# Patient Record
Sex: MALE | Race: OTHER | NOT HISPANIC OR LATINO | URBAN - METROPOLITAN AREA
[De-identification: names, ages, dates, MRNs, and addresses within clinical notes are randomized per-mention and may not be internally consistent; named-entity substitution may affect disease eponyms.]

---

## 2017-11-15 ENCOUNTER — EMERGENCY (EMERGENCY)
Facility: HOSPITAL | Age: 72
LOS: 1 days | Discharge: HOME | End: 2017-11-15
Attending: EMERGENCY MEDICINE | Admitting: INTERNAL MEDICINE
Payer: COMMERCIAL

## 2017-11-15 VITALS
SYSTOLIC BLOOD PRESSURE: 131 MMHG | OXYGEN SATURATION: 96 % | HEART RATE: 65 BPM | DIASTOLIC BLOOD PRESSURE: 68 MMHG | RESPIRATION RATE: 18 BRPM | TEMPERATURE: 98 F

## 2017-11-15 VITALS
TEMPERATURE: 99 F | RESPIRATION RATE: 18 BRPM | HEART RATE: 72 BPM | SYSTOLIC BLOOD PRESSURE: 168 MMHG | WEIGHT: 146.61 LBS | OXYGEN SATURATION: 98 % | DIASTOLIC BLOOD PRESSURE: 94 MMHG

## 2017-11-15 VITALS
SYSTOLIC BLOOD PRESSURE: 104 MMHG | RESPIRATION RATE: 18 BRPM | TEMPERATURE: 97 F | OXYGEN SATURATION: 99 % | WEIGHT: 149.91 LBS | HEART RATE: 67 BPM | HEIGHT: 61.81 IN | DIASTOLIC BLOOD PRESSURE: 66 MMHG

## 2017-11-15 DIAGNOSIS — E78.00 PURE HYPERCHOLESTEROLEMIA, UNSPECIFIED: ICD-10-CM

## 2017-11-15 DIAGNOSIS — R07.9 CHEST PAIN, UNSPECIFIED: ICD-10-CM

## 2017-11-15 DIAGNOSIS — I10 ESSENTIAL (PRIMARY) HYPERTENSION: ICD-10-CM

## 2017-11-15 DIAGNOSIS — R06.02 SHORTNESS OF BREATH: ICD-10-CM

## 2017-11-15 DIAGNOSIS — E11.65 TYPE 2 DIABETES MELLITUS WITH HYPERGLYCEMIA: ICD-10-CM

## 2017-11-15 LAB
ALBUMIN SERPL ELPH-MCNC: 4 G/DL — SIGNIFICANT CHANGE UP (ref 3.3–5)
ALBUMIN SERPL ELPH-MCNC: 4 G/DL — SIGNIFICANT CHANGE UP (ref 3.3–5)
ALP SERPL-CCNC: 65 U/L — SIGNIFICANT CHANGE UP (ref 40–120)
ALP SERPL-CCNC: 77 U/L — SIGNIFICANT CHANGE UP (ref 40–120)
ALT FLD-CCNC: 16 U/L — SIGNIFICANT CHANGE UP (ref 10–45)
ALT FLD-CCNC: 16 U/L — SIGNIFICANT CHANGE UP (ref 10–45)
ANION GAP SERPL CALC-SCNC: 12 MMOL/L — SIGNIFICANT CHANGE UP (ref 5–17)
ANION GAP SERPL CALC-SCNC: 13 MMOL/L — SIGNIFICANT CHANGE UP (ref 5–17)
APTT BLD: 24.9 SEC — LOW (ref 27.5–37.4)
AST SERPL-CCNC: 11 U/L — SIGNIFICANT CHANGE UP (ref 10–40)
AST SERPL-CCNC: 11 U/L — SIGNIFICANT CHANGE UP (ref 10–40)
BASOPHILS NFR BLD AUTO: 0.2 % — SIGNIFICANT CHANGE UP (ref 0–2)
BASOPHILS NFR BLD AUTO: 0.3 % — SIGNIFICANT CHANGE UP (ref 0–2)
BILIRUB SERPL-MCNC: 0.4 MG/DL — SIGNIFICANT CHANGE UP (ref 0.2–1.2)
BILIRUB SERPL-MCNC: 0.5 MG/DL — SIGNIFICANT CHANGE UP (ref 0.2–1.2)
BUN SERPL-MCNC: 20 MG/DL — SIGNIFICANT CHANGE UP (ref 7–23)
BUN SERPL-MCNC: 22 MG/DL — SIGNIFICANT CHANGE UP (ref 7–23)
CALCIUM SERPL-MCNC: 9.4 MG/DL — SIGNIFICANT CHANGE UP (ref 8.4–10.5)
CALCIUM SERPL-MCNC: 9.7 MG/DL — SIGNIFICANT CHANGE UP (ref 8.4–10.5)
CHLORIDE SERPL-SCNC: 96 MMOL/L — SIGNIFICANT CHANGE UP (ref 96–108)
CHLORIDE SERPL-SCNC: 99 MMOL/L — SIGNIFICANT CHANGE UP (ref 96–108)
CK MB CFR SERPL CALC: 3.3 NG/ML — SIGNIFICANT CHANGE UP (ref 0–6.7)
CK SERPL-CCNC: 86 U/L — SIGNIFICANT CHANGE UP (ref 30–200)
CO2 SERPL-SCNC: 25 MMOL/L — SIGNIFICANT CHANGE UP (ref 22–31)
CO2 SERPL-SCNC: 26 MMOL/L — SIGNIFICANT CHANGE UP (ref 22–31)
CREAT SERPL-MCNC: 0.76 MG/DL — SIGNIFICANT CHANGE UP (ref 0.5–1.3)
CREAT SERPL-MCNC: 0.85 MG/DL — SIGNIFICANT CHANGE UP (ref 0.5–1.3)
EOSINOPHIL NFR BLD AUTO: 1.3 % — SIGNIFICANT CHANGE UP (ref 0–6)
EOSINOPHIL NFR BLD AUTO: 1.9 % — SIGNIFICANT CHANGE UP (ref 0–6)
GLUCOSE SERPL-MCNC: 318 MG/DL — HIGH (ref 70–99)
GLUCOSE SERPL-MCNC: 372 MG/DL — HIGH (ref 70–99)
HCT VFR BLD CALC: 41.6 % — SIGNIFICANT CHANGE UP (ref 39–50)
HCT VFR BLD CALC: 41.9 % — SIGNIFICANT CHANGE UP (ref 39–50)
HGB BLD-MCNC: 14.3 G/DL — SIGNIFICANT CHANGE UP (ref 13–17)
HGB BLD-MCNC: 14.4 G/DL — SIGNIFICANT CHANGE UP (ref 13–17)
INR BLD: 0.99 — SIGNIFICANT CHANGE UP (ref 0.88–1.16)
LYMPHOCYTES # BLD AUTO: 14.5 % — SIGNIFICANT CHANGE UP (ref 13–44)
LYMPHOCYTES # BLD AUTO: 22 % — SIGNIFICANT CHANGE UP (ref 13–44)
MAGNESIUM SERPL-MCNC: 2.2 MG/DL — SIGNIFICANT CHANGE UP (ref 1.6–2.6)
MCHC RBC-ENTMCNC: 29.5 PG — SIGNIFICANT CHANGE UP (ref 27–34)
MCHC RBC-ENTMCNC: 29.8 PG — SIGNIFICANT CHANGE UP (ref 27–34)
MCHC RBC-ENTMCNC: 34.4 G/DL — SIGNIFICANT CHANGE UP (ref 32–36)
MCHC RBC-ENTMCNC: 34.4 G/DL — SIGNIFICANT CHANGE UP (ref 32–36)
MCV RBC AUTO: 86 FL — SIGNIFICANT CHANGE UP (ref 80–100)
MCV RBC AUTO: 86.6 FL — SIGNIFICANT CHANGE UP (ref 80–100)
MONOCYTES NFR BLD AUTO: 6.7 % — SIGNIFICANT CHANGE UP (ref 2–14)
MONOCYTES NFR BLD AUTO: 6.8 % — SIGNIFICANT CHANGE UP (ref 2–14)
NEUTROPHILS NFR BLD AUTO: 69 % — SIGNIFICANT CHANGE UP (ref 43–77)
NEUTROPHILS NFR BLD AUTO: 77.3 % — HIGH (ref 43–77)
PLATELET # BLD AUTO: 108 K/UL — LOW (ref 150–400)
PLATELET # BLD AUTO: 98 K/UL — LOW (ref 150–400)
POTASSIUM SERPL-MCNC: 4.1 MMOL/L — SIGNIFICANT CHANGE UP (ref 3.5–5.3)
POTASSIUM SERPL-MCNC: 4.2 MMOL/L — SIGNIFICANT CHANGE UP (ref 3.5–5.3)
POTASSIUM SERPL-SCNC: 4.1 MMOL/L — SIGNIFICANT CHANGE UP (ref 3.5–5.3)
POTASSIUM SERPL-SCNC: 4.2 MMOL/L — SIGNIFICANT CHANGE UP (ref 3.5–5.3)
PROT SERPL-MCNC: 7.1 G/DL — SIGNIFICANT CHANGE UP (ref 6–8.3)
PROT SERPL-MCNC: 7.1 G/DL — SIGNIFICANT CHANGE UP (ref 6–8.3)
PROTHROM AB SERPL-ACNC: 11 SEC — SIGNIFICANT CHANGE UP (ref 9.8–12.7)
RBC # BLD: 4.84 M/UL — SIGNIFICANT CHANGE UP (ref 4.2–5.8)
RBC # BLD: 4.84 M/UL — SIGNIFICANT CHANGE UP (ref 4.2–5.8)
RBC # FLD: 12.1 % — SIGNIFICANT CHANGE UP (ref 10.3–16.9)
RBC # FLD: 12.3 % — SIGNIFICANT CHANGE UP (ref 10.3–16.9)
SODIUM SERPL-SCNC: 134 MMOL/L — LOW (ref 135–145)
SODIUM SERPL-SCNC: 137 MMOL/L — SIGNIFICANT CHANGE UP (ref 135–145)
TROPONIN T SERPL-MCNC: <0.01 NG/ML — SIGNIFICANT CHANGE UP (ref 0–0.01)
WBC # BLD: 5.2 K/UL — SIGNIFICANT CHANGE UP (ref 3.8–10.5)
WBC # BLD: 6.9 K/UL — SIGNIFICANT CHANGE UP (ref 3.8–10.5)
WBC # FLD AUTO: 5.2 K/UL — SIGNIFICANT CHANGE UP (ref 3.8–10.5)
WBC # FLD AUTO: 6.9 K/UL — SIGNIFICANT CHANGE UP (ref 3.8–10.5)

## 2017-11-15 PROCEDURE — 70450 CT HEAD/BRAIN W/O DYE: CPT | Mod: 26

## 2017-11-15 PROCEDURE — 93010 ELECTROCARDIOGRAM REPORT: CPT

## 2017-11-15 PROCEDURE — 93306 TTE W/DOPPLER COMPLETE: CPT | Mod: 26

## 2017-11-15 PROCEDURE — 71010: CPT | Mod: 26

## 2017-11-15 PROCEDURE — 99285 EMERGENCY DEPT VISIT HI MDM: CPT | Mod: 25

## 2017-11-15 RX ORDER — DIPHENHYDRAMINE HCL 50 MG
25 CAPSULE ORAL ONCE
Qty: 0 | Refills: 0 | Status: COMPLETED | OUTPATIENT
Start: 2017-11-15 | End: 2017-11-15

## 2017-11-15 RX ORDER — SODIUM CHLORIDE 9 MG/ML
1000 INJECTION INTRAMUSCULAR; INTRAVENOUS; SUBCUTANEOUS
Qty: 0 | Refills: 0 | Status: DISCONTINUED | OUTPATIENT
Start: 2017-11-15 | End: 2017-11-19

## 2017-11-15 RX ORDER — ACETAMINOPHEN 500 MG
650 TABLET ORAL ONCE
Qty: 0 | Refills: 0 | Status: COMPLETED | OUTPATIENT
Start: 2017-11-15 | End: 2017-11-15

## 2017-11-15 RX ORDER — METOCLOPRAMIDE HCL 10 MG
10 TABLET ORAL ONCE
Qty: 0 | Refills: 0 | Status: COMPLETED | OUTPATIENT
Start: 2017-11-15 | End: 2017-11-15

## 2017-11-15 RX ADMIN — SODIUM CHLORIDE 125 MILLILITER(S): 9 INJECTION INTRAMUSCULAR; INTRAVENOUS; SUBCUTANEOUS at 10:33

## 2017-11-15 NOTE — DISCHARGE NOTE ADULT - MEDICATION SUMMARY - MEDICATIONS TO TAKE
I will START or STAY ON the medications listed below when I get home from the hospital:    losartan 100 mg oral tablet  -- 1 tab(s) by mouth once a day  -- Indication: For BLOOD PRESSURE    doxazosin 4 mg oral tablet  -- 1 tab(s) by mouth once a day  -- Indication: For BLOOD PRESSURE    metFORMIN 1000 mg oral tablet, extended release  -- 1 tab(s) by mouth 2 times a day  -- Indication: For Diabetes    NovoLIN 70/30 subcutaneous suspension  -- 50 unit(s) subcutaneous once a day (morning)  -- Indication: For Diabetes    NovoLOG Mix 70/30 subcutaneous suspension  -- 35 unit(s) subcutaneous once a day (at bedtime)  -- Indication: For Diabetes    docusate  -- orally 2 times a day  -- Indication: For STOOL SOFTNER

## 2017-11-15 NOTE — DISCHARGE NOTE ADULT - PROVIDER TOKENS
FREE:[LAST:[Donavon],FIRST:[Antonio],PHONE:[phone]],FAX:[(   )    -],ADDRESS:[Address: [address] [place] [address]  Phone: [phone]]]

## 2017-11-15 NOTE — ED ADULT NURSE NOTE - OBJECTIVE STATEMENT
Patient comes in via wheelchair into bed #2 for near syncopal episodes. Patient reports, "[place] subway [place] and I got sweaty, tired and it was hard to breath." Associated symptoms of a dull 0/10 generalized headache (hx of migraines) and right arm and right leg are numb which patient states has been for 2 years. No prior tx.

## 2017-11-15 NOTE — DISCHARGE NOTE ADULT - CARE PROVIDER_API CALL
Antonio Raphael  Address: [address] [place] [address]  Phone: [phone]  Phone: [phone]  Fax: (       -

## 2017-11-15 NOTE — DISCHARGE NOTE ADULT - HOSPITAL COURSE
Mandarin  # [id number]  73 yo Mandarin Speaking male with hx of HTN, DMII presented to [facility] ED (115/2017) with the complaint of near syncope associated with diaphoresis, SOB, and [place]. Pt felt like he was going to pass out and got off the subway and came to the ED. He reports he felt well upon awakening this morning, did not eat breakfast and took his AM blood pressure pills and Insulin.  Pt denies any chest pain, abdominal pain, palpitations, fever, chills, N/V, weakness. Pt follows up with his PCP Dr. Alvarez every couple months for diabetes and blood pressure management. He denies prior cardiology work up. Pt reports his last hospitalization for lung biopsy which was benign one year prior. Upon arrival to ED, BP: 104/66 mmHG, HR: 60s, RR: 18, Afebrile, O2: 99% RA, orthostatics negative.  12 Lead EKG Normal Sinus Rhythm with no acute changes. On exam, pt neurologically intact with no deficits. Pertinent lab values include: Troponin negative x1, BNP WNL, Serum Glucose level of 318, Negative D Dimer and Platelet count of 98K. Frontal CXR unremarkable. CT Head w/o revealed No evidence of acute intracranial hemorrhage and no apparent acute abnormality. Chronic microangiopathic disease and age-appropriate volume loss. 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 Echo revealed mild concentric LVH, LVEF 65-70%, mildly elevated left atrial pressure, calcified Aortic Valve, mild MAC. Pt was recommended for admission to [facility] but has signed out AMA. All risks of signing out AMA discussed with patient and Dr. Mina.

## 2017-11-15 NOTE — H&P ADULT - ASSESSMENT
Mandarin  # [id number]  71 yo Mandarin Speaking male with hx of HTN, DMII presented to [facility] ED (115/2017) with the complaint of near syncope associated with diaphoresis, SOB, and dizziness [place]. Pt felt like he was going to pass out and got off the subway and came to the ED. He reports he felt well upon awakening this morning, did not eat breakfast and took his AM blood pressure pills and Insulin.  Pt denies any chest pain, abdominal pain, palpitations, fever, chills, N/V, weakness. Pt follows up with his PCP Dr. Alvarez every couple months for diabetes and blood pressure management. He denies prior cardiology work up. Pt reports his last hospitalization for lung biopsy which was benign one year prior. Upon arrival to ED, BP: 104/66 mmHG, HR: 60s, RR: 18, Afebrile, O2: 99% RA, orthostatics negative.  12 Lead EKG Normal Sinus Rhythm with no acute changes. On exam, pt neurologically intact with no deficits. Pertinent lab values include: Troponin negative x1, BNP WNL, Serum Glucose level of 318, Negative D Dimer and Platelet count of 98K. Frontal CXR unremarkable. CT Head w/o revealed No evidence of acute intracranial hemorrhage and no apparent acute abnormality. Chronic microangiopathic disease and age-appropriate volume loss. 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 Echo revealed mild concentric LVH, LVEF 65-70%, mildly elevated left atrial pressure, calcified Aortic Valve, mild MAC. Pt was recommended for admission to [facility] but has signed out AMA. All risks of signing out AMA discussed with patient and Dr. Mina. 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 Mandarin  # [id number]  73 yo Mandarin Speaking male with hx of HTN, DMII presented to [facility] ED (115/2017) with the complaint of near syncope associated with diaphoresis, SOB, and dizziness [place] subway [place]. Pt felt like he was going to pass out and got off the subway and came to the ED. He reports he felt well upon awakening this morning, did not eat breakfast and took his AM blood pressure pills and Insulin.  Pt denies any chest pain, abdominal pain, palpitations, fever, chills, N/V, weakness. Pt follows up with his PCP Dr. Alvarez every couple months for diabetes and blood pressure management. He denies prior cardiology work up. Pt reports his last hospitalization for lung biopsy which was benign one year prior. Upon arrival to ED, BP: 104/66 mmHG, HR: 60s, RR: 18, Afebrile, O2: 99% RA, orthostatics negative.  12 Lead EKG Normal Sinus Rhythm with no acute changes. On exam, pt neurologically intact with no deficits. Pertinent lab values include: Troponin negative x1, BNP WNL, Serum Glucose level of 318, Negative D Dimer and Platelet count of 98K. Frontal CXR unremarkable. CT Head w/o revealed No evidence of acute intracranial hemorrhage and no apparent acute abnormality. Chronic microangiopathic disease and age-appropriate volume loss. 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 Echo revealed mild concentric LVH, LVEF 65-70%, mildly elevated left atrial pressure, calcified Aortic Valve, mild MAC. Pt was recommended for admission to [facility] but has signed out AMA. All risks of signing out AMA discussed with patient and Dr. Mina. 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     Pt. left hospital AMA to give wife keys earlier and returned a few hours later.  No change history or physical.

## 2017-11-15 NOTE — DISCHARGE NOTE ADULT - PATIENT PORTAL LINK FT
“You can access the FollowHealth Patient Portal, offered by [facility], by registering with the following website: [email or web address]”

## 2017-11-15 NOTE — ED ADULT TRIAGE NOTE - CHIEF COMPLAINT QUOTE
"I have bad headache.  I feel short of breath.  My arms feel numb and different temperature.  My clothes are wet with sweat."  PACIFIC  USED FOR MANDARIN SPEAKING.

## 2017-11-15 NOTE — ED PROVIDER NOTE - CARE PLAN
Principal Discharge DX:	Near syncope  Secondary Diagnosis:	Dyspnea  Secondary Diagnosis:	Hyperglycemia

## 2017-11-15 NOTE — ED PROVIDER NOTE - MEDICAL DECISION MAKING DETAILS
73 yo male with near syncopal episode 1 hr ago [place] - assoc with sob - no cp- glucose 318-  plts 98 K ? new vs old  left NL fold flattening but no other overt findings- will CT head  NIH SS 0-1

## 2017-11-15 NOTE — ED ADULT NURSE NOTE - CHPI ED SYMPTOMS NEG
no loss of consciousness/no blurred vision/no confusion/no change in level of consciousness/no vomiting/denies productive cough, fever/chills, CP, urinary symptoms, hitting head, slurring words./no weakness/no nausea/no fever

## 2017-11-15 NOTE — ED ADULT TRIAGE NOTE - OTHER COMPLAINTS
pt reports sob while walking today, near syncope. admits to chest discomfort, dizziness and increased weakness. hx htn and DM.  services used, pts primary language is mandarin.

## 2017-11-15 NOTE — DISCHARGE NOTE ADULT - PLAN OF CARE
PLEASE RETURN TO THE EMERGENCY ROOM IF YOUR SYMPTOMS RECUR. PLEASE RETURN TO THE EMERGENCY ROOM IF YOUR SYMPTOMS RECUR. (DIZZINESS, CHEST PAIN, SHORTNESS OF BREATH, FEVER, CHILLS, NAUSEA, VOMITING)

## 2017-11-15 NOTE — DISCHARGE NOTE ADULT - CARE PLAN
Principal Discharge DX:	Near syncope  Goal:	PLEASE RETURN TO THE EMERGENCY ROOM IF YOUR SYMPTOMS RECUR.  Instructions for follow-up, activity and diet:	PLEASE RETURN TO THE EMERGENCY ROOM IF YOUR SYMPTOMS RECUR. (DIZZINESS, CHEST PAIN, SHORTNESS OF BREATH, FEVER, CHILLS, NAUSEA, VOMITING)

## 2017-11-15 NOTE — H&P ADULT - HISTORY OF PRESENT ILLNESS
73 yo Mandarin Speaking male with hx of HTN, DMII, neuropathy, hypercholesteromia presented to [facility] ED (1115/2017) with the complaint of near syncope associated with SOB on subway. Pt denies any chest pain, abdominal pain, palpitations, fever, chills, N/V, diaphoresis. Upon arrival to ED, BP: 104/66 mmHG, HR: 60s, RR: 18, Afebrile, O2: 99% RA, orthostatics negative.  12 Lead EKG Normal Sinus Rhythm with no acute changes. On exam, pt neurologically intact with no deficits. Pertinent lab values include: Troponin negative x1, BNP WNL, Serum Glucose level of 318, Negative D Dimer and Platelet count of 98K. Frontal CXR unremarkable. CT Head w/o revealed No evidence of acute intracranial hemorrhage and no apparent acute abnormality. Chronic microangiopathic disease and age-appropriate volume loss. 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 Echo revealed mild concentric LVH, LVEF 65-70%, mildly elevated left atrial pressure, calcified Aortic Valve, mild MAC. Mandarin  # [id number]  71 yo Mandarin Speaking male with hx of HTN, DMII presented to [facility] ED (115/2017) with the complaint of near syncope associated with diaphoresis, SOB, and dizziness [place] the subway [place]. Pt felt like he was going to pass out and got off the subway and came to the ED. He reports he felt well upon awakening this morning, did not eat breakfast and took his AM blood pressure pills and Insulin.  Pt denies any chest pain, abdominal pain, palpitations, fever, chills, N/V, weakness. Pt follows up with his PCP Dr. Alvarez every couple months for diabetes and blood pressure management. He denies prior cardiology work up. Pt reports his last hospitalization for lung biopsy which was benign one year prior. Upon arrival to ED, BP: 104/66 mmHG, HR: 60s, RR: 18, Afebrile, O2: 99% RA, orthostatics negative.  12 Lead EKG Normal Sinus Rhythm with no acute changes. On exam, pt neurologically intact with no deficits. Pertinent lab values include: Troponin negative x1, BNP WNL, Serum Glucose level of 318, Negative D Dimer and Platelet count of 98K. Frontal CXR unremarkable. CT Head w/o revealed No evidence of acute intracranial hemorrhage and no apparent acute abnormality. Chronic microangiopathic disease and age-appropriate volume loss. 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 Echo revealed mild concentric LVH, LVEF 65-70%, mildly elevated left atrial pressure, calcified Aortic Valve, mild MAC. Pt was recommended for admission to [facility] but has signed out AMA. All risks of signing out AMA discussed with patient and Dr. Mina. Mandarin  # [id number]  71 yo Mandarin Speaking male with hx of HTN, DMII presented to [facility] ED (115/2017) with the complaint of near syncope associated with diaphoresis, SOB, and dizziness [place] the subway [place]. Pt felt like he was going to pass out and got off the subway and came to the ED. He reports he felt well upon awakening this morning, did not eat breakfast and took his AM blood pressure pills and Insulin.  Pt denies any chest pain, abdominal pain, palpitations, fever, chills, N/V, weakness. Pt follows up with his PCP Dr. Alvarez every couple months for diabetes and blood pressure management. He denies prior cardiology work up. Pt reports his last hospitalization for lung biopsy which was benign one year prior. Upon arrival to ED, BP: 104/66 mmHG, HR: 60s, RR: 18, Afebrile, O2: 99% RA, orthostatics negative.  12 Lead EKG Normal Sinus Rhythm with no acute changes. On exam, pt neurologically intact with no deficits. Pertinent lab values include: Troponin negative x1, BNP WNL, Serum Glucose level of 318, Negative D Dimer and Platelet count of 98K. Frontal CXR unremarkable. CT Head w/o revealed No evidence of acute intracranial hemorrhage and no apparent acute abnormality. Chronic microangiopathic disease and age-appropriate volume loss. 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 Echo revealed mild concentric LVH, LVEF 65-70%, mildly elevated left atrial pressure, calcified Aortic Valve, mild MAC. Pt was recommended for admission to [facility] but has signed out AMA. All risks of signing out AMA discussed with patient and Dr. Mina. 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

## 2017-11-15 NOTE — ED PROVIDER NOTE - OBJECTIVE STATEMENT
71 yo male with hx of HTN DM c/o of sob and feeling like he almost could pass out - no armani CP  pos sob  no abd pain or back pain  no headache or visual complaints  no focal weakness - cant recall if he has had a stress test-  non smoker  no hx of afib

## 2017-11-16 ENCOUNTER — TRANSCRIPTION ENCOUNTER (OUTPATIENT)
Age: 72
End: 2017-11-16

## 2017-11-16 ENCOUNTER — INPATIENT (INPATIENT)
Facility: HOSPITAL | Age: 72
LOS: 0 days | Discharge: LEFT BEFORE DISCHARGE | DRG: 312 | End: 2017-11-16
Attending: INTERNAL MEDICINE | Admitting: INTERNAL MEDICINE
Payer: COMMERCIAL

## 2017-11-16 VITALS
SYSTOLIC BLOOD PRESSURE: 135 MMHG | OXYGEN SATURATION: 95 % | TEMPERATURE: 98 F | DIASTOLIC BLOOD PRESSURE: 76 MMHG | HEART RATE: 67 BPM | RESPIRATION RATE: 17 BRPM

## 2017-11-16 DIAGNOSIS — R51 HEADACHE: ICD-10-CM

## 2017-11-16 DIAGNOSIS — E11.9 TYPE 2 DIABETES MELLITUS WITHOUT COMPLICATIONS: ICD-10-CM

## 2017-11-16 DIAGNOSIS — E78.00 PURE HYPERCHOLESTEROLEMIA, UNSPECIFIED: ICD-10-CM

## 2017-11-16 DIAGNOSIS — I10 ESSENTIAL (PRIMARY) HYPERTENSION: ICD-10-CM

## 2017-11-16 DIAGNOSIS — R55 SYNCOPE AND COLLAPSE: ICD-10-CM

## 2017-11-16 LAB
ALBUMIN SERPL ELPH-MCNC: 3.7 G/DL — SIGNIFICANT CHANGE UP (ref 3.3–5)
ALP SERPL-CCNC: 62 U/L — SIGNIFICANT CHANGE UP (ref 40–120)
ALT FLD-CCNC: 15 U/L — SIGNIFICANT CHANGE UP (ref 10–45)
ANION GAP SERPL CALC-SCNC: 13 MMOL/L — SIGNIFICANT CHANGE UP (ref 5–17)
APPEARANCE UR: CLEAR — SIGNIFICANT CHANGE UP
APTT BLD: 26.6 SEC — LOW (ref 27.5–37.4)
APTT BLD: 27.1 SEC — LOW (ref 27.5–37.4)
AST SERPL-CCNC: 12 U/L — SIGNIFICANT CHANGE UP (ref 10–40)
BILIRUB SERPL-MCNC: 0.5 MG/DL — SIGNIFICANT CHANGE UP (ref 0.2–1.2)
BILIRUB UR-MCNC: NEGATIVE — SIGNIFICANT CHANGE UP
BUN SERPL-MCNC: 17 MG/DL — SIGNIFICANT CHANGE UP (ref 7–23)
CALCIUM SERPL-MCNC: 9 MG/DL — SIGNIFICANT CHANGE UP (ref 8.4–10.5)
CHLORIDE SERPL-SCNC: 99 MMOL/L — SIGNIFICANT CHANGE UP (ref 96–108)
CHOLEST SERPL-MCNC: 138 MG/DL — SIGNIFICANT CHANGE UP (ref 10–199)
CK MB CFR SERPL CALC: 2.6 NG/ML — SIGNIFICANT CHANGE UP (ref 0–6.7)
CK SERPL-CCNC: 77 U/L — SIGNIFICANT CHANGE UP (ref 30–200)
CO2 SERPL-SCNC: 25 MMOL/L — SIGNIFICANT CHANGE UP (ref 22–31)
COLOR SPEC: YELLOW — SIGNIFICANT CHANGE UP
CREAT SERPL-MCNC: 0.77 MG/DL — SIGNIFICANT CHANGE UP (ref 0.5–1.3)
DIFF PNL FLD: NEGATIVE — SIGNIFICANT CHANGE UP
GLUCOSE BLDC GLUCOMTR-MCNC: 171 MG/DL — HIGH (ref 70–99)
GLUCOSE BLDC GLUCOMTR-MCNC: 224 MG/DL — HIGH (ref 70–99)
GLUCOSE BLDC GLUCOMTR-MCNC: 404 MG/DL — HIGH (ref 70–99)
GLUCOSE SERPL-MCNC: 198 MG/DL — HIGH (ref 70–99)
GLUCOSE UR QL: >=1000
HBA1C BLD-MCNC: 10.9 % — HIGH (ref 4–5.6)
HCT VFR BLD CALC: 41.2 % — SIGNIFICANT CHANGE UP (ref 39–50)
HDLC SERPL-MCNC: 54 MG/DL — SIGNIFICANT CHANGE UP (ref 40–125)
HGB BLD-MCNC: 14 G/DL — SIGNIFICANT CHANGE UP (ref 13–17)
INR BLD: 0.93 — SIGNIFICANT CHANGE UP (ref 0.88–1.16)
INR BLD: 0.95 — SIGNIFICANT CHANGE UP (ref 0.88–1.16)
KETONES UR-MCNC: NEGATIVE — SIGNIFICANT CHANGE UP
LEUKOCYTE ESTERASE UR-ACNC: NEGATIVE — SIGNIFICANT CHANGE UP
LIPID PNL WITH DIRECT LDL SERPL: 61 MG/DL — SIGNIFICANT CHANGE UP
MAGNESIUM SERPL-MCNC: 2.2 MG/DL — SIGNIFICANT CHANGE UP (ref 1.6–2.6)
MCHC RBC-ENTMCNC: 29.4 PG — SIGNIFICANT CHANGE UP (ref 27–34)
MCHC RBC-ENTMCNC: 34 G/DL — SIGNIFICANT CHANGE UP (ref 32–36)
MCV RBC AUTO: 86.4 FL — SIGNIFICANT CHANGE UP (ref 80–100)
NITRITE UR-MCNC: NEGATIVE — SIGNIFICANT CHANGE UP
PH UR: 6.5 — SIGNIFICANT CHANGE UP (ref 5–8)
PLATELET # BLD AUTO: 104 K/UL — LOW (ref 150–400)
POTASSIUM SERPL-MCNC: 3.4 MMOL/L — LOW (ref 3.5–5.3)
POTASSIUM SERPL-SCNC: 3.4 MMOL/L — LOW (ref 3.5–5.3)
PROT SERPL-MCNC: 6.6 G/DL — SIGNIFICANT CHANGE UP (ref 6–8.3)
PROT UR-MCNC: NEGATIVE MG/DL — SIGNIFICANT CHANGE UP
PROTHROM AB SERPL-ACNC: 10.3 SEC — SIGNIFICANT CHANGE UP (ref 9.8–12.7)
PROTHROM AB SERPL-ACNC: 10.5 SEC — SIGNIFICANT CHANGE UP (ref 9.8–12.7)
RBC # BLD: 4.77 M/UL — SIGNIFICANT CHANGE UP (ref 4.2–5.8)
RBC # FLD: 12.1 % — SIGNIFICANT CHANGE UP (ref 10.3–16.9)
SODIUM SERPL-SCNC: 137 MMOL/L — SIGNIFICANT CHANGE UP (ref 135–145)
SP GR SPEC: 1.01 — SIGNIFICANT CHANGE UP (ref 1–1.03)
TOTAL CHOLESTEROL/HDL RATIO MEASUREMENT: 2.6 RATIO — LOW (ref 3.4–9.6)
TRIGL SERPL-MCNC: 116 MG/DL — SIGNIFICANT CHANGE UP (ref 10–149)
TROPONIN T SERPL-MCNC: <0.01 NG/ML — SIGNIFICANT CHANGE UP (ref 0–0.01)
UROBILINOGEN FLD QL: 0.2 E.U./DL — SIGNIFICANT CHANGE UP
WBC # BLD: 5.3 K/UL — SIGNIFICANT CHANGE UP (ref 3.8–10.5)
WBC # FLD AUTO: 5.3 K/UL — SIGNIFICANT CHANGE UP (ref 3.8–10.5)

## 2017-11-16 PROCEDURE — 93005 ELECTROCARDIOGRAM TRACING: CPT

## 2017-11-16 PROCEDURE — 85025 COMPLETE CBC W/AUTO DIFF WBC: CPT

## 2017-11-16 PROCEDURE — 93306 TTE W/DOPPLER COMPLETE: CPT

## 2017-11-16 PROCEDURE — 80053 COMPREHEN METABOLIC PANEL: CPT

## 2017-11-16 PROCEDURE — 71045 X-RAY EXAM CHEST 1 VIEW: CPT

## 2017-11-16 PROCEDURE — 82550 ASSAY OF CK (CPK): CPT

## 2017-11-16 PROCEDURE — 82962 GLUCOSE BLOOD TEST: CPT

## 2017-11-16 PROCEDURE — 85379 FIBRIN DEGRADATION QUANT: CPT

## 2017-11-16 PROCEDURE — 99285 EMERGENCY DEPT VISIT HI MDM: CPT | Mod: 25

## 2017-11-16 PROCEDURE — 85730 THROMBOPLASTIN TIME PARTIAL: CPT

## 2017-11-16 PROCEDURE — 85610 PROTHROMBIN TIME: CPT

## 2017-11-16 PROCEDURE — 82553 CREATINE MB FRACTION: CPT

## 2017-11-16 PROCEDURE — 70450 CT HEAD/BRAIN W/O DYE: CPT

## 2017-11-16 PROCEDURE — 99223 1ST HOSP IP/OBS HIGH 75: CPT

## 2017-11-16 PROCEDURE — 83880 ASSAY OF NATRIURETIC PEPTIDE: CPT

## 2017-11-16 PROCEDURE — 84484 ASSAY OF TROPONIN QUANT: CPT

## 2017-11-16 RX ORDER — INSULIN GLARGINE 100 [IU]/ML
34 INJECTION, SOLUTION SUBCUTANEOUS AT BEDTIME
Qty: 0 | Refills: 0 | Status: DISCONTINUED | OUTPATIENT
Start: 2017-11-16 | End: 2017-11-16

## 2017-11-16 RX ORDER — GLUCAGON INJECTION, SOLUTION 0.5 MG/.1ML
1 INJECTION, SOLUTION SUBCUTANEOUS ONCE
Qty: 0 | Refills: 0 | Status: DISCONTINUED | OUTPATIENT
Start: 2017-11-16 | End: 2017-11-16

## 2017-11-16 RX ORDER — DEXTROSE 50 % IN WATER 50 %
25 SYRINGE (ML) INTRAVENOUS ONCE
Qty: 0 | Refills: 0 | Status: DISCONTINUED | OUTPATIENT
Start: 2017-11-16 | End: 2017-11-16

## 2017-11-16 RX ORDER — INSULIN LISPRO 100/ML
17 VIAL (ML) SUBCUTANEOUS
Qty: 0 | Refills: 0 | Status: DISCONTINUED | OUTPATIENT
Start: 2017-11-16 | End: 2017-11-16

## 2017-11-16 RX ORDER — DEXTROSE 50 % IN WATER 50 %
12.5 SYRINGE (ML) INTRAVENOUS ONCE
Qty: 0 | Refills: 0 | Status: DISCONTINUED | OUTPATIENT
Start: 2017-11-16 | End: 2017-11-16

## 2017-11-16 RX ORDER — INSULIN LISPRO 100/ML
VIAL (ML) SUBCUTANEOUS
Qty: 0 | Refills: 0 | Status: DISCONTINUED | OUTPATIENT
Start: 2017-11-16 | End: 2017-11-16

## 2017-11-16 RX ORDER — SODIUM CHLORIDE 9 MG/ML
1000 INJECTION, SOLUTION INTRAVENOUS
Qty: 0 | Refills: 0 | Status: DISCONTINUED | OUTPATIENT
Start: 2017-11-16 | End: 2017-11-16

## 2017-11-16 RX ORDER — POTASSIUM CHLORIDE 20 MEQ
40 PACKET (EA) ORAL EVERY 4 HOURS
Qty: 0 | Refills: 0 | Status: DISCONTINUED | OUTPATIENT
Start: 2017-11-16 | End: 2017-11-16

## 2017-11-16 RX ORDER — DOXAZOSIN MESYLATE 4 MG
4 TABLET ORAL AT BEDTIME
Qty: 0 | Refills: 0 | Status: DISCONTINUED | OUTPATIENT
Start: 2017-11-16 | End: 2017-11-16

## 2017-11-16 RX ORDER — DEXTROSE 50 % IN WATER 50 %
1 SYRINGE (ML) INTRAVENOUS ONCE
Qty: 0 | Refills: 0 | Status: DISCONTINUED | OUTPATIENT
Start: 2017-11-16 | End: 2017-11-16

## 2017-11-16 RX ORDER — HEPARIN SODIUM 5000 [USP'U]/ML
5000 INJECTION INTRAVENOUS; SUBCUTANEOUS EVERY 8 HOURS
Qty: 0 | Refills: 0 | Status: DISCONTINUED | OUTPATIENT
Start: 2017-11-16 | End: 2017-11-16

## 2017-11-16 RX ORDER — DOCUSATE SODIUM 100 MG
100 CAPSULE ORAL
Qty: 0 | Refills: 0 | Status: DISCONTINUED | OUTPATIENT
Start: 2017-11-16 | End: 2017-11-16

## 2017-11-16 RX ORDER — LOSARTAN POTASSIUM 100 MG/1
100 TABLET, FILM COATED ORAL DAILY
Qty: 0 | Refills: 0 | Status: DISCONTINUED | OUTPATIENT
Start: 2017-11-16 | End: 2017-11-16

## 2017-11-16 RX ORDER — INSULIN HUMAN 100 [IU]/ML
8 INJECTION, SOLUTION SUBCUTANEOUS ONCE
Qty: 0 | Refills: 0 | Status: COMPLETED | OUTPATIENT
Start: 2017-11-16 | End: 2017-11-16

## 2017-11-16 RX ORDER — POTASSIUM CHLORIDE 20 MEQ
20 PACKET (EA) ORAL ONCE
Qty: 0 | Refills: 0 | Status: COMPLETED | OUTPATIENT
Start: 2017-11-16 | End: 2017-11-16

## 2017-11-16 RX ADMIN — Medication 17 UNIT(S): at 09:46

## 2017-11-16 RX ADMIN — INSULIN HUMAN 8 UNIT(S): 100 INJECTION, SOLUTION SUBCUTANEOUS at 02:11

## 2017-11-16 RX ADMIN — Medication 17 UNIT(S): at 11:41

## 2017-11-16 RX ADMIN — Medication 20 MILLIEQUIVALENT(S): at 10:58

## 2017-11-16 RX ADMIN — LOSARTAN POTASSIUM 100 MILLIGRAM(S): 100 TABLET, FILM COATED ORAL at 06:20

## 2017-11-16 RX ADMIN — Medication 40 MILLIEQUIVALENT(S): at 09:42

## 2017-11-16 RX ADMIN — HEPARIN SODIUM 5000 UNIT(S): 5000 INJECTION INTRAVENOUS; SUBCUTANEOUS at 06:21

## 2017-11-16 RX ADMIN — Medication 12: at 11:40

## 2017-11-16 RX ADMIN — Medication 25 MILLIGRAM(S): at 00:14

## 2017-11-16 RX ADMIN — Medication 650 MILLIGRAM(S): at 00:14

## 2017-11-16 RX ADMIN — Medication 100 MILLIGRAM(S): at 06:20

## 2017-11-16 RX ADMIN — Medication 104 MILLIGRAM(S): at 00:14

## 2017-11-16 RX ADMIN — Medication: at 07:42

## 2017-11-16 NOTE — DISCHARGE NOTE ADULT - PATIENT PORTAL LINK FT
“You can access the FollowHealth Patient Portal, offered by [facility], by registering with the following website: [email or web address]”

## 2017-11-16 NOTE — ED PROVIDER NOTE - OBJECTIVE STATEMENT
72M hx htn, dm, high chol, returning after having left AMA earlier today. [place] the subway [place] suddenly felt dizzy, lightheaded.  +frontal headache.  no chest pain, no vomiting. +SOB worsening today.  pt was admitted to [facility] for monitoring. had negative CT head, Echo showing EF 65-70%.  pt states persistent HA and lightheaded upon walking.       [id number]

## 2017-11-16 NOTE — DISCHARGE NOTE ADULT - MEDICATION SUMMARY - MEDICATIONS TO TAKE
I will START or STAY ON the medications listed below when I get home from the hospital:    losartan 100 mg oral tablet  -- 1 tab(s) by mouth once a day  -- Indication: For HTN (hypertension)    doxazosin 4 mg oral tablet  -- 1 tab(s) by mouth once a day  -- Indication: For Enlarged Prostate     metFORMIN 1000 mg oral tablet, extended release  -- 1 tab(s) by mouth 2 times a day  -- Indication: For Diabetes    NovoLIN 70/30 subcutaneous suspension  -- 50 unit(s) subcutaneous once a day (morning)  -- Indication: For Diabetes    NovoLOG Mix 70/30 subcutaneous suspension  -- 35 unit(s) subcutaneous once a day (at bedtime)  -- Indication: For Diabetes    docusate  -- orally 2 times a day  -- Indication: For Stool Softener

## 2017-11-16 NOTE — DISCHARGE NOTE ADULT - CARE PLAN
Principal Discharge DX:	Near syncope  Goal:	Please follow-up with cardiologist Dr. Marie and Cardiac Electrophysiology team in 1-2 weeks.  Instructions for follow-up, activity and diet:	You presented to the hospital after almost having passed out while standing on the subway. You had blood work done which showed no evidence of heart damage. Your blood pressure remained controlled throughout your hospital stay. Your chest x-ray was normal. CT scan of your head showed no new blockages or bleeding. Echocardiogram performed showed that the pumping function of your heart is normal. Please follow-up with cardiologist Dr. Marie as well as our cardiac electrophysiology team in 1-2 weeks. Their information is provided below. Please also continue to follow-up with your primary care Dr. Alvarez in 1 week specifically for diabetes management.  Secondary Diagnosis:	HTN (hypertension)  Goal:	Maintain blood pressure < 140/90  Instructions for follow-up, activity and diet:	You have a history of elevated blood pressure and you should continue your blood pressure medications as prescribed. Please follow-up with cardiologist Dr. Marie and your primary care Dr. Alvarez.  Secondary Diagnosis:	Diabetes  Goal:	Maintain normal blood sugar levels (between 70 and 120 before meals).  Instructions for follow-up, activity and diet:	You have a diagnosis of diabetes and should continue all of your diabetic medications as prescribed. Please follow-up with Dr. Alvarez within 1 week for management of your diabetes and possible medication adjustments as your blood work showed your blood sugars have been very uncontrolled. Principal Discharge DX:	Near syncope  Goal:	Please follow-up with cardiologist Dr. Marie and Cardiac Electrophysiology team in 1-2 weeks.  Instructions for follow-up, activity and diet:	You presented to the hospital after almost having passed out [place] standing on the subway. You had blood work done which showed no evidence of heart damage. Your blood pressure remained controlled throughout your hospital stay. Your chest x-ray was normal. CT scan of your head showed no new blockages or bleeding. Echocardiogram performed showed that the pumping function of your heart is normal. Please follow-up with cardiologist Dr. Marie as well as our cardiac electrophysiology team in 1-2 weeks. Their information is provided below. Please also continue to follow-up with your primary care Dr. Ramos as scheduled. If you experience symptoms such as chest pain, shortness of breath, dizziness, or passing out, please seek immediate medical attention and call your doctor!!  Secondary Diagnosis:	HTN (hypertension)  Goal:	Maintain blood pressure < 140/90  Instructions for follow-up, activity and diet:	You have a history of elevated blood pressure and you should continue your blood pressure medications as prescribed. Please follow-up with cardiologist Dr. Marie in 1-2 weeks and your primary care Dr. Ramos as scheduled.  Secondary Diagnosis:	Diabetes  Goal:	Maintain normal blood sugar levels (between 70 and 120 before meals).  Instructions for follow-up, activity and diet:	You have a diagnosis of diabetes and should continue all of your diabetic medications as prescribed. Please follow-up with your endocrinologist at your scheduled appointment on November 28, 2017 @ 2PM within for management of your diabetes and possible medication adjustments as your blood work showed your blood sugars have been very uncontrolled. Very low and very high blood sugars can cause symptoms of passing out. Please go to your nearest emergency room for any symptoms of dizziness, lightheadedness, and passing out!

## 2017-11-16 NOTE — H&P ADULT - HISTORY OF PRESENT ILLNESS
Mandarin  # [id number]  71 yo Mandarin Speaking male with hx of HTN, DMII presented to [facility] ED (115/2017) with the complaint of near syncope associated with diaphoresis, SOB, and dizziness [place] subway [place]. Pt felt like he was going to pass out and got off the subway and came to the ED. He reports he felt well upon awakening this morning, did not eat breakfast and took his AM blood pressure pills and Insulin.  Pt denies any chest pain, abdominal pain, palpitations, fever, chills, N/V, weakness. Pt follows up with his PCP Dr. Alvarez every couple months for diabetes and blood pressure management. He denies prior cardiology work up. Pt reports his last hospitalization for lung biopsy which was benign one year prior. Upon arrival to ED, BP: 104/66 mmHG, HR: 60s, RR: 18, Afebrile, O2: 99% RA, orthostatics negative.  12 Lead EKG Normal Sinus Rhythm with no acute changes. On exam, pt neurologically intact with no deficits. Pertinent lab values include: Troponin negative x1, BNP WNL, Serum Glucose level of 318, Negative D Dimer and Platelet count of 98K. Frontal CXR unremarkable. CT Head w/o revealed No evidence of acute intracranial hemorrhage and no apparent acute abnormality. Chronic microangiopathic disease and age-appropriate volume loss. 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 Echo revealed mild concentric LVH, LVEF 65-70%, mildly elevated left atrial pressure, calcified Aortic Valve, mild MAC. Pt was recommended for admission to [facility] but has signed out AMA. All risks of signing out AMA discussed with patient and Dr. Mina. 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      Note: Pt. left AMA early this evening to return keys to wife and came back a few hour later.  No change in history or physical.

## 2017-11-16 NOTE — ED PROVIDER NOTE - MEDICAL DECISION MAKING DETAILS
near syncope, HA, no focal neuro deficits, states has had HAs similar in past, no sudden onset, doubt SAH. no chest pain, SOB  -check labs, ekg, reglan/benadryl

## 2017-11-16 NOTE — H&P ADULT - ASSESSMENT
Mandarin  # [id number]  71 yo Mandarin Speaking male with hx of HTN, DMII presented to [facility] ED (115/2017) with the complaint of near syncope associated with diaphoresis, SOB, and dizziness [place]. Pt felt like he was going to pass out and got off the subway and came to the ED. He reports he felt well upon awakening this morning, did not eat breakfast and took his AM blood pressure pills and Insulin. admission to [facility] but has signed out AMA. All risks of signing out AMA discussed with patient and Dr. Mina. 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      Note: Pt. left AMA early this evening to return keys to wife and came back a few hour later.  No change in history or physical.

## 2017-11-16 NOTE — DISCHARGE NOTE ADULT - PROVIDER TOKENS
TOKEN:'[id number]',FREE:[LAST:[[facility]],PHONE:[phone]],FAX:[(   )    -],ADDRESS:[[address] [place] [address], [address] [place] [address]]]

## 2017-11-16 NOTE — DISCHARGE NOTE ADULT - PLAN OF CARE
Please follow-up with cardiologist Dr. Marie and Cardiac Electrophysiology team in 1-2 weeks. You presented to the hospital after almost having [place] You had blood work done which showed no evidence of heart damage. Your blood pressure remained controlled throughout your hospital stay. Your chest x-ray was normal. CT scan of your head showed no new blockages or bleeding. Echocardiogram performed showed that the pumping function of your heart is normal. Please follow-up with cardiologist Dr. Marie as well as our cardiac electrophysiology team in 1-2 weeks. Their information is provided below. Please also continue to follow-up with your primary care Dr. Alvarez in 1 week specifically for diabetes management. Maintain blood pressure < 140/90 You have a history of elevated blood pressure and you should continue your blood pressure medications as prescribed. Please follow-up with cardiologist Dr. Marie and your primary care Dr. Alvarez. Maintain normal blood sugar levels (between 70 and 120 before meals). You have a diagnosis of diabetes and should continue all of your diabetic medications as prescribed. Please follow-up with Dr. Alvarez within 1 week for management of your diabetes and possible medication adjustments as your blood work showed your blood sugars have been very uncontrolled. You presented to the hospital after almost having [place] You had blood work done which showed no evidence of heart damage. Your blood pressure remained controlled throughout your hospital stay. Your chest x-ray was normal. CT scan of your head showed no new blockages or bleeding. Echocardiogram performed showed that the pumping function of your heart is normal. Please follow-up with cardiologist Dr. Marie as well as our cardiac electrophysiology team in 1-2 weeks. Their information is provided below. Please also continue to follow-up with your primary care Dr. Ramos as scheduled. If you experience symptoms such as chest pain, shortness of breath, dizziness, or passing out, please seek immediate medical attention and call your doctor!! You have a history of elevated blood pressure and you should continue your blood pressure medications as prescribed. Please follow-up with cardiologist Dr. Marie in 1-2 weeks and your primary care Dr. Ramos as scheduled. You have a diagnosis of diabetes and should continue all of your diabetic medications as prescribed. Please follow-up with your endocrinologist at your scheduled appointment on November 28, 2017 @ 2PM within for management of your diabetes and possible medication adjustments as your blood work showed your blood sugars have been very uncontrolled. Very low and very high blood sugars can cause symptoms of passing out. Please go to your nearest emergency room for any symptoms of dizziness, lightheadedness, and passing out!

## 2017-11-16 NOTE — DISCHARGE NOTE ADULT - CARE PROVIDER_API CALL
Dev Marie), Internal Medicine  [address] [id number]  Phone: [phone]  Fax: [phone]    [facility],   [address] Floor,   [place] [address]  Phone: [phone]  Fax: (   )    -

## 2017-11-16 NOTE — H&P ADULT - NSHPPHYSICALEXAM_GEN_ALL_CORE
T(C): 36.6 (11-15-17 @ 22:10), Max: 37 (11-15-17 @ 21:45)  HR: 70 (11-15-17 @ 22:10) (65 - 78)  BP: 152/68 (11-15-17 @ 22:10) (103/56 - 168/94)  RR: 17 (11-15-17 @ 22:10) (16 - 18)  SpO2: 98% (11-15-17 @ 22:10) (96% - 99%)  Wt(kg): --    Appearance: Normal	  HEENT:   Normal oral mucosa, PERRL, EOMI	  Neck: Supple, + JVD/ - JVD; No Carotid Bruit and 2+ pulses B/L  Cardiovascular: Normal S1 S2, No JVD, No murmurs  Respiratory: Lungs clear to auscultation/Decreased Breath Sounds/No Rales, Rhonchi, Wheezing	  Gastrointestinal:  Soft, Non-tender, + BS	  Skin: No rashes, No ecchymoses, No cyanosis  Extremities: Normal range of motion, No clubbing, cyanosis or edema  Vascular: Femoral pulses 2+ b/l without bruit, DP 1+ b/l, PT 1+ b/l  Neurologic: Non-focal  Psychiatry: A & O x 3, Mood & affect appropriate T(C): 36.6 (11-15-17 @ 22:10), Max: 37 (11-15-17 @ 21:45)  HR: 70 (11-15-17 @ 22:10) (65 - 78)  BP: 152/68 (11-15-17 @ 22:10) (103/56 - 168/94)  RR: 17 (11-15-17 @ 22:10) (16 - 18)  SpO2: 98% (11-15-17 @ 22:10) (96% - 99%)  Wt(kg): --    Appearance: Normal	  HEENT:   Normal oral mucosa, PERRL, EOMI	  Neck: Supple,  - JVD; No Carotid Bruit and 2+ pulses B/L  Cardiovascular: Normal S1 S2, No JVD, No murmurs  Respiratory: Lungs clear to auscultation//No Rales, Rhonchi, Wheezing	  Gastrointestinal:  Soft, Non-tender, + BS	  Skin: No rashes, No ecchymoses, No cyanosis  Extremities: Normal range of motion, No clubbing, cyanosis or edema  Vascular: Femoral pulses 2+ b/l without bruit, DP 1+ b/l, PT 1+ b/l  Neurologic: Non-focal  Psychiatry: A & O x 3, Mood & affect appropriate

## 2017-11-16 NOTE — ED PROVIDER NOTE - CARE PLAN
Principal Discharge DX:	Near syncope  Secondary Diagnosis:	Nonintractable episodic headache, unspecified headache type

## 2017-11-16 NOTE — DISCHARGE NOTE ADULT - HOSPITAL COURSE
73 yo Mandarin Speaking male with hx of HTN, DMII presented to [facility] ED (115/2017) with the complaint of near syncope associated with diaphoresis, SOB, and dizziness [place] subway [place]. Pt felt like he was going to pass out and got off the subway and came to the ED. He reports he felt well upon awakening this morning, did not eat breakfast and took his AM blood pressure pills and Insulin.  Pt denies any chest pain, abdominal pain, palpitations, fever, chills, N/V, weakness. Pt follows up with his PCP Dr. Alvarez every couple months for diabetes and blood pressure management. He denies prior cardiology work up. Pt reports his last hospitalization for lung biopsy which was benign one year prior. Upon arrival to ED, BP: 104/66 mmHG, HR: 60s, RR: 18, Afebrile, O2: 99% RA, orthostatics negative.  12 Lead EKG Normal Sinus Rhythm with no acute changes. On exam, pt neurologically intact with no deficits. Pertinent lab values include: Troponin negative x1, BNP WNL, Serum Glucose level of 318, Negative D Dimer and Platelet count of 98K. Frontal CXR unremarkable. CT Head w/o revealed No evidence of acute intracranial hemorrhage and no apparent acute abnormality. Chronic microangiopathic disease and age-appropriate volume loss. 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 Echo revealed mild concentric LVH, LVEF 65-70%, mildly elevated left atrial pressure, calcified Aortic Valve, mild MAC. Pt was recommended for admission to [facility] but has signed out AMA. All risks of signing out AMA discussed with patient and Dr. Mina. 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 71 y/o Mandarin Speaking male with hx of HTN, DMII presented to [facility] ED (115/2017) with the complaint of near syncope associated with diaphoresis, SOB, and dizziness [place] subway [place]. Pt felt like he was going to pass out and got off the subway and came to the ED. He reports he felt well upon awakening this morning, did not eat breakfast and took his AM blood pressure pills and Insulin.  Pt denies any chest pain, abdominal pain, palpitations, fever, chills, N/V, weakness. Pt follows up with his PCP Dr. Ramos every couple months for diabetes and blood pressure management. He denies prior cardiology work up. Pt reports his last hospitalization for lung biopsy which was benign one year prior. Upon arrival to ED, BP: 104/66 mmHG, HR: 60s, RR: 18, Afebrile, O2: 99% RA, orthostatics negative.  12 Lead EKG Normal Sinus Rhythm with no acute changes. On exam, pt neurologically intact with no deficits. Pertinent lab values include: Troponin negative x1, BNP WNL, Serum Glucose level of 318, Negative D Dimer and Platelet count of 98K. Frontal CXR unremarkable. CT Head w/o revealed No evidence of acute intracranial hemorrhage and no apparent acute abnormality. Chronic microangiopathic disease and age-appropriate volume loss. 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 Echo revealed mild concentric LVH, LVEF 65-70%, mildly elevated left atrial pressure, calcified Aortic Valve, mild MAC. Pt was recommended for admission to [facility] but signed out AMA. All risks of signing out AMA discussed with patient and Dr. Mina. 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 When the patient returned he ruled out with Troponin negative x 3. Pt seen and examined at bedside with use of Mandarin  #[id number]. Pt without complaints- denies chest pain, shortness of breath, n/v/d, LE edema, dizziness, lightheadedness, or syncope. Labs WNL except A1C 10.9, K 3.4 repleted. Blood sugars remained high throughout the night and morning and the patient was covered with insulin sliding scale. No acute events overnight or events on telemetry. VSS. Patient will f/u with Dr. Marie in 1-2 weeks. Patient also has follow-up appointment with Endocrinologist on 11/28/17 @ 2PM. Instructed that he must follow-up at this appointment for diabetes management and follow-up with his PMD Dr. Ramos. He also was given the information to set up an appointment with the EP clinic within 1-2 weeks. Instructed to return if symptoms worsen including but not limited to chest pain, shortness of breath, dizziness/lightheadedness/syncope. Pt seen and examined by attending Dr. Marie who agrees with above d/c plan.  V/S 	BP: 149/77		HR: 61	 	RR: 16			T: 97.9		O2: 97% RA   	  GEN: NAD  PULM:  CTA B/L  CARD: No JVD B/L, RRR, S1 and S2   ABD: +BS, NT, soft/ND	  EXT: No Edema B/L LE  NEURO: A+Ox3, no focal deficit

## 2017-11-16 NOTE — DISCHARGE NOTE ADULT - INSTRUCTIONS
Please continue to follow a heart healthy diet low in sodium, fat, and cholesterol. PLEASE FOLLOW A DIABETIC DIET AND RESTRICT SUGAR INTAKE!

## 2017-11-16 NOTE — H&P ADULT - NSHPLABSRESULTS_GEN_ALL_CORE
14.3   6.9   )-----------( 108      ( 15 Nov 2017 23:25 )             41.6       11-15    134<L>  |  96  |  20  ----------------------------<  372<H>  4.2   |  25  |  0.76    Ca    9.4      15 Nov 2017 23:25  Mg     2.2     11-15    TPro  7.1  /  Alb  4.0  /  TBili  0.4  /  DBili  x   /  AST  11  /  ALT  16  /  AlkPhos  77  11-15      PT/INR - ( 15 Nov 2017 23:25 )   PT: 10.5 sec;   INR: 0.95          PTT - ( 15 Nov 2017 23:25 )  PTT:26.6 sec    CARDIAC MARKERS ( 15 Nov 2017 23:25 )  x     / <0.01 ng/mL / 101 U/L / x     / 3.6 ng/mL  CARDIAC MARKERS ( 15 Nov 2017 10:22 )  x     / <0.01 ng/mL / 86 U/L / x     / 3.3 ng/mL        Urinalysis Basic - ( 15 Nov 2017 23:28 )    Color: Yellow / Appearance: Clear / S.010 / pH: x  Gluc: x / Ketone: NEGATIVE  / Bili: Negative / Urobili: 0.2 E.U./dL   Blood: x / Protein: NEGATIVE mg/dL / Nitrite: NEGATIVE   Leuk Esterase: NEGATIVE / RBC: x / WBC x   Sq Epi: x / Non Sq Epi: x / Bacteria: x        EKG:

## 2017-11-20 DIAGNOSIS — E13.21 OTHER SPECIFIED DIABETES MELLITUS WITH DIABETIC NEPHROPATHY: ICD-10-CM

## 2017-11-20 DIAGNOSIS — R55 SYNCOPE AND COLLAPSE: ICD-10-CM

## 2017-11-20 DIAGNOSIS — N40.0 BENIGN PROSTATIC HYPERPLASIA WITHOUT LOWER URINARY TRACT SYMPTOMS: ICD-10-CM

## 2017-11-20 DIAGNOSIS — E11.65 TYPE 2 DIABETES MELLITUS WITH HYPERGLYCEMIA: ICD-10-CM

## 2017-11-20 DIAGNOSIS — I10 ESSENTIAL (PRIMARY) HYPERTENSION: ICD-10-CM

## 2017-11-20 DIAGNOSIS — R51 HEADACHE: ICD-10-CM

## 2017-12-19 PROCEDURE — 85025 COMPLETE CBC W/AUTO DIFF WBC: CPT

## 2017-12-19 PROCEDURE — 82550 ASSAY OF CK (CPK): CPT

## 2017-12-19 PROCEDURE — 85610 PROTHROMBIN TIME: CPT

## 2017-12-19 PROCEDURE — 99285 EMERGENCY DEPT VISIT HI MDM: CPT | Mod: 25

## 2017-12-19 PROCEDURE — 85027 COMPLETE CBC AUTOMATED: CPT

## 2017-12-19 PROCEDURE — 96375 TX/PRO/DX INJ NEW DRUG ADDON: CPT

## 2017-12-19 PROCEDURE — 96374 THER/PROPH/DIAG INJ IV PUSH: CPT

## 2017-12-19 PROCEDURE — 93005 ELECTROCARDIOGRAM TRACING: CPT

## 2017-12-19 PROCEDURE — 83735 ASSAY OF MAGNESIUM: CPT

## 2017-12-19 PROCEDURE — 80061 LIPID PANEL: CPT

## 2017-12-19 PROCEDURE — 81003 URINALYSIS AUTO W/O SCOPE: CPT

## 2017-12-19 PROCEDURE — 36415 COLL VENOUS BLD VENIPUNCTURE: CPT

## 2017-12-19 PROCEDURE — 80053 COMPREHEN METABOLIC PANEL: CPT

## 2017-12-19 PROCEDURE — 85730 THROMBOPLASTIN TIME PARTIAL: CPT

## 2017-12-19 PROCEDURE — 84484 ASSAY OF TROPONIN QUANT: CPT

## 2017-12-19 PROCEDURE — 83036 HEMOGLOBIN GLYCOSYLATED A1C: CPT

## 2017-12-19 PROCEDURE — 82248 BILIRUBIN DIRECT: CPT

## 2017-12-19 PROCEDURE — 82962 GLUCOSE BLOOD TEST: CPT

## 2017-12-19 PROCEDURE — 82553 CREATINE MB FRACTION: CPT

## 2018-06-05 ENCOUNTER — EMERGENCY (EMERGENCY)
Facility: HOSPITAL | Age: 73
LOS: 1 days | Discharge: HOME | End: 2018-06-05
Attending: EMERGENCY MEDICINE | Admitting: EMERGENCY MEDICINE
Payer: COMMERCIAL

## 2018-06-05 VITALS
TEMPERATURE: 98 F | SYSTOLIC BLOOD PRESSURE: 157 MMHG | DIASTOLIC BLOOD PRESSURE: 78 MMHG | OXYGEN SATURATION: 98 % | WEIGHT: 149.91 LBS | HEART RATE: 90 BPM | RESPIRATION RATE: 18 BRPM

## 2018-06-05 DIAGNOSIS — R11.0 NAUSEA: ICD-10-CM

## 2018-06-05 DIAGNOSIS — R10.33 PERIUMBILICAL PAIN: ICD-10-CM

## 2018-06-05 DIAGNOSIS — R40.0 SOMNOLENCE: ICD-10-CM

## 2018-06-05 DIAGNOSIS — I10 ESSENTIAL (PRIMARY) HYPERTENSION: ICD-10-CM

## 2018-06-05 DIAGNOSIS — Z79.4 LONG TERM (CURRENT) USE OF INSULIN: ICD-10-CM

## 2018-06-05 DIAGNOSIS — E78.00 PURE HYPERCHOLESTEROLEMIA, UNSPECIFIED: ICD-10-CM

## 2018-06-05 DIAGNOSIS — Z79.899 OTHER LONG TERM (CURRENT) DRUG THERAPY: ICD-10-CM

## 2018-06-05 DIAGNOSIS — Z79.84 LONG TERM (CURRENT) USE OF ORAL HYPOGLYCEMIC DRUGS: ICD-10-CM

## 2018-06-05 DIAGNOSIS — E11.9 TYPE 2 DIABETES MELLITUS WITHOUT COMPLICATIONS: ICD-10-CM

## 2018-06-05 LAB
ALBUMIN SERPL ELPH-MCNC: 4.1 G/DL — SIGNIFICANT CHANGE UP (ref 3.3–5)
ALP SERPL-CCNC: 58 U/L — SIGNIFICANT CHANGE UP (ref 40–120)
ALT FLD-CCNC: 22 U/L — SIGNIFICANT CHANGE UP (ref 10–45)
ANION GAP SERPL CALC-SCNC: 13 MMOL/L — SIGNIFICANT CHANGE UP (ref 5–17)
AST SERPL-CCNC: 16 U/L — SIGNIFICANT CHANGE UP (ref 10–40)
B-OH-BUTYR SERPL-SCNC: 0.3 MMOL/L — SIGNIFICANT CHANGE UP
BASOPHILS NFR BLD AUTO: 0.2 % — SIGNIFICANT CHANGE UP (ref 0–2)
BILIRUB SERPL-MCNC: 0.6 MG/DL — SIGNIFICANT CHANGE UP (ref 0.2–1.2)
BUN SERPL-MCNC: 15 MG/DL — SIGNIFICANT CHANGE UP (ref 7–23)
CALCIUM SERPL-MCNC: 9.6 MG/DL — SIGNIFICANT CHANGE UP (ref 8.4–10.5)
CHLORIDE SERPL-SCNC: 100 MMOL/L — SIGNIFICANT CHANGE UP (ref 96–108)
CO2 SERPL-SCNC: 25 MMOL/L — SIGNIFICANT CHANGE UP (ref 22–31)
CREAT SERPL-MCNC: 0.71 MG/DL — SIGNIFICANT CHANGE UP (ref 0.5–1.3)
EOSINOPHIL NFR BLD AUTO: 0.9 % — SIGNIFICANT CHANGE UP (ref 0–6)
GAS PNL BLDV: SIGNIFICANT CHANGE UP
GLUCOSE SERPL-MCNC: 325 MG/DL — HIGH (ref 70–99)
HCT VFR BLD CALC: 42.4 % — SIGNIFICANT CHANGE UP (ref 39–50)
HGB BLD-MCNC: 14.5 G/DL — SIGNIFICANT CHANGE UP (ref 13–17)
LACTATE SERPL-SCNC: 0.9 MMOL/L — SIGNIFICANT CHANGE UP (ref 0.5–2)
LIDOCAIN IGE QN: 15 U/L — SIGNIFICANT CHANGE UP (ref 7–60)
LYMPHOCYTES # BLD AUTO: 15.9 % — SIGNIFICANT CHANGE UP (ref 13–44)
MCHC RBC-ENTMCNC: 29.5 PG — SIGNIFICANT CHANGE UP (ref 27–34)
MCHC RBC-ENTMCNC: 34.2 G/DL — SIGNIFICANT CHANGE UP (ref 32–36)
MCV RBC AUTO: 86.2 FL — SIGNIFICANT CHANGE UP (ref 80–100)
MONOCYTES NFR BLD AUTO: 5.8 % — SIGNIFICANT CHANGE UP (ref 2–14)
NEUTROPHILS NFR BLD AUTO: 77.2 % — HIGH (ref 43–77)
PLATELET # BLD AUTO: 105 K/UL — LOW (ref 150–400)
POTASSIUM SERPL-MCNC: 4.2 MMOL/L — SIGNIFICANT CHANGE UP (ref 3.5–5.3)
POTASSIUM SERPL-SCNC: 4.2 MMOL/L — SIGNIFICANT CHANGE UP (ref 3.5–5.3)
PROT SERPL-MCNC: 7.1 G/DL — SIGNIFICANT CHANGE UP (ref 6–8.3)
RBC # BLD: 4.92 M/UL — SIGNIFICANT CHANGE UP (ref 4.2–5.8)
RBC # FLD: 12.3 % — SIGNIFICANT CHANGE UP (ref 10.3–16.9)
SODIUM SERPL-SCNC: 138 MMOL/L — SIGNIFICANT CHANGE UP (ref 135–145)
TROPONIN T SERPL-MCNC: <0.01 NG/ML — SIGNIFICANT CHANGE UP (ref 0–0.01)
WBC # BLD: 5.7 K/UL — SIGNIFICANT CHANGE UP (ref 3.8–10.5)
WBC # FLD AUTO: 5.7 K/UL — SIGNIFICANT CHANGE UP (ref 3.8–10.5)

## 2018-06-05 PROCEDURE — 93010 ELECTROCARDIOGRAM REPORT: CPT

## 2018-06-05 PROCEDURE — 99284 EMERGENCY DEPT VISIT MOD MDM: CPT | Mod: 25

## 2018-06-05 RX ORDER — FAMOTIDINE 10 MG/ML
20 INJECTION INTRAVENOUS ONCE
Qty: 0 | Refills: 0 | Status: COMPLETED | OUTPATIENT
Start: 2018-06-05 | End: 2018-06-05

## 2018-06-05 RX ORDER — IOHEXOL 300 MG/ML
50 INJECTION, SOLUTION INTRAVENOUS ONCE
Qty: 0 | Refills: 0 | Status: COMPLETED | OUTPATIENT
Start: 2018-06-05 | End: 2018-06-06

## 2018-06-05 RX ORDER — IOHEXOL 300 MG/ML
50 INJECTION, SOLUTION INTRAVENOUS ONCE
Qty: 0 | Refills: 0 | Status: DISCONTINUED | OUTPATIENT
Start: 2018-06-05 | End: 2018-06-05

## 2018-06-05 RX ORDER — ONDANSETRON 8 MG/1
4 TABLET, FILM COATED ORAL ONCE
Qty: 0 | Refills: 0 | Status: COMPLETED | OUTPATIENT
Start: 2018-06-05 | End: 2018-06-05

## 2018-06-05 RX ORDER — SODIUM CHLORIDE 9 MG/ML
500 INJECTION INTRAMUSCULAR; INTRAVENOUS; SUBCUTANEOUS ONCE
Qty: 0 | Refills: 0 | Status: COMPLETED | OUTPATIENT
Start: 2018-06-05 | End: 2018-06-05

## 2018-06-05 NOTE — ED ADULT NURSE NOTE - OBJECTIVE STATEMENT
pt received into spot 17 A&Ox3 ambulatory appears comfortable complaining of mid/ upper pt received into spot 17 A&Ox3 appears comfortable arrives via walk in triage with complaints of abd pain N/V x 3 days speaks some english but  phone used for assessment ID #[id number] Pt mid abd pain N/V x 3 days denies fever chills sick contacts recent antibiotics use. Pt denies any CP SOB palpitations lightheadedness dizziness weakness. Denies drug or etoh use but appears to have somewhat unsteady gait upon attempt to ambulate to restroom. When asked if this has happened in past stated yes and when asked if he was evaluated he stated yes and it was his diabetes. 12 lead EKG done NSR noted 20G placed to RAC labs drawn and sent pt in NAD will monitor and reassess

## 2018-06-06 VITALS
TEMPERATURE: 99 F | OXYGEN SATURATION: 97 % | DIASTOLIC BLOOD PRESSURE: 80 MMHG | HEART RATE: 78 BPM | RESPIRATION RATE: 18 BRPM | SYSTOLIC BLOOD PRESSURE: 160 MMHG

## 2018-06-06 PROBLEM — I10 ESSENTIAL (PRIMARY) HYPERTENSION: Chronic | Status: ACTIVE | Noted: 2017-11-15

## 2018-06-06 PROBLEM — E11.21 TYPE 2 DIABETES MELLITUS WITH DIABETIC NEPHROPATHY: Chronic | Status: ACTIVE | Noted: 2017-11-15

## 2018-06-06 PROBLEM — E78.00 PURE HYPERCHOLESTEROLEMIA, UNSPECIFIED: Chronic | Status: ACTIVE | Noted: 2017-11-15

## 2018-06-06 PROBLEM — E11.9 TYPE 2 DIABETES MELLITUS WITHOUT COMPLICATIONS: Chronic | Status: ACTIVE | Noted: 2017-11-15

## 2018-06-06 LAB
APPEARANCE UR: ABNORMAL
BILIRUB UR-MCNC: NEGATIVE — SIGNIFICANT CHANGE UP
COLOR SPEC: YELLOW — SIGNIFICANT CHANGE UP
DIFF PNL FLD: NEGATIVE — SIGNIFICANT CHANGE UP
GLUCOSE UR QL: >=1000
KETONES UR-MCNC: 15 MG/DL
LEUKOCYTE ESTERASE UR-ACNC: NEGATIVE — SIGNIFICANT CHANGE UP
NITRITE UR-MCNC: NEGATIVE — SIGNIFICANT CHANGE UP
PH UR: 7.5 — SIGNIFICANT CHANGE UP (ref 5–8)
PROT UR-MCNC: NEGATIVE MG/DL — SIGNIFICANT CHANGE UP
SP GR SPEC: 1.01 — SIGNIFICANT CHANGE UP (ref 1–1.03)
UROBILINOGEN FLD QL: 0.2 E.U./DL — SIGNIFICANT CHANGE UP

## 2018-06-06 PROCEDURE — 84484 ASSAY OF TROPONIN QUANT: CPT

## 2018-06-06 PROCEDURE — 96374 THER/PROPH/DIAG INJ IV PUSH: CPT | Mod: XU

## 2018-06-06 PROCEDURE — 96375 TX/PRO/DX INJ NEW DRUG ADDON: CPT

## 2018-06-06 PROCEDURE — 82962 GLUCOSE BLOOD TEST: CPT

## 2018-06-06 PROCEDURE — 82010 KETONE BODYS QUAN: CPT

## 2018-06-06 PROCEDURE — 36415 COLL VENOUS BLD VENIPUNCTURE: CPT

## 2018-06-06 PROCEDURE — 93005 ELECTROCARDIOGRAM TRACING: CPT

## 2018-06-06 PROCEDURE — 74177 CT ABD & PELVIS W/CONTRAST: CPT | Mod: 26

## 2018-06-06 PROCEDURE — 74177 CT ABD & PELVIS W/CONTRAST: CPT

## 2018-06-06 PROCEDURE — 99284 EMERGENCY DEPT VISIT MOD MDM: CPT | Mod: 25

## 2018-06-06 PROCEDURE — 85025 COMPLETE CBC W/AUTO DIFF WBC: CPT

## 2018-06-06 PROCEDURE — 84132 ASSAY OF SERUM POTASSIUM: CPT

## 2018-06-06 PROCEDURE — 84295 ASSAY OF SERUM SODIUM: CPT

## 2018-06-06 PROCEDURE — 82803 BLOOD GASES ANY COMBINATION: CPT

## 2018-06-06 PROCEDURE — 82330 ASSAY OF CALCIUM: CPT

## 2018-06-06 PROCEDURE — 83690 ASSAY OF LIPASE: CPT

## 2018-06-06 PROCEDURE — 80053 COMPREHEN METABOLIC PANEL: CPT

## 2018-06-06 PROCEDURE — 83605 ASSAY OF LACTIC ACID: CPT

## 2018-06-06 RX ADMIN — ONDANSETRON 4 MILLIGRAM(S): 8 TABLET, FILM COATED ORAL at 00:03

## 2018-06-06 RX ADMIN — FAMOTIDINE 20 MILLIGRAM(S): 10 INJECTION INTRAVENOUS at 00:03

## 2018-06-06 RX ADMIN — IOHEXOL 50 MILLILITER(S): 300 INJECTION, SOLUTION INTRAVENOUS at 00:03

## 2018-06-06 RX ADMIN — SODIUM CHLORIDE 500 MILLILITER(S): 9 INJECTION INTRAMUSCULAR; INTRAVENOUS; SUBCUTANEOUS at 00:03

## 2018-06-06 NOTE — ED PROVIDER NOTE - MEDICAL DECISION MAKING DETAILS
73M with abdominal pain, speaks Mandarin, periumbilical 3 days associated with nausea, afebrile, pt is sleepy here but able to cooperate with Mandarin . Labs unremarkable, UA shows no e/o infection. CT scan shows no e/o appendicitis, plan for f/u with GI, communicated results via  and son understands importance of GI follow up. Son to  patient at 6:30 AM.

## 2018-06-06 NOTE — ED PROVIDER NOTE - PHYSICAL EXAMINATION
Gen: well appearing sleeping comfortably, arousable to voice   HEENT: oropharynx clear  CV: rrr no murmur  Resp: ctab  Abd: periumbilical tenderness to palpation, no rebound/guarding  Ext: no edema to ext  MSK: no cva TTP, no midline back ttp c-s spine

## 2018-06-06 NOTE — ED PROVIDER NOTE - OBJECTIVE STATEMENT
73M DM, HTN requesting Mandarin , #[id number] presenting with 3 days of periumbilical abdominal pain, unchanged in location, not radiating to the back, pt reports nausea, 1x emesis yesterday, no diarrhea no constipation, no black or bloody stools, no chest pain or difficulty breathing pain is worse with eating. No hx of abdominal surgeries. 73M DM, HTN requesting Mandarin , #[id number] presenting with 3 days of periumbilical abdominal pain, unchanged in location, not radiating to the back, pt reports nausea, 1x emesis yesterday, no diarrhea no constipation, no black or bloody stools, no chest pain or difficulty breathing pain is worse with eating. No hx of abdominal surgeries. Pt is intermittently somnolent but able to give hx upon waking, denies alcohol or drugs, states he is sleepy because he hasn't slept much.

## 2018-06-06 NOTE — ED ADULT NURSE REASSESSMENT NOTE - NS ED NURSE REASSESS COMMENT FT1
pt passed PO challenge with water and dry cereal. MD Moreland spoke with son who said this is pt's baseline mental status as he seems drowsy loses his train of thought.  phone again used to inform patient his scan was normal but he must follow up with Gastroenterologist. Pt verbalized understanding by repeating back instructions to provider. Son stated he will come pick the patient up at 630 as they live in [facility] pt not entirely safe to get home alone in the middle of the night

## 2018-06-06 NOTE — ED PROVIDER NOTE - PROGRESS NOTE DETAILS
Pt's CT scan is normal. Final read. Patient's phone number is [phone]. pt's son name is  [phone]. Pt's son agrees to pick him up at 6:30 AM, states father is confused at baseline. Pt states nl BM yesterday, awaiting  kavita PO.

## 2019-04-05 NOTE — ED ADULT NURSE NOTE - PATIENT/CAREGIVER ACCEPTED INTERPRETER SERVICES
Patient called asking which medication was she supposed to take prior to her procedure on 4/10/2019. Reminded patient to take her propranolol with a small sip of water the morning of her procedure. She verbalized understanding and agreement.   yes

## 2019-05-27 ENCOUNTER — EMERGENCY (EMERGENCY)
Facility: HOSPITAL | Age: 74
LOS: 1 days | Discharge: HOME | End: 2019-05-27
Attending: EMERGENCY MEDICINE | Admitting: EMERGENCY MEDICINE
Payer: MEDICARE

## 2019-05-27 VITALS
HEART RATE: 78 BPM | RESPIRATION RATE: 15 BRPM | SYSTOLIC BLOOD PRESSURE: 123 MMHG | DIASTOLIC BLOOD PRESSURE: 78 MMHG | TEMPERATURE: 98 F | OXYGEN SATURATION: 99 %

## 2019-05-27 VITALS
HEART RATE: 73 BPM | RESPIRATION RATE: 14 BRPM | WEIGHT: 130.07 LBS | OXYGEN SATURATION: 99 % | DIASTOLIC BLOOD PRESSURE: 58 MMHG | TEMPERATURE: 98 F | SYSTOLIC BLOOD PRESSURE: 97 MMHG

## 2019-05-27 DIAGNOSIS — R42 DIZZINESS AND GIDDINESS: ICD-10-CM

## 2019-05-27 DIAGNOSIS — M62.81 MUSCLE WEAKNESS (GENERALIZED): ICD-10-CM

## 2019-05-27 DIAGNOSIS — I10 ESSENTIAL (PRIMARY) HYPERTENSION: ICD-10-CM

## 2019-05-27 DIAGNOSIS — Z79.899 OTHER LONG TERM (CURRENT) DRUG THERAPY: ICD-10-CM

## 2019-05-27 DIAGNOSIS — R11.2 NAUSEA WITH VOMITING, UNSPECIFIED: ICD-10-CM

## 2019-05-27 DIAGNOSIS — E11.9 TYPE 2 DIABETES MELLITUS WITHOUT COMPLICATIONS: ICD-10-CM

## 2019-05-27 DIAGNOSIS — Z79.84 LONG TERM (CURRENT) USE OF ORAL HYPOGLYCEMIC DRUGS: ICD-10-CM

## 2019-05-27 LAB
ALBUMIN SERPL ELPH-MCNC: 3.9 G/DL — SIGNIFICANT CHANGE UP (ref 3.3–5)
ALP SERPL-CCNC: 58 U/L — SIGNIFICANT CHANGE UP (ref 40–120)
ALT FLD-CCNC: 88 U/L — HIGH (ref 10–45)
ANION GAP SERPL CALC-SCNC: 14 MMOL/L — SIGNIFICANT CHANGE UP (ref 5–17)
AST SERPL-CCNC: 23 U/L — SIGNIFICANT CHANGE UP (ref 10–40)
BASOPHILS # BLD AUTO: 0.01 K/UL — SIGNIFICANT CHANGE UP (ref 0–0.2)
BASOPHILS NFR BLD AUTO: 0.1 % — SIGNIFICANT CHANGE UP (ref 0–2)
BILIRUB SERPL-MCNC: 0.4 MG/DL — SIGNIFICANT CHANGE UP (ref 0.2–1.2)
BUN SERPL-MCNC: 23 MG/DL — SIGNIFICANT CHANGE UP (ref 7–23)
CALCIUM SERPL-MCNC: 9 MG/DL — SIGNIFICANT CHANGE UP (ref 8.4–10.5)
CHLORIDE SERPL-SCNC: 104 MMOL/L — SIGNIFICANT CHANGE UP (ref 96–108)
CO2 SERPL-SCNC: 23 MMOL/L — SIGNIFICANT CHANGE UP (ref 22–31)
CREAT SERPL-MCNC: 0.94 MG/DL — SIGNIFICANT CHANGE UP (ref 0.5–1.3)
EOSINOPHIL # BLD AUTO: 0.01 K/UL — SIGNIFICANT CHANGE UP (ref 0–0.5)
EOSINOPHIL NFR BLD AUTO: 0.1 % — SIGNIFICANT CHANGE UP (ref 0–6)
EXTRA BLUE TOP TUBE: SIGNIFICANT CHANGE UP
EXTRA SST TUBE: SIGNIFICANT CHANGE UP
GLUCOSE SERPL-MCNC: 144 MG/DL — HIGH (ref 70–99)
HCT VFR BLD CALC: 41.2 % — SIGNIFICANT CHANGE UP (ref 39–50)
HGB BLD-MCNC: 14 G/DL — SIGNIFICANT CHANGE UP (ref 13–17)
IMM GRANULOCYTES NFR BLD AUTO: 0.3 % — SIGNIFICANT CHANGE UP (ref 0–1.5)
LYMPHOCYTES # BLD AUTO: 0.58 K/UL — LOW (ref 1–3.3)
LYMPHOCYTES # BLD AUTO: 7.3 % — LOW (ref 13–44)
MCHC RBC-ENTMCNC: 30.6 PG — SIGNIFICANT CHANGE UP (ref 27–34)
MCHC RBC-ENTMCNC: 34 GM/DL — SIGNIFICANT CHANGE UP (ref 32–36)
MCV RBC AUTO: 90 FL — SIGNIFICANT CHANGE UP (ref 80–100)
MONOCYTES # BLD AUTO: 0.49 K/UL — SIGNIFICANT CHANGE UP (ref 0–0.9)
MONOCYTES NFR BLD AUTO: 6.2 % — SIGNIFICANT CHANGE UP (ref 2–14)
NEUTROPHILS # BLD AUTO: 6.82 K/UL — SIGNIFICANT CHANGE UP (ref 1.8–7.4)
NEUTROPHILS NFR BLD AUTO: 86 % — HIGH (ref 43–77)
NRBC # BLD: 0 /100 WBCS — SIGNIFICANT CHANGE UP (ref 0–0)
PLATELET # BLD AUTO: 99 K/UL — LOW (ref 150–400)
POTASSIUM SERPL-MCNC: 3.9 MMOL/L — SIGNIFICANT CHANGE UP (ref 3.5–5.3)
POTASSIUM SERPL-SCNC: 3.9 MMOL/L — SIGNIFICANT CHANGE UP (ref 3.5–5.3)
PROT SERPL-MCNC: 6.9 G/DL — SIGNIFICANT CHANGE UP (ref 6–8.3)
RBC # BLD: 4.58 M/UL — SIGNIFICANT CHANGE UP (ref 4.2–5.8)
RBC # FLD: 12.2 % — SIGNIFICANT CHANGE UP (ref 10.3–14.5)
SODIUM SERPL-SCNC: 141 MMOL/L — SIGNIFICANT CHANGE UP (ref 135–145)
WBC # BLD: 7.93 K/UL — SIGNIFICANT CHANGE UP (ref 3.8–10.5)
WBC # FLD AUTO: 7.93 K/UL — SIGNIFICANT CHANGE UP (ref 3.8–10.5)

## 2019-05-27 PROCEDURE — 0042T: CPT

## 2019-05-27 PROCEDURE — 80053 COMPREHEN METABOLIC PANEL: CPT

## 2019-05-27 PROCEDURE — 82962 GLUCOSE BLOOD TEST: CPT

## 2019-05-27 PROCEDURE — 70496 CT ANGIOGRAPHY HEAD: CPT

## 2019-05-27 PROCEDURE — 70450 CT HEAD/BRAIN W/O DYE: CPT

## 2019-05-27 PROCEDURE — 70498 CT ANGIOGRAPHY NECK: CPT | Mod: 26

## 2019-05-27 PROCEDURE — 85025 COMPLETE CBC W/AUTO DIFF WBC: CPT

## 2019-05-27 PROCEDURE — 85610 PROTHROMBIN TIME: CPT

## 2019-05-27 PROCEDURE — 36415 COLL VENOUS BLD VENIPUNCTURE: CPT

## 2019-05-27 PROCEDURE — 84484 ASSAY OF TROPONIN QUANT: CPT

## 2019-05-27 PROCEDURE — 96374 THER/PROPH/DIAG INJ IV PUSH: CPT | Mod: XU

## 2019-05-27 PROCEDURE — 99291 CRITICAL CARE FIRST HOUR: CPT | Mod: 25

## 2019-05-27 PROCEDURE — 85730 THROMBOPLASTIN TIME PARTIAL: CPT

## 2019-05-27 PROCEDURE — 70498 CT ANGIOGRAPHY NECK: CPT

## 2019-05-27 PROCEDURE — 70450 CT HEAD/BRAIN W/O DYE: CPT | Mod: 26,59

## 2019-05-27 PROCEDURE — 99291 CRITICAL CARE FIRST HOUR: CPT

## 2019-05-27 PROCEDURE — 70496 CT ANGIOGRAPHY HEAD: CPT | Mod: 26

## 2019-05-27 RX ORDER — SODIUM CHLORIDE 9 MG/ML
1000 INJECTION INTRAMUSCULAR; INTRAVENOUS; SUBCUTANEOUS ONCE
Refills: 0 | Status: COMPLETED | OUTPATIENT
Start: 2019-05-27 | End: 2019-05-27

## 2019-05-27 RX ORDER — MECLIZINE HCL 12.5 MG
25 TABLET ORAL ONCE
Refills: 0 | Status: COMPLETED | OUTPATIENT
Start: 2019-05-27 | End: 2019-05-27

## 2019-05-27 RX ORDER — MECLIZINE HCL 12.5 MG
1 TABLET ORAL
Qty: 42 | Refills: 0
Start: 2019-05-27 | End: 2019-06-09

## 2019-05-27 RX ORDER — ONDANSETRON 8 MG/1
4 TABLET, FILM COATED ORAL ONCE
Refills: 0 | Status: COMPLETED | OUTPATIENT
Start: 2019-05-27 | End: 2019-05-27

## 2019-05-27 RX ORDER — SODIUM CHLORIDE 9 MG/ML
1000 INJECTION INTRAMUSCULAR; INTRAVENOUS; SUBCUTANEOUS
Refills: 0 | Status: DISCONTINUED | OUTPATIENT
Start: 2019-05-27 | End: 2019-05-31

## 2019-05-27 RX ORDER — ONDANSETRON 8 MG/1
1 TABLET, FILM COATED ORAL
Qty: 20 | Refills: 0
Start: 2019-05-27

## 2019-05-27 RX ADMIN — SODIUM CHLORIDE 125 MILLILITER(S): 9 INJECTION INTRAMUSCULAR; INTRAVENOUS; SUBCUTANEOUS at 17:34

## 2019-05-27 RX ADMIN — SODIUM CHLORIDE 2000 MILLILITER(S): 9 INJECTION INTRAMUSCULAR; INTRAVENOUS; SUBCUTANEOUS at 17:56

## 2019-05-27 RX ADMIN — SODIUM CHLORIDE 2000 MILLILITER(S): 9 INJECTION INTRAMUSCULAR; INTRAVENOUS; SUBCUTANEOUS at 16:44

## 2019-05-27 RX ADMIN — Medication 25 MILLIGRAM(S): at 17:56

## 2019-05-27 RX ADMIN — ONDANSETRON 4 MILLIGRAM(S): 8 TABLET, FILM COATED ORAL at 17:33

## 2019-05-27 NOTE — ED PROVIDER NOTE - CRITICAL CARE PROVIDED
consultation with other physicians/consult w/ pt's family directly relating to pts condition/additional history taking/direct patient care (not related to procedure)/interpretation of diagnostic studies/documentation

## 2019-05-27 NOTE — ED PROVIDER NOTE - PROGRESS NOTE DETAILS
CT's neg, cta w mild stenosis; stroke team not concerned for stroke and recommend meclizine, additional fluids.  Pt feeling better.  Orthostatics neg. Trop neg.  Will cont to monitor and plan for dc if feeling better and tolerating po's.  Pt c/o hunger - food provided. CTA w mild carotid stenosis, o/w nl.  Pt tolerating po's, current sx, results and dc plan reviewed w pt w mandarin  [id number].  Will dc w meclizine, zofran, qian mckeon pmd.  Pt w steady gait in ed.

## 2019-05-27 NOTE — ED PROVIDER NOTE - OBJECTIVE STATEMENT
75 yo male h/o htn, hld, dm, diabetic nephropathy c/o feeling dizzy w n/v starting at 7a when pt went to the [employer].  Pt reports dizziness as feeling unsteady on his feet.  No ha, change in speech, numbness or weakness in ext but + blurred vision bilat eyes and + gen weakness in his legs.  Pt felt as if he might pass out at 7 and 10a today.  No cp, palpitations, sob, abd pain, sick contacts.  Pt did not have loc.  Pt did not eat breakfast today.  History via son interpreting per pt's preference.

## 2019-05-27 NOTE — ED PROVIDER NOTE - NEUROLOGICAL, MLM
awake, alert, oriented x 3, CN II-XII grossly intact, motor 4/5 R hand  o/w 5/5 in all other ext including RUE, no gross sens deficits, speech clear.

## 2019-05-27 NOTE — CONSULT NOTE ADULT - SUBJECTIVE AND OBJECTIVE BOX
**STROKE CODE CONSULT NOTE**    Last known well time/Time of onset of symptoms: 8:00 am 5/27/19    HPI: 74M with PMH of IDDM, HTN, HLD who presents with one episode of dizziness, associated with nausea, vomiting, decreased appetite that lasted from 8am-3pm today.     PAST MEDICAL & SURGICAL HISTORY:  Diabetic nephropathy  HTN (hypertension)  Hypercholesteremia  Diabetes  No significant past surgical history    FAMILY HISTORY:  No pertinent family history in first degree relatives    SOCIAL HISTORY:  Tobacco Use: Never smoker    ROS:  Constitutional: No fever, weight loss or fatigue  Eyes: No eye pain, visual disturbances, or discharge  ENMT:  No difficulty hearing, tinnitus, vertigo; No sinus or throat pain  Neck: No pain or stiffness  Respiratory: No cough, wheezing, chills or hemoptysis  Cardiovascular: No chest pain, palpitations, shortness of breath, dizziness or leg swelling  Gastrointestinal: No abdominal pain. No nausea, vomiting or hematemesis; No diarrhea or constipation. Nohematochezia.  Genitourinary: No dysuria, frequency, hematuria or incontinence  Neurological: As per HPI  Skin: No itching, burning, rashes or lesions   Endocrine: No heat or cold intolerance; No hair loss  Musculoskeletal: No joint pain or swelling; No muscle, back or extremity pain  Psychiatric: No depression, anxiety, mood swings or difficulty sleeping  Heme/Lymph: No easy bruising or bleeding gums    MEDICATIONS  (STANDING):  ondansetron Injectable 4 milliGRAM(s) IV Push once  sodium chloride 0.9% Bolus 1000 milliLiter(s) IV Bolus once  sodium chloride 0.9%. 1000 milliLiter(s) (125 mL/Hr) IV Continuous <Continuous>    Allergies  No Known Allergies    Vital Signs Last 24 Hrs  T(C): 36.4 (27 May 2019 16:12), Max: 36.4 (27 May 2019 16:12)  T(F): 97.6 (27 May 2019 16:12), Max: 97.6 (27 May 2019 16:12)  HR: 73 (27 May 2019 16:12) (73 - 73)  BP: 97/58 (27 May 2019 16:12) (97/58 - 97/58)  BP(mean): --  RR: 14 (27 May 2019 16:12) (14 - 14)  SpO2: 99% (27 May 2019 16:12) (99% - 99%)    PHYSICAL EXAM:  Constitutional: WDWN; NAD  Cardiovascular: RRR, no murmurs, no carotid bruits  Neurologic:  Mental status: AAOx3.  Recent and remote memory intact.  Naming, repetition and comprehension intact.  Attention/concentration intact.  No dysarthria, no aphasia.  Fund of knowledge appropriate.    Cranial nerves: PERRLA, visual fields full, no nystagmus, EOMI, V1 through V3 intact bilaterally and symmetric, face symmetric, hearing intact to finger rub, palate elevation symmetric, tongue was midline, sternocleidomastoid/shoulder shrug strength bilaterally 5/5.    Motor:  Normal bulk and tone, strength 5/5 in bilateral upper and lower extremities.   strength 5/5.  Rapid alternating movements intact and symmetric.   Sensation: Intact to light touch, proprioception, and pinprick.  No neglect.   Coordination: No dysmetria on finger-to-nose and heel-to-shin.   Reflexes: 2+ in upper and lower extremities, downgoing toes bilaterally  Gait: Narrow and steady. No ataxia.  Romberg negative    NIHSS: 0    Fingerstick Blood Glucose: CAPILLARY BLOOD GLUCOSE  288 (27 May 2019 17:15)     LABS: Pending        RADIOLOGY & ADDITIONAL STUDIES:    IV-tPA (Y/N):   N                               Reason IV-tPA not given: **STROKE CODE CONSULT NOTE**    Last known well time/Time of onset of symptoms: 8:00 am 5/27/19    HPI: 74M Mandarin speaking with PMH of IDDM, HTN, HLD who presents with 2-3 weeks of intermittent dizziness, associated with nausea, vomiting, decreased appetite and unsteady gait. Son at bedside to help translate and provide supplemental history. Dizziness started gradually today starting at 8am and lasting until approximately 3pm before improving.  Dizziness is described as the room spinning. Patient has not been eating well recently. Patient denies recent illness or travel. Denies recent head trauma. Denies sore throat, ear pain, decreased hearing, cough, SOB, CP, abdominal pain, diarrhea. No FHx of stroke or MI. Stroke code activated in the ED. NIHSS 0. CT imaging negative.     PAST MEDICAL & SURGICAL HISTORY:  Diabetic nephropathy  HTN (hypertension)  Hypercholesteremia  Diabetes  No significant past surgical history    FAMILY HISTORY:  No pertinent family history in first degree relatives    SOCIAL HISTORY:  Tobacco Use: Never smoker    ROS:  Constitutional: No fever, weight loss or fatigue  Eyes: No eye pain, visual disturbances, or discharge  ENMT:  No difficulty hearing, tinnitus, vertigo; No sinus or throat pain  Neck: No pain or stiffness  Respiratory: No cough, wheezing, chills or hemoptysis  Cardiovascular: No chest pain, palpitations, shortness of breath, dizziness or leg swelling  Gastrointestinal: No abdominal pain. No nausea, vomiting or hematemesis; No diarrhea or constipation. Nohematochezia.  Genitourinary: No dysuria, frequency, hematuria or incontinence  Neurological: As per HPI  Skin: No itching, burning, rashes or lesions   Endocrine: No heat or cold intolerance; No hair loss  Musculoskeletal: No joint pain or swelling; No muscle, back or extremity pain  Psychiatric: No depression, anxiety, mood swings or difficulty sleeping  Heme/Lymph: No easy bruising or bleeding gums    MEDICATIONS  (STANDING):  ondansetron Injectable 4 milliGRAM(s) IV Push once  sodium chloride 0.9% Bolus 1000 milliLiter(s) IV Bolus once  sodium chloride 0.9%. 1000 milliLiter(s) (125 mL/Hr) IV Continuous <Continuous>    Allergies  No Known Allergies    Vital Signs Last 24 Hrs  T(C): 36.4 (27 May 2019 16:12), Max: 36.4 (27 May 2019 16:12)  T(F): 97.6 (27 May 2019 16:12), Max: 97.6 (27 May 2019 16:12)  HR: 73 (27 May 2019 16:12) (73 - 73)  BP: 97/58 (27 May 2019 16:12) (97/58 - 97/58)  BP(mean): --  RR: 14 (27 May 2019 16:12) (14 - 14)  SpO2: 99% (27 May 2019 16:12) (99% - 99%)    PHYSICAL EXAM:  Constitutional: WDWN; NAD  Cardiovascular: RRR, no murmurs, no carotid bruits  Neurologic:  Mental status: AAOx3.  Recent and remote memory intact.  Naming, repetition and comprehension intact.  Attention/concentration intact.  No dysarthria, no aphasia.  Fund of knowledge appropriate.    Cranial nerves: PERRLA, visual fields full, no nystagmus, EOMI, V1 through V3 intact bilaterally and symmetric, face symmetric, hearing intact to finger rub, palate elevation symmetric, tongue was midline, sternocleidomastoid/shoulder shrug strength bilaterally 5/5.    Motor:  Normal bulk and tone, strength 5/5 in bilateral upper and lower extremities.   strength 5/5.  Rapid alternating movements intact and symmetric.   Sensation: Intact to light touch, proprioception, and pinprick.  No neglect.   Coordination: No dysmetria on finger-to-nose and heel-to-shin.   Reflexes: 2+ in upper and lower extremities, downgoing toes bilaterally  Gait: Narrow and steady. No ataxia.  Romberg negative    NIHSS: 0    Fingerstick Blood Glucose: CAPILLARY BLOOD GLUCOSE  288 (27 May 2019 17:15)     LABS: Pending        RADIOLOGY & ADDITIONAL STUDIES:    IV-tPA (Y/N):   N                               Reason IV-tPA not given:

## 2019-05-27 NOTE — ED PROVIDER NOTE - CLINICAL SUMMARY MEDICAL DECISION MAKING FREE TEXT BOX
Pt c/o dizziness, unsteadiness, near syncope, n/v today w poss R hand weak  but no other neuro deficits.  Stroke code called at time of my initial eval (pt had already been in ed ~ 1h15m).  EKG nl.  FS nl.  BP slightly low and poor po's today - ? dehydration rather than stroke.  ? vertigo.   Plan labs, ct/cta's, ivf, zofran, reassess.

## 2019-05-27 NOTE — ED ADULT NURSE NOTE - OBJECTIVE STATEMENT
74y M, A&ox3, presents to ed for episode of dizziness with nausea and vomitting since this am around 0800 pet pt. Pt reports has been decreased po intake. Noted pt hypotensive on arrival, iv started and fluids given. No headache, no facial droop, no slurring of speech, strength and sensation equal bilateral. Denies abdominal pain nor diarrhea. Discussed with Dr  in regards to dizziness. Pt stroke code called for dizziness and brought to ct. 74y M, A&ox3, presents to ed for episode of dizziness with nausea and vomiting since this am around 0800 pet pt. Pt reports has been decreased po intake and did not eat earlier today. Reports had near syncopal episode today. Noted pt hypotensive on arrival, iv started and fluids given. No headache, no facial droop, no slurring of speech, strength and sensation equal bilateral. Denies abdominal pain nor diarrhea. Discussed with Dr Director in regards to dizziness. Pt stroke code called for dizziness and brought to ct.

## 2019-05-27 NOTE — ED PROVIDER NOTE - NSFOLLOWUPINSTRUCTIONS_ED_ALL_ED_FT
Vertigo    Take Zofran - 1 tab on tongue every 6 hours as needed for nausea. Take 1 tab every 8 hours as needed for dizziness/vertigo. Start with clear liquids (water, juice, soda, jello, soup).  Advance to bananas, rice, apple sauce, toast (BRAT) if tolerating clears.  Advance to full diet once tolerating BRAT diet. Follow up with your pmd.  Return for increased dizziness, change in behavior, change in vision/speech/gait, numbness or weakness in extremities, chest pain, difficulty breathing, vomiting, any other concerns.     Vertigo  Vertigo is the feeling that you or your surroundings are moving when they are not. Vertigo can be dangerous if it occurs while you are doing something that could endanger you or others, such as driving.    What are the causes?  This condition is caused by a disturbance in the signals that are sent by your body’s sensory systems to your brain. Different causes of a disturbance can lead to vertigo, including:  Infections, especially in the inner ear.  A bad reaction to a drug, or misuse of alcohol and medicines.  Withdrawal from drugs or alcohol.  Quickly changing positions, as when lying down or rolling over in bed.  Migraine headaches.  Decreased blood flow to the brain.  Decreased blood pressure.  Increased pressure in the brain from a head or neck injury, stroke, infection, tumor, or bleeding.  Central nervous system disorders.  What are the signs or symptoms?  Symptoms of this condition usually occur when you move your head or your eyes in different directions. Symptoms may start suddenly, and they usually last for less than a minute. Symptoms may include:  Loss of balance and falling.  Feeling like you are spinning or moving.  Feeling like your surroundings are spinning or moving.  Nausea and vomiting.  Blurred vision or double vision.  Difficulty hearing.  Slurred speech.  Dizziness.  Involuntary eye movement (nystagmus).  Symptoms can be mild and cause only slight annoyance, or they can be severe and interfere with daily life. Episodes of vertigo may return (recur) over time, and they are often triggered by certain movements. Symptoms may improve over time.    How is this diagnosed?  This condition may be diagnosed based on medical history and the quality of your nystagmus. Your health care provider may test your eye movements by asking you to quickly change positions to trigger the nystagmus. This may be called the Tommy-Hallpike test, head thrust test, or roll test. You may be referred to a health care provider who specializes in ear, nose, and throat (ENT) problems (otolaryngologist) or a provider who specializes in disorders of the central nervous system (neurologist).    You may have additional testing, including:  A physical exam.  Blood tests.  MRI.  A CT scan.  An electrocardiogram (ECG). This records electrical activity in your heart.  An electroencephalogram (EEG). This records electrical activity in your brain.  Hearing tests.  How is this treated?  Treatment for this condition depends on the cause and the severity of the symptoms. Treatment options include:  Medicines to treat nausea or vertigo. These are usually used for severe cases. Some medicines that are used to treat other conditions may also reduce or eliminate vertigo symptoms. These include:  Medicines that control allergies (antihistamines).  Medicines that control seizures (anticonvulsants).  Medicines that relieve depression (antidepressants).  Medicines that relieve anxiety (sedatives).  Head movements to adjust your inner ear back to normal. If your vertigo is caused by an ear problem, your health care provider may recommend certain movements to correct the problem.  Surgery. This is rare.  Follow these instructions at home:  Safety     Move slowly.Avoid sudden body or head movements.  Avoid driving.  Avoid operating heavy machinery.  Avoid doing any tasks that would cause danger to you or others if you would have a vertigo episode during the task.  If you have trouble walking or keeping your balance, try using a cane for stability. If you feel dizzy or unstable, sit down right away.  Return to your normal activities as told by your health care provider. Ask your health care provider what activities are safe for you.  General instructions     Take over-the-counter and prescription medicines only as told by your health care provider.  Avoid certain positions or movements as told by your health care provider.  Drink enough fluid to keep your urine clear or pale yellow.  Keep all follow-up visits as told by your health care provider. This is important.  Contact a health care provider if:  Your medicines do not relieve your vertigo or they make it worse.  You have a fever.  Your condition gets worse or you develop new symptoms.  Your family or friends notice any behavioral changes.  Your nausea or vomiting gets worse.  You have numbness or a “pins and needles” sensation in part of your body.

## 2019-05-27 NOTE — ED ADULT TRIAGE NOTE - CHIEF COMPLAINT QUOTE
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

## 2019-05-27 NOTE — ED ADULT NURSE NOTE - NSIMPLEMENTINTERV_GEN_ALL_ED
Implemented All Universal Safety Interventions:  [place] to call system. Call bell, personal items and telephone within reach. Instruct patient to call for assistance. Room bathroom lighting operational. Non-slip footwear when patient is off stretcher. Physically safe environment: no spills, clutter or unnecessary equipment. Stretcher in lowest position, wheels locked, appropriate side rails in place.

## 2019-05-27 NOTE — CONSULT NOTE ADULT - ASSESSMENT
74M with PMH of IDDM, HTN, HLD who presents with 3 weeks of dizziness described as the room spinning, with most recent lasting from 8am-3pm today, associated with nausea, vomiting, decreased appetite and unsteady gait.  Stroke code activated in ED.    #Dizziness- NIHSS 0. CTH/CTP/CTA negative for acute infraction or hemorrhage; showing stable encephalomalacia. . SBP 90s improved to 110/64 with 250cc bolus of NS. Differential including orthostatic hypotension, peripheral vertigo, BPPV. Less likely stroke/TIA.  - continue IVF if no contraindications  - obtain orthostatics  - consider treatment with Meclizine, anti-emetics and reassess  - not candidate for tPA, low suspicion for stroke    Case discussed with neurology attending.

## 2019-05-27 NOTE — ED ADULT NURSE NOTE - CHIEF COMPLAINT QUOTE
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

## 2019-06-27 ENCOUNTER — EMERGENCY (EMERGENCY)
Facility: HOSPITAL | Age: 74
LOS: 1 days | Discharge: HOME | End: 2019-06-27
Attending: EMERGENCY MEDICINE | Admitting: EMERGENCY MEDICINE
Payer: MEDICARE

## 2019-06-27 VITALS
RESPIRATION RATE: 18 BRPM | OXYGEN SATURATION: 98 % | TEMPERATURE: 98 F | HEART RATE: 102 BPM | DIASTOLIC BLOOD PRESSURE: 81 MMHG | SYSTOLIC BLOOD PRESSURE: 158 MMHG

## 2019-06-27 VITALS
SYSTOLIC BLOOD PRESSURE: 198 MMHG | TEMPERATURE: 99 F | HEIGHT: 68 IN | WEIGHT: 143.96 LBS | OXYGEN SATURATION: 98 % | RESPIRATION RATE: 22 BRPM | HEART RATE: 102 BPM | DIASTOLIC BLOOD PRESSURE: 107 MMHG

## 2019-06-27 DIAGNOSIS — Z79.84 LONG TERM (CURRENT) USE OF ORAL HYPOGLYCEMIC DRUGS: ICD-10-CM

## 2019-06-27 DIAGNOSIS — Z79.4 LONG TERM (CURRENT) USE OF INSULIN: ICD-10-CM

## 2019-06-27 DIAGNOSIS — E11.9 TYPE 2 DIABETES MELLITUS WITHOUT COMPLICATIONS: ICD-10-CM

## 2019-06-27 DIAGNOSIS — K59.00 CONSTIPATION, UNSPECIFIED: ICD-10-CM

## 2019-06-27 DIAGNOSIS — K56.41 FECAL IMPACTION: ICD-10-CM

## 2019-06-27 PROCEDURE — 99283 EMERGENCY DEPT VISIT LOW MDM: CPT

## 2019-06-27 RX ORDER — POLYETHYLENE GLYCOL 3350 17 G/17G
17 POWDER, FOR SOLUTION ORAL
Qty: 1 | Refills: 0
Start: 2019-06-27 | End: 2019-07-03

## 2019-06-27 RX ADMIN — Medication 1 ENEMA: at 21:33

## 2019-06-27 NOTE — ED ADULT NURSE NOTE - OBJECTIVE STATEMENT
mandarin speaking only.  used ID [id number] [place]. Pt complains of rectum pain 10/10. Stated stool is "stuck." Last BM last night but unable to fully evacuate. Pt denies any n.v.d., fever or chest pain.

## 2019-06-27 NOTE — ED ADULT NURSE NOTE - DOES PATIENT HAVE ADVANCE DIRECTIVE
sSubjective:      Patient ID: Denice Zhou is a 4 y.o. female.    Chief Complaint: Leg Injury (Patient is here today to possibly get a shorter cast, her right tibia/fibia is doing well with no pain score.)    Patient is here today with complaints of right lower leg injury. She was jumping on the trampoline with her brother, when he double jumped her and she fell. She was seen in the ED on Saturday, where xrays were done and she was placed in a short leg posterior splint. Pain 5/10 per pain scale. Patient is here today for 3 week follow up. Doing well in long leg cast.         Review of patient's allergies indicates:  No Known Allergies    History reviewed. No pertinent past medical history.  History reviewed. No pertinent surgical history.  History reviewed. No pertinent family history.    Current Outpatient Medications on File Prior to Visit   Medication Sig Dispense Refill    pediatric multivitamin chewable tablet Take 1 tablet by mouth once daily.      acetaminophen (TYLENOL) 100 mg/mL suspension Take by mouth every 4 (four) hours as needed for Temperature greater than.       No current facility-administered medications on file prior to visit.        Social History     Social History Narrative    Patient lives with mom and dad    2 brothers 3 sisters    9 cats 3 dogs    Dad smokes outside the house    Stays home with mom no        Review of Systems   Constitution: Negative for chills, fever, weakness and malaise/fatigue.   Cardiovascular: Negative for chest pain and dyspnea on exertion.   Respiratory: Negative for cough and shortness of breath.    Skin: Negative for color change, dry skin, itching, nail changes, rash and suspicious lesions.   Musculoskeletal: Positive for joint pain (right ankle) and joint swelling.   Neurological: Negative for dizziness, numbness and paresthesias.         Objective:      General    Development well-developed   Nutrition well-nourished   Body Habitus normal weight    Mood no distress    Speech normal    Tone normal        Spine    Tone tone             Vascular Exam  Posterior Tibial pulse Right 2+ Left 2+   Dorsalis Pectus pulse Right 2+ Left 2+       Upper          Wrist  Stability no Right Wrist Unstable   no Left Wrist Unstable           Lower        Lower Leg  Tenderness Right tibia   Left no tenderness   Alignment Right no deformity    Left no deformity      Ankle  Tenderness   Left none   Range of Motion Dorsiflexion:   Right normal    Left normal  Plantarflexion:   Right normal    Left normal  Eversion:   Right normal    Left normal  Inversion:   Right normal    Left normal    Stability no anterior drawer  no hyperpronation    no anterior drawer  no hyperpronation    Muscle Strength normal right ankle strength  normal left ankle strength    Alignment Right normal   Left normal     Swelling Right mild and swelling normal   Left no swelling       Foot  Tenderness Right no tenderness    Left no tenderness    Swelling Right no swelling    Left no swelling     Alignment none   Normal                Normal                 Extremity  Gait antalgic and non-ambulatory   Sensation Right normal  Left normal   Pulse Right 2+  Left 2+  Right 2+  Left 2+             xrays by my read show healing mildly displaced torus fracture to right distal tibia       Assessment:       No diagnosis found.       Plan:       Placed in short leg cast, applied by Shayna cast tech, patient tolerated well. ..Cast care instructions reviewed and printed handout given to patient. RICE principles reviewed with patient. May continue Motrin as directed. RTC in 3 week with xrays of right tib/fib out of CAST. All questions answered, card provided.     No Follow-up on file.           No

## 2019-06-27 NOTE — ED ADULT NURSE NOTE - CHPI ED NUR SYMPTOMS NEG
no chills/no hematuria/no nausea/no diarrhea/no vomiting/no fever/no burning urination/no dysuria/no abdominal distension

## 2019-06-27 NOTE — ED PROVIDER NOTE - NSFOLLOWUPINSTRUCTIONS_ED_ALL_ED_FT
Fecal Impaction  Image   A fecal impaction is a large, firm amount of stool (feces) that will not pass out of the body. A fecal impaction usually occurs in the end of the large intestine (rectum). It can block the large intestine and cause significant problems.    What are the causes?  This condition may be caused by anything that slows down bowel movements, including:  Long-term use of medicines that help you have a bowel movement (laxatives).  Constipation.  Pain in the rectum. Fecal impaction can occur if you avoid having bowel movements due to the pain. Pain in the rectum can result from a medical condition, such as hemorrhoids or anal fissures.  Narcotic pain-relieving medicines, such as methadone, morphine, or codeine.  Not drinking enough fluids.  Being inactive for a long period of time.  Diseases of the brain or nervous system that damage nerves that control the muscles of the intestines.  What are the signs or symptoms?  Symptoms of this condition include:  Breathing problems.  Nausea, vomiting, and dehydration.  Dizziness.  Confusion.  Rapid heartbeat.  Fever.  Sweating.  Changes in blood pressure.  Not having a normal number of bowel movements.  Changes in bowel patterns. This may include going to the bathroom less often or not at all.  A sense of fullness in the rectum but being unable to pass stool.  Pain or cramps in the abdominal area. These often happen after meals.  Thin, watery discharge from the rectum.  How is this diagnosed?  This condition may be diagnosed based on your symptoms and an exam of your rectum. Sometimes X-rays or lab tests are done to confirm the diagnosis and to check for other problems.    How is this treated?  This condition may be treated by:  Having your health care provider remove the stool using a gloved finger.  Taking medicine.  A suppository or enema given in the rectum to soften the stool, which can stimulate a bowel movement.  Follow these instructions at home:  Eating and drinking     Image   Drink enough fluid to keep your urine clear or pale yellow.  Include a lot of fiber in your diet. Foods with a lot of fiber include fruits, vegetables, and oatmeal.  If you begin to get constipated, increase the amount of fiber in your diet.  General instructions     Develop bowel habits. An example of a bowel habit is having a bowel movement right after breakfast every day. Be sure to give yourself enough time on the toilet. This may require using enemas, bowel softeners, or suppositories at home, as directed by your health care provider. It may also include using mineral oil or olive oil.  Exercise regularly.  Take over-the-counter and prescription medicines only as told by your health care provider.  Contact a health care provider if:  You have ongoing pain in your rectum.  You need to use an enema or a suppository more than 2 times a week.  You have rectal bleeding.  You continue to have problems. The problems may include not being able to go to the bathroom and long-term (chronic) constipation.  You have pain in your abdomen.  You have thin, pencil-like stools.  Get help right away if:  You have black or tarry stools.  This information is not intended to replace advice given to you by your health care provider. Make sure you discuss any questions you have with your health care provider.    Document Released: 09/09/2005 Document Revised: 07/21/2017 Document Reviewed: 06/22/2017  [employer] Interactive Patient Education © 2019 [employer]

## 2019-06-27 NOTE — ED PROVIDER NOTE - OBJECTIVE STATEMENT
74 m co rectal discomfort, difficulty passing stool- last bm was 3 days ago- feels pressure in rectum, but has been unable to have bm- tried pulling out stool with his fingers- had similar problem in the past required disimpaction  urinating normally  no n/v no abd pain  mild-moderate severity

## 2019-06-27 NOTE — ED PROVIDER NOTE - CLINICAL SUMMARY MEDICAL DECISION MAKING FREE TEXT BOX
disimpacted rectum, large amount of stool removed- then had large bm- stable for d/c, enema, miralax disimpacted rectum, large amount of stool removed- then had large bm- abd soft, stable for d/c, enema, miralax

## 2019-07-24 ENCOUNTER — INPATIENT (INPATIENT)
Facility: HOSPITAL | Age: 74
LOS: 2 days | Discharge: HOME | DRG: 871 | End: 2019-07-27
Attending: STUDENT IN AN ORGANIZED HEALTH CARE EDUCATION/TRAINING PROGRAM | Admitting: STUDENT IN AN ORGANIZED HEALTH CARE EDUCATION/TRAINING PROGRAM
Payer: MEDICARE

## 2019-07-24 VITALS
HEART RATE: 107 BPM | TEMPERATURE: 99 F | RESPIRATION RATE: 17 BRPM | SYSTOLIC BLOOD PRESSURE: 150 MMHG | OXYGEN SATURATION: 99 % | DIASTOLIC BLOOD PRESSURE: 82 MMHG

## 2019-07-24 DIAGNOSIS — R73.9 HYPERGLYCEMIA, UNSPECIFIED: ICD-10-CM

## 2019-07-24 DIAGNOSIS — I10 ESSENTIAL (PRIMARY) HYPERTENSION: ICD-10-CM

## 2019-07-24 DIAGNOSIS — Z91.89 OTHER SPECIFIED PERSONAL RISK FACTORS, NOT ELSEWHERE CLASSIFIED: ICD-10-CM

## 2019-07-24 DIAGNOSIS — R63.8 OTHER SYMPTOMS AND SIGNS CONCERNING FOOD AND FLUID INTAKE: ICD-10-CM

## 2019-07-24 DIAGNOSIS — L03.90 CELLULITIS, UNSPECIFIED: ICD-10-CM

## 2019-07-24 DIAGNOSIS — A41.9 SEPSIS, UNSPECIFIED ORGANISM: ICD-10-CM

## 2019-07-24 DIAGNOSIS — E11.9 TYPE 2 DIABETES MELLITUS WITHOUT COMPLICATIONS: ICD-10-CM

## 2019-07-24 LAB
ALBUMIN SERPL ELPH-MCNC: 4.3 G/DL — SIGNIFICANT CHANGE UP (ref 3.3–5)
ALP SERPL-CCNC: 102 U/L — SIGNIFICANT CHANGE UP (ref 40–120)
ALT FLD-CCNC: 16 U/L — SIGNIFICANT CHANGE UP (ref 10–45)
ANION GAP SERPL CALC-SCNC: 22 MMOL/L — HIGH (ref 5–17)
APPEARANCE UR: CLEAR — SIGNIFICANT CHANGE UP
APTT BLD: 28 SEC — SIGNIFICANT CHANGE UP (ref 27.5–36.3)
AST SERPL-CCNC: 19 U/L — SIGNIFICANT CHANGE UP (ref 10–40)
BASE EXCESS BLDV CALC-SCNC: -7.1 MMOL/L — SIGNIFICANT CHANGE UP
BASOPHILS # BLD AUTO: 0.02 K/UL — SIGNIFICANT CHANGE UP (ref 0–0.2)
BASOPHILS NFR BLD AUTO: 0.2 % — SIGNIFICANT CHANGE UP (ref 0–2)
BILIRUB SERPL-MCNC: 0.8 MG/DL — SIGNIFICANT CHANGE UP (ref 0.2–1.2)
BILIRUB UR-MCNC: NEGATIVE — SIGNIFICANT CHANGE UP
BUN SERPL-MCNC: 12 MG/DL — SIGNIFICANT CHANGE UP (ref 7–23)
BUN SERPL-MCNC: 15 MG/DL — SIGNIFICANT CHANGE UP (ref 7–23)
CA-I SERPL-SCNC: 1.15 MMOL/L — SIGNIFICANT CHANGE UP (ref 1.12–1.3)
CALCIUM SERPL-MCNC: 7.6 MG/DL — LOW (ref 8.4–10.5)
CALCIUM SERPL-MCNC: 9.1 MG/DL — SIGNIFICANT CHANGE UP (ref 8.4–10.5)
CHLORIDE SERPL-SCNC: 104 MMOL/L — SIGNIFICANT CHANGE UP (ref 96–108)
CHLORIDE SERPL-SCNC: 94 MMOL/L — LOW (ref 96–108)
CO2 SERPL-SCNC: 14 MMOL/L — LOW (ref 22–31)
CO2 SERPL-SCNC: 17 MMOL/L — LOW (ref 22–31)
COLOR SPEC: YELLOW — SIGNIFICANT CHANGE UP
CREAT SERPL-MCNC: 0.65 MG/DL — SIGNIFICANT CHANGE UP (ref 0.5–1.3)
CREAT SERPL-MCNC: 0.75 MG/DL — SIGNIFICANT CHANGE UP (ref 0.5–1.3)
DIFF PNL FLD: NEGATIVE — SIGNIFICANT CHANGE UP
EOSINOPHIL # BLD AUTO: 0.01 K/UL — SIGNIFICANT CHANGE UP (ref 0–0.5)
EOSINOPHIL NFR BLD AUTO: 0.1 % — SIGNIFICANT CHANGE UP (ref 0–6)
ETHANOL SERPL-MCNC: <10 MG/DL — SIGNIFICANT CHANGE UP (ref 0–10)
GAS PNL BLDV: 130 MMOL/L — LOW (ref 138–146)
GAS PNL BLDV: SIGNIFICANT CHANGE UP
GLUCOSE BLDC GLUCOMTR-MCNC: 241 MG/DL — HIGH (ref 70–99)
GLUCOSE SERPL-MCNC: 279 MG/DL — HIGH (ref 70–99)
GLUCOSE SERPL-MCNC: 386 MG/DL — HIGH (ref 70–99)
GLUCOSE UR QL: 500
HCO3 BLDV-SCNC: 18 MMOL/L — LOW (ref 20–27)
HCT VFR BLD CALC: 45.7 % — SIGNIFICANT CHANGE UP (ref 39–50)
HGB BLD-MCNC: 15.1 G/DL — SIGNIFICANT CHANGE UP (ref 13–17)
IMM GRANULOCYTES NFR BLD AUTO: 0.5 % — SIGNIFICANT CHANGE UP (ref 0–1.5)
INR BLD: 0.91 — SIGNIFICANT CHANGE UP (ref 0.88–1.16)
KETONES UR-MCNC: >=80 MG/DL
LACTATE SERPL-SCNC: 1.9 MMOL/L — SIGNIFICANT CHANGE UP (ref 0.5–2)
LEUKOCYTE ESTERASE UR-ACNC: NEGATIVE — SIGNIFICANT CHANGE UP
LYMPHOCYTES # BLD AUTO: 0.63 K/UL — LOW (ref 1–3.3)
LYMPHOCYTES # BLD AUTO: 5.5 % — LOW (ref 13–44)
MCHC RBC-ENTMCNC: 30 PG — SIGNIFICANT CHANGE UP (ref 27–34)
MCHC RBC-ENTMCNC: 33 GM/DL — SIGNIFICANT CHANGE UP (ref 32–36)
MCV RBC AUTO: 90.7 FL — SIGNIFICANT CHANGE UP (ref 80–100)
MONOCYTES # BLD AUTO: 0.85 K/UL — SIGNIFICANT CHANGE UP (ref 0–0.9)
MONOCYTES NFR BLD AUTO: 7.4 % — SIGNIFICANT CHANGE UP (ref 2–14)
NEUTROPHILS # BLD AUTO: 9.97 K/UL — HIGH (ref 1.8–7.4)
NEUTROPHILS NFR BLD AUTO: 86.3 % — HIGH (ref 43–77)
NITRITE UR-MCNC: NEGATIVE — SIGNIFICANT CHANGE UP
NRBC # BLD: 0 /100 WBCS — SIGNIFICANT CHANGE UP (ref 0–0)
PCO2 BLDV: 35 MMHG — LOW (ref 41–51)
PH BLDV: 7.32 — SIGNIFICANT CHANGE UP (ref 7.32–7.43)
PH UR: 6 — SIGNIFICANT CHANGE UP (ref 5–8)
PLATELET # BLD AUTO: 106 K/UL — LOW (ref 150–400)
PO2 BLDV: 35 MMHG — SIGNIFICANT CHANGE UP
POTASSIUM BLDV-SCNC: 5 MMOL/L — HIGH (ref 3.5–4.9)
POTASSIUM SERPL-MCNC: 4.2 MMOL/L — SIGNIFICANT CHANGE UP (ref 3.5–5.3)
POTASSIUM SERPL-MCNC: 4.8 MMOL/L — SIGNIFICANT CHANGE UP (ref 3.5–5.3)
POTASSIUM SERPL-SCNC: 4.2 MMOL/L — SIGNIFICANT CHANGE UP (ref 3.5–5.3)
POTASSIUM SERPL-SCNC: 4.8 MMOL/L — SIGNIFICANT CHANGE UP (ref 3.5–5.3)
PROT SERPL-MCNC: 8 G/DL — SIGNIFICANT CHANGE UP (ref 6–8.3)
PROT UR-MCNC: NEGATIVE MG/DL — SIGNIFICANT CHANGE UP
PROTHROM AB SERPL-ACNC: 10.2 SEC — SIGNIFICANT CHANGE UP (ref 10–12.9)
RBC # BLD: 5.04 M/UL — SIGNIFICANT CHANGE UP (ref 4.2–5.8)
RBC # FLD: 11.9 % — SIGNIFICANT CHANGE UP (ref 10.3–14.5)
SAO2 % BLDV: 60 % — SIGNIFICANT CHANGE UP
SODIUM SERPL-SCNC: 130 MMOL/L — LOW (ref 135–145)
SODIUM SERPL-SCNC: 136 MMOL/L — SIGNIFICANT CHANGE UP (ref 135–145)
SP GR SPEC: 1.02 — SIGNIFICANT CHANGE UP (ref 1–1.03)
TROPONIN T SERPL-MCNC: <0.01 NG/ML — SIGNIFICANT CHANGE UP (ref 0–0.01)
UROBILINOGEN FLD QL: 0.2 E.U./DL — SIGNIFICANT CHANGE UP
WBC # BLD: 11.54 K/UL — HIGH (ref 3.8–10.5)
WBC # FLD AUTO: 11.54 K/UL — HIGH (ref 3.8–10.5)

## 2019-07-24 PROCEDURE — 93010 ELECTROCARDIOGRAM REPORT: CPT

## 2019-07-24 PROCEDURE — 71045 X-RAY EXAM CHEST 1 VIEW: CPT | Mod: 26

## 2019-07-24 PROCEDURE — 99223 1ST HOSP IP/OBS HIGH 75: CPT | Mod: GC

## 2019-07-24 PROCEDURE — 99291 CRITICAL CARE FIRST HOUR: CPT

## 2019-07-24 RX ORDER — SODIUM CHLORIDE 9 MG/ML
1000 INJECTION, SOLUTION INTRAVENOUS
Refills: 0 | Status: DISCONTINUED | OUTPATIENT
Start: 2019-07-24 | End: 2019-07-27

## 2019-07-24 RX ORDER — SODIUM CHLORIDE 9 MG/ML
2000 INJECTION INTRAMUSCULAR; INTRAVENOUS; SUBCUTANEOUS ONCE
Refills: 0 | Status: COMPLETED | OUTPATIENT
Start: 2019-07-24 | End: 2019-07-24

## 2019-07-24 RX ORDER — AZITHROMYCIN 500 MG/1
500 TABLET, FILM COATED ORAL ONCE
Refills: 0 | Status: COMPLETED | OUTPATIENT
Start: 2019-07-24 | End: 2019-07-24

## 2019-07-24 RX ORDER — SODIUM CHLORIDE 9 MG/ML
1000 INJECTION INTRAMUSCULAR; INTRAVENOUS; SUBCUTANEOUS
Refills: 0 | Status: DISCONTINUED | OUTPATIENT
Start: 2019-07-24 | End: 2019-07-25

## 2019-07-24 RX ORDER — DEXTROSE 50 % IN WATER 50 %
25 SYRINGE (ML) INTRAVENOUS ONCE
Refills: 0 | Status: DISCONTINUED | OUTPATIENT
Start: 2019-07-24 | End: 2019-07-27

## 2019-07-24 RX ORDER — SODIUM CHLORIDE 9 MG/ML
1000 INJECTION INTRAMUSCULAR; INTRAVENOUS; SUBCUTANEOUS ONCE
Refills: 0 | Status: COMPLETED | OUTPATIENT
Start: 2019-07-24 | End: 2019-07-24

## 2019-07-24 RX ORDER — PIPERACILLIN AND TAZOBACTAM 4; .5 G/20ML; G/20ML
3.38 INJECTION, POWDER, LYOPHILIZED, FOR SOLUTION INTRAVENOUS ONCE
Refills: 0 | Status: COMPLETED | OUTPATIENT
Start: 2019-07-24 | End: 2019-07-24

## 2019-07-24 RX ORDER — CEFTRIAXONE 500 MG/1
1000 INJECTION, POWDER, FOR SOLUTION INTRAMUSCULAR; INTRAVENOUS ONCE
Refills: 0 | Status: COMPLETED | OUTPATIENT
Start: 2019-07-24 | End: 2019-07-24

## 2019-07-24 RX ORDER — VANCOMYCIN HCL 1 G
1000 VIAL (EA) INTRAVENOUS EVERY 12 HOURS
Refills: 0 | Status: DISCONTINUED | OUTPATIENT
Start: 2019-07-25 | End: 2019-07-25

## 2019-07-24 RX ORDER — GLUCAGON INJECTION, SOLUTION 0.5 MG/.1ML
1 INJECTION, SOLUTION SUBCUTANEOUS ONCE
Refills: 0 | Status: DISCONTINUED | OUTPATIENT
Start: 2019-07-24 | End: 2019-07-27

## 2019-07-24 RX ORDER — INSULIN GLARGINE 100 [IU]/ML
24 INJECTION, SOLUTION SUBCUTANEOUS AT BEDTIME
Refills: 0 | Status: DISCONTINUED | OUTPATIENT
Start: 2019-07-24 | End: 2019-07-25

## 2019-07-24 RX ORDER — VANCOMYCIN HCL 1 G
1000 VIAL (EA) INTRAVENOUS ONCE
Refills: 0 | Status: COMPLETED | OUTPATIENT
Start: 2019-07-24 | End: 2019-07-24

## 2019-07-24 RX ORDER — ENOXAPARIN SODIUM 100 MG/ML
40 INJECTION SUBCUTANEOUS EVERY 24 HOURS
Refills: 0 | Status: DISCONTINUED | OUTPATIENT
Start: 2019-07-25 | End: 2019-07-27

## 2019-07-24 RX ORDER — ACETAMINOPHEN 500 MG
650 TABLET ORAL EVERY 6 HOURS
Refills: 0 | Status: DISCONTINUED | OUTPATIENT
Start: 2019-07-24 | End: 2019-07-27

## 2019-07-24 RX ORDER — ACETAMINOPHEN 500 MG
650 TABLET ORAL ONCE
Refills: 0 | Status: COMPLETED | OUTPATIENT
Start: 2019-07-24 | End: 2019-07-24

## 2019-07-24 RX ORDER — PIPERACILLIN AND TAZOBACTAM 4; .5 G/20ML; G/20ML
3.38 INJECTION, POWDER, LYOPHILIZED, FOR SOLUTION INTRAVENOUS EVERY 6 HOURS
Refills: 0 | Status: DISCONTINUED | OUTPATIENT
Start: 2019-07-24 | End: 2019-07-25

## 2019-07-24 RX ORDER — DEXTROSE 50 % IN WATER 50 %
12.5 SYRINGE (ML) INTRAVENOUS ONCE
Refills: 0 | Status: DISCONTINUED | OUTPATIENT
Start: 2019-07-24 | End: 2019-07-27

## 2019-07-24 RX ORDER — CEFTRIAXONE 500 MG/1
1000 INJECTION, POWDER, FOR SOLUTION INTRAMUSCULAR; INTRAVENOUS ONCE
Refills: 0 | Status: DISCONTINUED | OUTPATIENT
Start: 2019-07-24 | End: 2019-07-24

## 2019-07-24 RX ORDER — INSULIN HUMAN 100 [IU]/ML
7 INJECTION, SOLUTION SUBCUTANEOUS
Qty: 100 | Refills: 0 | Status: DISCONTINUED | OUTPATIENT
Start: 2019-07-24 | End: 2019-07-24

## 2019-07-24 RX ORDER — INSULIN LISPRO 100/ML
VIAL (ML) SUBCUTANEOUS
Refills: 0 | Status: DISCONTINUED | OUTPATIENT
Start: 2019-07-24 | End: 2019-07-27

## 2019-07-24 RX ORDER — DEXTROSE 50 % IN WATER 50 %
15 SYRINGE (ML) INTRAVENOUS ONCE
Refills: 0 | Status: DISCONTINUED | OUTPATIENT
Start: 2019-07-24 | End: 2019-07-27

## 2019-07-24 RX ADMIN — AZITHROMYCIN 255 MILLIGRAM(S): 500 TABLET, FILM COATED ORAL at 16:30

## 2019-07-24 RX ADMIN — CEFTRIAXONE 100 MILLIGRAM(S): 500 INJECTION, POWDER, FOR SOLUTION INTRAMUSCULAR; INTRAVENOUS at 15:27

## 2019-07-24 RX ADMIN — Medication 4: at 22:54

## 2019-07-24 RX ADMIN — Medication 650 MILLIGRAM(S): at 15:46

## 2019-07-24 RX ADMIN — SODIUM CHLORIDE 3000 MILLILITER(S): 9 INJECTION INTRAMUSCULAR; INTRAVENOUS; SUBCUTANEOUS at 19:32

## 2019-07-24 RX ADMIN — CEFTRIAXONE 1000 MILLIGRAM(S): 500 INJECTION, POWDER, FOR SOLUTION INTRAMUSCULAR; INTRAVENOUS at 16:29

## 2019-07-24 RX ADMIN — INSULIN GLARGINE 24 UNIT(S): 100 INJECTION, SOLUTION SUBCUTANEOUS at 22:54

## 2019-07-24 RX ADMIN — Medication 250 MILLIGRAM(S): at 20:24

## 2019-07-24 RX ADMIN — SODIUM CHLORIDE 4000 MILLILITER(S): 9 INJECTION INTRAMUSCULAR; INTRAVENOUS; SUBCUTANEOUS at 15:17

## 2019-07-24 RX ADMIN — SODIUM CHLORIDE 100 MILLILITER(S): 9 INJECTION INTRAMUSCULAR; INTRAVENOUS; SUBCUTANEOUS at 22:55

## 2019-07-24 RX ADMIN — PIPERACILLIN AND TAZOBACTAM 200 GRAM(S): 4; .5 INJECTION, POWDER, LYOPHILIZED, FOR SOLUTION INTRAVENOUS at 23:01

## 2019-07-24 NOTE — ED PROVIDER NOTE - SKIN, MLM
Indurated, crusted area noted to right upper arm with surrounding cellulitis and TTP, no drainable area palpated

## 2019-07-24 NOTE — H&P ADULT - PROBLEM SELECTOR PLAN 3
Presented with AG 22 then 15 s/p fluid resuscitation which as resolved. BHB 4.1 and ketones in urine. Known IDDM2. Reports not taking insulin for 1 day likely 2/2 to sepsis in setting of cellulitis. Therefore element of starvation ketosis possible. Less likely DKA/HHS.   -MISS  -lantus 24 (60% of home lantus 40 at bedtime) Addendum:  in setting of hyperglycemia, decreased PO intake, possible early DKA that has resolved; s/p fluid boluses, on IVFs overnight

## 2019-07-24 NOTE — ED PROVIDER NOTE - INTERPRETATION
sinus tachycardia, inferior infarct, age undtermined sinus tachycardia, inferior infarct, age undetermined

## 2019-07-24 NOTE — H&P ADULT - PROBLEM SELECTOR PLAN 7
1) PCP Contacted on Admission: (Y/N) --> Name & Phone #:  2) Date of Contact with PCP:Jeanie Ness MD Internal Medicine  [phone]  3) PCP Contacted at Discharge: (Y/N)  4) Summary of Handoff Given to PCP:   5) Post-Discharge Appointment Date and Location: Fluids: NS @ 100cc for 10 hours   Electrolytes: replete as necessary, K>4, Mg>2  Nutrition: consistent carb, DASH/TLC  Bowel Regimen: none  DVT ppx: lovenox, (improve 1)  Code: Full  Disposition: Home Reports BP meds at home but cannot provide further information  -med rec in AM

## 2019-07-24 NOTE — H&P ADULT - ATTENDING COMMENTS
patient seen and examined; Mandarin  [id number] used   reviewed pertinent data, h&p  pe as above, except pt w/ fluctuant and indurated lesion at R upper arm, suspect abscess    1. sepsis/ right upper arm  abscess and cellulitis in setting of insect bite: on vancomycin and zosyn, surgery consult for I&D, follouwp ctxs, monitor for worsening sxs, IVFs overnight monitor for resolution of AG Met acidosis   2. DM w/ hyperglycemia: suspect element of mild DKA that resolved. c/w lantus (reduced dose) and ISS, followup A1c    rest of plan as above

## 2019-07-24 NOTE — H&P ADULT - NSICDXPASTMEDICALHX_GEN_ALL_CORE_FT
PAST MEDICAL HISTORY:  HTN (hypertension)     Type 2 diabetes mellitus Diagnosed roughly 10 years ago and on insulin since

## 2019-07-24 NOTE — H&P ADULT - PROBLEM SELECTOR PLAN 8
1) PCP Contacted on Admission: (Y/N) --> Name & Phone #:  2) Date of Contact with PCP:Jeanie Ness MD Internal Medicine  [phone]  3) PCP Contacted at Discharge: (Y/N)  4) Summary of Handoff Given to PCP:   5) Post-Discharge Appointment Date and Location: Fluids: NS @ 100cc for 10 hours   Electrolytes: replete as necessary, K>4, Mg>2  Nutrition: consistent carb, DASH/TLC  Bowel Regimen: none  DVT ppx: lovenox, (improve 1)  Code: Full  Disposition: Home

## 2019-07-24 NOTE — H&P ADULT - ASSESSMENT
73 yo Mandarin Chinese speaking male, PMHx IDDM2 and HTN, presents with hyperglycemia and sepsis 2/2 right arm cellulitis

## 2019-07-24 NOTE — ED PROVIDER NOTE - OBJECTIVE STATEMENT
75 yo M with PMH of insulin dependent diabetes and HTN, Mandarin speaking, staff RN used for  as pt is not a good candidate for  line, p/w not feeling well. Contrary to triage pt has not had any alcohol today and drinks 1-2 beer 1-2 times per week. Pt is a very poor historian and is not providing specifics. Admits to dry cough X 2 weeks and ?dysuria. Pt appears to have urinated on himself. 75 yo M with PMH of insulin dependent diabetes and HTN, Mandarin speaking, very poor historian, staff RN used for translation as pt is not a good candidate for  line, p/w not feeling well. Contrary to triage pt has not had any alcohol today and drinks 1-2 beers, 1-2 times per week. Pt is a very poor historian and is not providing specifics. Admits to dry cough X 2 weeks and ?dysuria. Pt appears to have urinated on himself. Also noted to have some induration with cellulitis on his right upper arm. Pt states that he was bitten by an insect a few days ago and it has been draining fluid for the past 2-3 days. Denies fevers, CP, SOB, abd pain, flank pain, HA, neck stiffness. No other complaints. 75 yo M with PMH of insulin dependent diabetes and HTN, Mandarin speaking, very poor historian, staff RN used for translation as pt is not a good candidate for  line, p/w not feeling well. Contrary to triage pt has not had any alcohol today and drinks 1-2 beers, 1-2 times per week. Pt is a very poor historian and is not providing specifics. Admits to dry cough X 2 weeks and ?dysuria. Pt appears to have urinated on himself. Has not taken insulin in 2 days. Also noted to have some induration with cellulitis on his right upper arm. Pt states that he was bitten by an insect a few days ago and it has been draining fluid for the past 2-3 days. Denies fevers, CP, SOB, abd pain, flank pain, HA, neck stiffness. No other complaints.

## 2019-07-24 NOTE — ED PROVIDER NOTE - PROGRESS NOTE DETAILS
Pt febrile in ED. Sepsis w/up initiated and pt covered with abx for presumed pna and cellulitis. IVF given. Labs noted and pt with anion gap and elevated glucose. ph normal on VBG. Anion gap closed after 2 liters of NS and repeat glucose is 279. Do not suspect DKA. Admitted to medicine. Pt febrile in ED. Sepsis w/up initiated and pt covered with abx for presumed pna and cellulitis. IVF given. Labs noted and pt with elevated anion gap and elevated glucose. ph normal on VBG. Anion gap closed after 2 liters of NS and repeat glucose is 279. Do not suspect DKA. Admitted to medicine.

## 2019-07-24 NOTE — H&P ADULT - PROBLEM SELECTOR PLAN 6
Fluids: NS @ 100cc for 10 hours   Electrolytes: replete as necessary, K>4, Mg>2  Nutrition: consistent carb, DASH/TLC  Bowel Regimen: none  DVT ppx: lovenox  Code: Full  Disposition: Home Fluids: NS @ 100cc for 10 hours   Electrolytes: replete as necessary, K>4, Mg>2  Nutrition: consistent carb, DASH/TLC  Bowel Regimen: none  DVT ppx: lovenox, (improve 1)  Code: Full  Disposition: Home Presented with ->[id number] likely 2/2 due to not taking insulin for 1 day  Management as above resulting in abscess, I&D pending

## 2019-07-24 NOTE — ED PROVIDER NOTE - CLINICAL SUMMARY MEDICAL DECISION MAKING FREE TEXT BOX
75 yo M with PMH of insulin dependent diabetes and HTN, Mandarin speaking, very poor historian, staff RN used for translation as pt is not a good candidate for  line, p/w not feeling well. Contrary to triage pt has not had any alcohol today and drinks 1-2 beers, 1-2 times per week. Pt is a very poor historian and is not providing specifics. Admits to dry cough X 2 weeks and ?dysuria. Pt appears to have urinated on himself. Also noted to have some induration with cellulitis on his right upper arm. Pt states that he was bitten by an insect a few days ago and it has been draining fluid for the past 2-3 days. Denies fevers, CP, SOB, abd pain, flank pain, HA, neck stiffness. No other complaints. Indurated, crusted area noted to right upper arm with surrounding cellulitis and TTP, no drainable area palpated 73 yo M with PMH of insulin dependent diabetes and HTN, Mandarin speaking, very poor historian, staff RN used for translation as pt is not a good candidate for  line, p/w not feeling well. Contrary to triage pt has not had any alcohol today and drinks 1-2 beers, 1-2 times per week. Pt is a very poor historian and is not providing specifics. Admits to dry cough X 2 weeks and ?dysuria. Pt appears to have urinated on himself. Also noted to have some induration with cellulitis on his right upper arm. Pt states that he was bitten by an insect a few days ago and it has been draining fluid for the past 2-3 days. Denies fevers, CP, SOB, abd pain, flank pain, HA, neck stiffness.  Indurated, crusted area noted to right upper arm with surrounding cellulitis and TTP, no drainable area palpated. Pt febrile in ED. Sepsis w/up initiated and pt covered with abx for presumed pna and cellulitis. IVF given. Labs noted and pt with anion gap and elevated glucose. ph normal on VBG. Anion gap closed after 2 liters of NS and repeat glucose is 279. Do not suspect DKA. Admitted to medicine. 73 yo M with PMH of insulin dependent diabetes and HTN, Mandarin speaking, very poor historian, staff RN used for translation as pt is not a good candidate for  line, p/w not feeling well. Contrary to triage pt has not had any alcohol today and drinks 1-2 beers, 1-2 times per week. Pt is a very poor historian and is not providing specifics. Admits to dry cough X 2 weeks and ?dysuria. Pt appears to have urinated on himself. Also noted to have some induration with cellulitis on his right upper arm. Pt states that he was bitten by an insect a few days ago and it has been draining fluid for the past 2-3 days. Denies fevers, CP, SOB, abd pain, flank pain, HA, neck stiffness.  Indurated, crusted area noted to right upper arm with surrounding cellulitis and TTP, no drainable area palpated. Pt febrile in ED. Sepsis w/up initiated and pt covered with abx for presumed pna and cellulitis. CXR with ?right sided infiltrate, IVF given. Labs noted and pt with elevated anion gap and elevated glucose. ph normal on VBG. Anion gap closed after 2 liters of NS and repeat glucose is 279. Do not suspect DKA. Suspect dehydration as course for the elevated gap. Admitted to medicine.

## 2019-07-24 NOTE — H&P ADULT - PROBLEM SELECTOR PLAN 4
Reports BP meds at home but cannot provide further information  -med rec in AM Presented with AG 22 then 15 s/p fluid resuscitation which as resolved. BHB 4.1 and ketones in urine. Known IDDM2. Reports not taking insulin for 1 day likely 2/2 to sepsis in setting of cellulitis. Therefore element of starvation ketosis possible. Less likely DKA/HHS.   -MISS  -lantus 24 (60% of home lantus 40 at bedtime)

## 2019-07-24 NOTE — H&P ADULT - HISTORY OF PRESENT ILLNESS
75 yo Mandarin Chinese speaking male, PMHx IDDM2 and HTN, presented to ED feeling unwell for the past 1 week. He reports that a couple of days prior he was bitten by an insect in his upper arm, then in the subsequent 2-3 days felt subjective fevers and chills at home. However today he felt extremely unwell and did not take his insulin yesterday. He is usually complaint daily on his insulin as per family and patient. Additionally endorses some drainage from the insect bite site. He presented to [facility] ED because he felt he was getting worse, associated with fatigue then had to urge to urinate on himself. Denies any cough, recent travel, sick contacts, chest pain, dyspnea, headache, hallucinations, dyspnea, leg swelling. Contrary to previous ED note, pt reports not having beers in the past 3 days.   ED: T 101.2,  /93 RR 16 SpO2 98%.  Labs significant for wbc 11.54, neutrophil %: 86.3, calculated AG 22 -> 15 s/p 3L NS bolus, Bhydroxybutyrate: 4.1, blood sugar: 419 -> 276, UA positive for ketones and glucose 500.  Given: 650mg tylenol, azithromycin 500mg IV, vancomycin 1gm, ceftriaxone 1g. 73 yo Mandarin Chinese speaking male, PMHx IDDM2 and HTN, presented to ED feeling unwell for the past 1 week. He reports that a couple of days prior he was bitten by an insect in his upper arm, then in the subsequent 2-3 days felt subjective fevers and chills at home. However today he felt extremely unwell and did not take his insulin yesterday. He is usually complaint daily on his insulin as per family and patient. Additionally endorses some drainage from the insect bite site. He presented to [facility] ED because he felt he was getting worse, associated with fatigue and decreased appetite in past 1 day then felt urge to urinate on himself. Denies any cough, recent travel, sick contacts, chest pain, dyspnea, headache, hallucinations, dyspnea, leg swelling. Contrary to previous ED note, pt reports not having beers in the past 3 days.   ED: T 101.2,  /93 RR 16 SpO2 98%.  Labs significant for wbc 11.54, neutrophil %: 86.3, calculated AG 22 -> 15 s/p 3L NS bolus, Bhydroxybutyrate: 4.1, blood sugar: 419 -> 276, UA positive for ketones and glucose 500.  Given: 650mg tylenol, azithromycin 500mg IV, vancomycin 1gm, ceftriaxone 1g.

## 2019-07-24 NOTE — ED ADULT TRIAGE NOTE - CHIEF COMPLAINT QUOTE
Patient states, "I had beers."  Patient noted to have urinated on himself.  Patient denies N/V, fevers, chills or any other complaints at this time.  , PMHx DM.

## 2019-07-24 NOTE — H&P ADULT - NSHPSOCIALHISTORY_GEN_ALL_CORE
Never smoker, no herbal/Chinese medications, social beer drinker ~2-3 beers every 3-4 days. . Unemployed and takes care of grandchild/

## 2019-07-24 NOTE — H&P ADULT - PROBLEM SELECTOR PLAN 9
1) PCP Contacted on Admission: (Y/N) --> Name & Phone #:  2) Date of Contact with PCP:Jeanie Ness MD Internal Medicine  [phone]  3) PCP Contacted at Discharge: (Y/N)  4) Summary of Handoff Given to PCP:   5) Post-Discharge Appointment Date and Location:

## 2019-07-24 NOTE — H&P ADULT - NSHPOUTPATIENTPROVIDERS_GEN_ALL_CORE
Jeanie Ness MD Internal Medicine  [phone]  Cell: [phone]  [phone] [phone]

## 2019-07-24 NOTE — H&P ADULT - NSHPPHYSICALEXAM_GEN_ALL_CORE
PHYSICAL EXAM:    Constitutional: Chinese male, NAD, comfortable but tired, cooperative   HEENT: PERRL, EOMI, sclera non-icteric, neck supple, trachea midline, no JVD, MMM  Respiratory: CTAB, no wheezing, no w/r/r, no  egophony   Cardiovascular: RRR, normal S1S2, no M/R/G  Gastrointestinal: soft, NTND, no masses palpable, normoactive  Extremities: Warm, well perfused, 2+ pulses equal bilateral upper and lower extremities, no edema, no clubbing  Right Arm: 2x2cm spherical nonfluctuant, erythematous, indurated lesion with central crusting without oozing   Neurological: AAOx3, CN Grossly intact  Skin: Normal temperature, warm, dry    MEDICATIONS  (STANDING):  dextrose 5%. 1000 milliLiter(s) (50 mL/Hr) IV Continuous <Continuous>  dextrose 50% Injectable 12.5 Gram(s) IV Push once  dextrose 50% Injectable 25 Gram(s) IV Push once  dextrose 50% Injectable 25 Gram(s) IV Push once  insulin glargine Injectable (LANTUS) 24 Unit(s) SubCutaneous at bedtime  insulin lispro (HumaLOG) corrective regimen sliding scale   SubCutaneous Before meals and at bedtime  piperacillin/tazobactam IVPB. 3.375 Gram(s) IV Intermittent once  piperacillin/tazobactam IVPB.. 3.375 Gram(s) IV Intermittent every 6 hours  sodium chloride 0.9%. 1000 milliLiter(s) (100 mL/Hr) IV Continuous <Continuous> PHYSICAL EXAM:    Constitutional: Chinese male, NAD, comfortable but tired, cooperative   HEENT: PERRL, EOMI, sclera non-icteric, neck supple, trachea midline, no JVD, MMM  Respiratory: CTAB, no wheezing, no w/r/r, no  egophony   Cardiovascular: RRR, normal S1S2, no M/R/G  Gastrointestinal: soft, NTND, no masses palpable, normoactive  Extremities: Warm, well perfused, 2+ pulses equal bilateral upper and lower extremities, no edema, no clubbing  Right Arm: 2x2cm spherical nonfluctuant, erythematous, indurated lesion with central crusting without oozing, no lymphadenopathy  Neurological: AAOx3, CN Grossly intact  Skin: Normal temperature, warm, dry    MEDICATIONS  (STANDING):  dextrose 5%. 1000 milliLiter(s) (50 mL/Hr) IV Continuous <Continuous>  dextrose 50% Injectable 12.5 Gram(s) IV Push once  dextrose 50% Injectable 25 Gram(s) IV Push once  dextrose 50% Injectable 25 Gram(s) IV Push once  insulin glargine Injectable (LANTUS) 24 Unit(s) SubCutaneous at bedtime  insulin lispro (HumaLOG) corrective regimen sliding scale   SubCutaneous Before meals and at bedtime  piperacillin/tazobactam IVPB. 3.375 Gram(s) IV Intermittent once  piperacillin/tazobactam IVPB.. 3.375 Gram(s) IV Intermittent every 6 hours  sodium chloride 0.9%. 1000 milliLiter(s) (100 mL/Hr) IV Continuous <Continuous>

## 2019-07-24 NOTE — ED PROVIDER NOTE - CPE EDP MUSC NORM
Returned call to [employer], speech pathologist from [facility]. Given verbal to continue with speech therapy. Instructed to contact the office with any further questions/concerns she may have.     Per Epic review:    Associated DX:  Dysphagia, unsp normal...

## 2019-07-24 NOTE — ED PROVIDER NOTE - CARE PLAN
Principal Discharge DX:	Fever  Secondary Diagnosis:	Hyperglycemia Principal Discharge DX:	Fever  Secondary Diagnosis:	Hyperglycemia  Secondary Diagnosis:	Cellulitis

## 2019-07-24 NOTE — H&P ADULT - NSICDXFAMILYHX_GEN_ALL_CORE_FT
No pertinent family history in first degree relatives FAMILY HISTORY:  FH: diabetes mellitus, mother and father

## 2019-07-24 NOTE — H&P ADULT - NSHPLABSRESULTS_GEN_ALL_CORE
LABS:                        15.1   11.54 )-----------( 106      ( 2019 15:12 )             45.7         136  |  104  |  12  ----------------------------<  279<H>  4.2   |  17<L>  |  0.65    Ca    7.6<L>      2019 18:13    TPro  8.0  /  Alb  4.3  /  TBili  0.8  /  DBili  x   /  AST  19  /  ALT  16  /  AlkPhos  102      PT/INR - ( 2019 15:12 )   PT: 10.2 sec;   INR: 0.91          PTT - ( 2019 15:12 )  PTT:28.0 sec  Urinalysis Basic - ( 2019 18:15 )    Color: Yellow / Appearance: Clear / S.020 / pH: x  Gluc: x / Ketone: >=80 mg/dL  / Bili: Negative / Urobili: 0.2 E.U./dL   Blood: x / Protein: NEGATIVE mg/dL / Nitrite: NEGATIVE   Leuk Esterase: NEGATIVE / RBC: x / WBC x   Sq Epi: x / Non Sq Epi: x / Bacteria: x      CAPILLARY BLOOD GLUCOSE      POCT Blood Glucose.: 241 mg/dL (2019 22:04)  POCT Blood Glucose.: 276 mg/dL (2019 18:38)  POCT Blood Glucose.: 419 mg/dL (2019 14:02) LABS:                        15.1   11.54 )-----------( 106      ( 2019 15:12 )             45.7         136  |  104  |  12  ----------------------------<  279<H>  4.2   |  17<L>  |  0.65    Ca    7.6<L>      2019 18:13    TPro  8.0  /  Alb  4.3  /  TBili  0.8  /  DBili  x   /  AST  19  /  ALT  16  /  AlkPhos  102      PT/INR - ( 2019 15:12 )   PT: 10.2 sec;   INR: 0.91          PTT - ( 2019 15:12 )  PTT:28.0 sec  Urinalysis Basic - ( 2019 18:15 )    Color: Yellow / Appearance: Clear / S.020 / pH: x  Gluc: x / Ketone: >=80 mg/dL  / Bili: Negative / Urobili: 0.2 E.U./dL   Blood: x / Protein: NEGATIVE mg/dL / Nitrite: NEGATIVE   Leuk Esterase: NEGATIVE / RBC: x / WBC x   Sq Epi: x / Non Sq Epi: x / Bacteria: x      CAPILLARY BLOOD GLUCOSE      POCT Blood Glucose.: 241 mg/dL (2019 22:04)  POCT Blood Glucose.: 276 mg/dL (2019 18:38)  POCT Blood Glucose.: 419 mg/dL (2019 14:02)    EKG: NSR, LVH (daniela criteria x>24mm male R avL + S V3) LABS:                        15.1   11.54 )-----------( 106      ( 2019 15:12 )             45.7         136  |  104  |  12  ----------------------------<  279<H>  4.2   |  17<L>  |  0.65    Ca    7.6<L>      2019 18:13    TPro  8.0  /  Alb  4.3  /  TBili  0.8  /  DBili  x   /  AST  19  /  ALT  16  /  AlkPhos  102      PT/INR - ( 2019 15:12 )   PT: 10.2 sec;   INR: 0.91          PTT - ( 2019 15:12 )  PTT:28.0 sec  Urinalysis Basic - ( 2019 18:15 )    Color: Yellow / Appearance: Clear / S.020 / pH: x  Gluc: x / Ketone: >=80 mg/dL  / Bili: Negative / Urobili: 0.2 E.U./dL   Blood: x / Protein: NEGATIVE mg/dL / Nitrite: NEGATIVE   Leuk Esterase: NEGATIVE / RBC: x / WBC x   Sq Epi: x / Non Sq Epi: x / Bacteria: x      CAPILLARY BLOOD GLUCOSE      POCT Blood Glucose.: 241 mg/dL (2019 22:04)  POCT Blood Glucose.: 276 mg/dL (2019 18:38)  POCT Blood Glucose.: 419 mg/dL (2019 14:02)    EKG: NSR

## 2019-07-24 NOTE — H&P ADULT - PROBLEM SELECTOR PROBLEM 6
Nutrition, metabolism, and development symptoms Hyperglycemia Insect bite of right upper arm, initial encounter

## 2019-07-24 NOTE — H&P ADULT - PROBLEM SELECTOR PLAN 1
Presented with T101.2,  with high suspicion of right arm cellulitis as source. Lactate 1.9 (normal), wbc 11.54, and neutrophil % 86.3 suspicious for bacterial infection. History of insect bite as per patient. Less likely necrotizing fasciitis, compartment syndrome, or erysipelas. However as pt presented septic, will treat with empirically with broad spectrum antibiotics.   Received azithromycin 500mg IV, ceftriaxone 1g and vancomycin 1g in ED. CXR unconfirmed RLL with infiltrate, but vanz/zosyn will broadly cover.   -c/w vancomycin and zosyn   -f/u blood cultures  -tylenol PRN for fevers   -follow fever curve   -fluid resuscitation complete  -will deescalate antibiotics as needed Presented with T101.2,  with high suspicion of right arm cellulitis as source. Lactate 1.9 (normal), wbc 11.54, and neutrophil % 86.3 suspicious for bacterial infection. History of insect bite as per patient. Less likely necrotizing fasciitis, compartment syndrome, or erysipelas. However as pt presented septic, will treat with empirically with broad spectrum antibiotics.   Received azithromycin 500mg IV, ceftriaxone 1g and vancomycin 1g in ED. CXR unconfirmed with possible RLL with infiltrate, but vanz/zosyn will broadly cover. Less inclined to add azithromycin for atypical coverage as pt without cough or symptoms of URTI and negative egophony.   -c/w vancomycin and zosyn   -f/u blood cultures  -tylenol PRN for fevers   -follow fever curve   -fluid resuscitation complete  -will deescalate antibiotics as needed

## 2019-07-24 NOTE — ED ADULT NURSE NOTE - NSIMPLEMENTINTERV_GEN_ALL_ED
Implemented All Fall Risk Interventions:  [place] to call system. Call bell, personal items and telephone within reach. Instruct patient to call for assistance. Room bathroom lighting operational. Non-slip footwear when patient is off stretcher. Physically safe environment: no spills, clutter or unnecessary equipment. Stretcher in lowest position, wheels locked, appropriate side rails in place. Provide visual cue, wrist band, yellow gown, etc. Monitor gait and stability. Monitor for mental status changes and reorient to person, place, and time. Review medications for side effects contributing to fall risk. Reinforce activity limits and safety measures with patient and family.

## 2019-07-25 DIAGNOSIS — E87.2 ACIDOSIS: ICD-10-CM

## 2019-07-25 DIAGNOSIS — L03.119 CELLULITIS OF UNSPECIFIED PART OF LIMB: ICD-10-CM

## 2019-07-25 DIAGNOSIS — S40.861A INSECT BITE (NONVENOMOUS) OF RIGHT UPPER ARM, INITIAL ENCOUNTER: ICD-10-CM

## 2019-07-25 DIAGNOSIS — E11.9 TYPE 2 DIABETES MELLITUS WITHOUT COMPLICATIONS: ICD-10-CM

## 2019-07-25 LAB
ALBUMIN SERPL ELPH-MCNC: 3.1 G/DL — LOW (ref 3.3–5)
ALP SERPL-CCNC: 67 U/L — SIGNIFICANT CHANGE UP (ref 40–120)
ALT FLD-CCNC: 12 U/L — SIGNIFICANT CHANGE UP (ref 10–45)
ANION GAP SERPL CALC-SCNC: 11 MMOL/L — SIGNIFICANT CHANGE UP (ref 5–17)
AST SERPL-CCNC: 9 U/L — LOW (ref 10–40)
BASOPHILS # BLD AUTO: 0.01 K/UL — SIGNIFICANT CHANGE UP (ref 0–0.2)
BASOPHILS NFR BLD AUTO: 0.1 % — SIGNIFICANT CHANGE UP (ref 0–2)
BILIRUB SERPL-MCNC: 0.8 MG/DL — SIGNIFICANT CHANGE UP (ref 0.2–1.2)
BUN SERPL-MCNC: 9 MG/DL — SIGNIFICANT CHANGE UP (ref 7–23)
CALCIUM SERPL-MCNC: 7.9 MG/DL — LOW (ref 8.4–10.5)
CHLORIDE SERPL-SCNC: 107 MMOL/L — SIGNIFICANT CHANGE UP (ref 96–108)
CO2 SERPL-SCNC: 18 MMOL/L — LOW (ref 22–31)
CREAT SERPL-MCNC: 0.51 MG/DL — SIGNIFICANT CHANGE UP (ref 0.5–1.3)
EOSINOPHIL # BLD AUTO: 0.05 K/UL — SIGNIFICANT CHANGE UP (ref 0–0.5)
EOSINOPHIL NFR BLD AUTO: 0.6 % — SIGNIFICANT CHANGE UP (ref 0–6)
GLUCOSE BLDC GLUCOMTR-MCNC: 190 MG/DL — HIGH (ref 70–99)
GLUCOSE BLDC GLUCOMTR-MCNC: 222 MG/DL — HIGH (ref 70–99)
GLUCOSE BLDC GLUCOMTR-MCNC: 290 MG/DL — HIGH (ref 70–99)
GLUCOSE BLDC GLUCOMTR-MCNC: 305 MG/DL — HIGH (ref 70–99)
GLUCOSE BLDC GLUCOMTR-MCNC: 414 MG/DL — HIGH (ref 70–99)
GLUCOSE SERPL-MCNC: 209 MG/DL — HIGH (ref 70–99)
HBA1C BLD-MCNC: 11.4 % — HIGH (ref 4–5.6)
HCT VFR BLD CALC: 40.5 % — SIGNIFICANT CHANGE UP (ref 39–50)
HCV AB S/CO SERPL IA: 0.07 S/CO — SIGNIFICANT CHANGE UP
HCV AB SERPL-IMP: SIGNIFICANT CHANGE UP
HGB BLD-MCNC: 13.5 G/DL — SIGNIFICANT CHANGE UP (ref 13–17)
IMM GRANULOCYTES NFR BLD AUTO: 0.7 % — SIGNIFICANT CHANGE UP (ref 0–1.5)
LYMPHOCYTES # BLD AUTO: 0.66 K/UL — LOW (ref 1–3.3)
LYMPHOCYTES # BLD AUTO: 7.5 % — LOW (ref 13–44)
MCHC RBC-ENTMCNC: 30.5 PG — SIGNIFICANT CHANGE UP (ref 27–34)
MCHC RBC-ENTMCNC: 33.3 GM/DL — SIGNIFICANT CHANGE UP (ref 32–36)
MCV RBC AUTO: 91.4 FL — SIGNIFICANT CHANGE UP (ref 80–100)
MONOCYTES # BLD AUTO: 0.95 K/UL — HIGH (ref 0–0.9)
MONOCYTES NFR BLD AUTO: 10.8 % — SIGNIFICANT CHANGE UP (ref 2–14)
NEUTROPHILS # BLD AUTO: 7.07 K/UL — SIGNIFICANT CHANGE UP (ref 1.8–7.4)
NEUTROPHILS NFR BLD AUTO: 80.3 % — HIGH (ref 43–77)
NRBC # BLD: 0 /100 WBCS — SIGNIFICANT CHANGE UP (ref 0–0)
PLATELET # BLD AUTO: 95 K/UL — LOW (ref 150–400)
POTASSIUM SERPL-MCNC: 3.5 MMOL/L — SIGNIFICANT CHANGE UP (ref 3.5–5.3)
POTASSIUM SERPL-SCNC: 3.5 MMOL/L — SIGNIFICANT CHANGE UP (ref 3.5–5.3)
PROT SERPL-MCNC: 6.1 G/DL — SIGNIFICANT CHANGE UP (ref 6–8.3)
RBC # BLD: 4.43 M/UL — SIGNIFICANT CHANGE UP (ref 4.2–5.8)
RBC # FLD: 11.9 % — SIGNIFICANT CHANGE UP (ref 10.3–14.5)
SODIUM SERPL-SCNC: 136 MMOL/L — SIGNIFICANT CHANGE UP (ref 135–145)
WBC # BLD: 8.8 K/UL — SIGNIFICANT CHANGE UP (ref 3.8–10.5)
WBC # FLD AUTO: 8.8 K/UL — SIGNIFICANT CHANGE UP (ref 3.8–10.5)

## 2019-07-25 PROCEDURE — 73060 X-RAY EXAM OF HUMERUS: CPT | Mod: 26,RT

## 2019-07-25 PROCEDURE — 99233 SBSQ HOSP IP/OBS HIGH 50: CPT | Mod: GC

## 2019-07-25 RX ORDER — LOSARTAN POTASSIUM 100 MG/1
50 TABLET, FILM COATED ORAL DAILY
Refills: 0 | Status: DISCONTINUED | OUTPATIENT
Start: 2019-07-25 | End: 2019-07-27

## 2019-07-25 RX ORDER — IPRATROPIUM/ALBUTEROL SULFATE 18-103MCG
3 AEROSOL WITH ADAPTER (GRAM) INHALATION EVERY 6 HOURS
Refills: 0 | Status: DISCONTINUED | OUTPATIENT
Start: 2019-07-25 | End: 2019-07-27

## 2019-07-25 RX ORDER — POTASSIUM CHLORIDE 20 MEQ
40 PACKET (EA) ORAL ONCE
Refills: 0 | Status: COMPLETED | OUTPATIENT
Start: 2019-07-25 | End: 2019-07-25

## 2019-07-25 RX ORDER — DOXAZOSIN MESYLATE 4 MG
4 TABLET ORAL AT BEDTIME
Refills: 0 | Status: DISCONTINUED | OUTPATIENT
Start: 2019-07-25 | End: 2019-07-27

## 2019-07-25 RX ORDER — INSULIN LISPRO 100/ML
4 VIAL (ML) SUBCUTANEOUS
Refills: 0 | Status: DISCONTINUED | OUTPATIENT
Start: 2019-07-25 | End: 2019-07-26

## 2019-07-25 RX ORDER — VANCOMYCIN HCL 1 G
1000 VIAL (EA) INTRAVENOUS EVERY 12 HOURS
Refills: 0 | Status: DISCONTINUED | OUTPATIENT
Start: 2019-07-25 | End: 2019-07-26

## 2019-07-25 RX ORDER — INSULIN GLARGINE 100 [IU]/ML
30 INJECTION, SOLUTION SUBCUTANEOUS AT BEDTIME
Refills: 0 | Status: DISCONTINUED | OUTPATIENT
Start: 2019-07-25 | End: 2019-07-27

## 2019-07-25 RX ADMIN — LOSARTAN POTASSIUM 50 MILLIGRAM(S): 100 TABLET, FILM COATED ORAL at 16:50

## 2019-07-25 RX ADMIN — Medication 2: at 08:42

## 2019-07-25 RX ADMIN — ENOXAPARIN SODIUM 40 MILLIGRAM(S): 100 INJECTION SUBCUTANEOUS at 12:21

## 2019-07-25 RX ADMIN — Medication 40 MILLIEQUIVALENT(S): at 10:21

## 2019-07-25 RX ADMIN — Medication 6: at 12:21

## 2019-07-25 RX ADMIN — Medication 4 MILLIGRAM(S): at 21:45

## 2019-07-25 RX ADMIN — Medication 12: at 21:57

## 2019-07-25 RX ADMIN — INSULIN GLARGINE 30 UNIT(S): 100 INJECTION, SOLUTION SUBCUTANEOUS at 21:57

## 2019-07-25 RX ADMIN — Medication 4 UNIT(S): at 17:41

## 2019-07-25 RX ADMIN — Medication 4: at 17:41

## 2019-07-25 RX ADMIN — Medication 250 MILLIGRAM(S): at 08:42

## 2019-07-25 RX ADMIN — PIPERACILLIN AND TAZOBACTAM 200 GRAM(S): 4; .5 INJECTION, POWDER, LYOPHILIZED, FOR SOLUTION INTRAVENOUS at 06:13

## 2019-07-25 RX ADMIN — Medication 250 MILLIGRAM(S): at 21:45

## 2019-07-25 NOTE — PROGRESS NOTE ADULT - PROBLEM SELECTOR PLAN 1
cellulitis with possible abscess of right arm  -c/w vancomycin q12, f/u vanco trough  -will f/u US of right upper extremity  -f/u blood culture  -consider gen surg for I&D  -

## 2019-07-25 NOTE — PROGRESS NOTE ADULT - SUBJECTIVE AND OBJECTIVE BOX
INCOMPLETE NOTE    OVERNIGHT EVENTS: admitted overnight with initial blood sugar 419 then 276, positive urine ketones    SUBJECTIVE / INTERVAL HPI: This AM patient seen and examined at bedside. Later seen with  service. He complains of chills, and slight headache but denies fever n/v, chest pain, SOB, abdominal pain, diarrhea, constipation, melena, hematochezia, hematuria, dysuria. Patient states that he does not take his home medication for DM everyday.     VITAL SIGNS:  Vital Signs Last 24 Hrs  T(C): 36.8 (2019 08:47), Max: 38.4 (2019 15:09)  T(F): 98.3 (2019 08:47), Max: 101.2 (2019 15:09)  HR: 94 (2019 08:47) (72 - 107)  BP: 143/68 (2019 08:47) (143/68 - 186/93)  BP(mean): --  RR: 16 (2019 08:47) (16 - 18)  SpO2: 96% (2019 08:47) (96% - 99%)      19 @ 07:01  -  19 @ 07:00  --------------------------------------------------------  IN: 1000 mL / OUT: 351 mL / NET: 649 mL        PHYSICAL EXAM:  General: NAD, Laying comfortably in bed  HEENT: NC/AT, anicteric sclera, MMM  Neck: supple  Cardiovascular: +S1/S2, RRR, No murmurs, rubs, gallops  Respiratory: CTA B/L, no crackles, rhonci, or wheezing  Gastrointestinal: soft, NT/ND, +BSx4  Extremities: upper R extremity with 3-4 erythematous lesion, indurated with central crusting, no oozing or pus, no edema, clubbing or cyanosis  Vascular: 2+ radial B/L  Neurological: no focal deficits    MEDICATIONS  (STANDING):  dextrose 5%. 1000 milliLiter(s) (50 mL/Hr) IV Continuous <Continuous>  dextrose 50% Injectable 12.5 Gram(s) IV Push once  dextrose 50% Injectable 25 Gram(s) IV Push once  dextrose 50% Injectable 25 Gram(s) IV Push once  enoxaparin Injectable 40 milliGRAM(s) SubCutaneous every 24 hours  insulin glargine Injectable (LANTUS) 24 Unit(s) SubCutaneous at bedtime  insulin lispro (HumaLOG) corrective regimen sliding scale   SubCutaneous Before meals and at bedtime  sodium chloride 0.9%. 1000 milliLiter(s) (100 mL/Hr) IV Continuous <Continuous>  vancomycin  IVPB 1000 milliGRAM(s) IV Intermittent every 12 hours    MEDICATIONS  (PRN):  acetaminophen   Tablet .. 650 milliGRAM(s) Oral every 6 hours PRN Temp greater or equal to 38C (100.4F), Mild Pain (1 - 3)  dextrose 40% Gel 15 Gram(s) Oral once PRN Blood Glucose LESS THAN 70 milliGRAM(s)/deciliter  glucagon  Injectable 1 milliGRAM(s) IntraMuscular once PRN Glucose LESS THAN 70 milligrams/deciliter    Allergies    No Known Allergies    Intolerances        LABS:                        13.5   8.80  )-----------( 95       ( 2019 08:34 )             40.5     07-    136  |  107  |  9   ----------------------------<  209<H>  3.5   |  18<L>  |  0.51    Ca    7.9<L>      2019 08:34    TPro  6.1  /  Alb  3.1<L>  /  TBili  0.8  /  DBili  x   /  AST  9<L>  /  ALT  12  /  AlkPhos  67      PT/INR - ( 2019 15:12 )   PT: 10.2 sec;   INR: 0.91          PTT - ( 2019 15:12 )  PTT:28.0 sec  Urinalysis Basic - ( 2019 18:15 )    Color: Yellow / Appearance: Clear / S.020 / pH: x  Gluc: x / Ketone: >=80 mg/dL  / Bili: Negative / Urobili: 0.2 E.U./dL   Blood: x / Protein: NEGATIVE mg/dL / Nitrite: NEGATIVE   Leuk Esterase: NEGATIVE / RBC: x / WBC x   Sq Epi: x / Non Sq Epi: x / Bacteria: x      CAPILLARY BLOOD GLUCOSE      POCT Blood Glucose.: 290 mg/dL (2019 12:10)        Culture - Blood (collected 19 @ 16:24)  Source: .Blood Blood-Peripheral  Preliminary Report (19 @ 05:01):    No growth at 12 hours    Culture - Blood (collected 19 @ 16:24)  Source: .Blood Blood-Peripheral  Preliminary Report (19 @ 05:01):    No growth at 12 hours      < from: Xray Chest 1 View-PORTABLE IMMEDIATE (19 @ 16:29) >  Impression:    Mild hypoinflation. No lung infiltrate or pleural effusion. No   pneumothorax. No acute bone abnormality.    < end of copied text >    US of right extremity pending INCOMPLETE NOTE    OVERNIGHT EVENTS: admitted overnight with initial blood sugar 419 then 276, positive urine ketones    SUBJECTIVE / INTERVAL HPI: This AM patient seen and examined at bedside. Later seen with  service. He complains of chills, and slight headache but denies fever n/v, chest pain, SOB, abdominal pain, diarrhea, constipation, melena, hematochezia, hematuria, dysuria. Patient states that he does not take his home medication for DM everyday.     VITAL SIGNS:  Vital Signs Last 24 Hrs  T(C): 36.8 (2019 08:47), Max: 38.4 (2019 15:09)  T(F): 98.3 (2019 08:47), Max: 101.2 (2019 15:09)  HR: 94 (2019 08:47) (72 - 107)  BP: 143/68 (2019 08:47) (143/68 - 186/93)  BP(mean): --  RR: 16 (2019 08:47) (16 - 18)  SpO2: 96% (2019 08:47) (96% - 99%)      19 @ 07:01  -  19 @ 07:00  --------------------------------------------------------  IN: 1000 mL / OUT: 351 mL / NET: 649 mL        PHYSICAL EXAM:  General: NAD, Laying comfortably in bed  HEENT: NC/AT, anicteric sclera, MMM  Neck: supple  Cardiovascular: +S1/S2, RRR, No murmurs, rubs, gallops  Respiratory: CTA B/L, no crackles, rhonci, or wheezing  Gastrointestinal: soft, NT/ND, +BSx4  Extremities: upper R extremity with round 3-4cm erythematous lesion, indurated with central crusting, no oozing or pus, no edema, clubbing or cyanosis. right shin with some excoriations   Vascular: 2+ radial B/L  Neurological: no focal deficits    MEDICATIONS  (STANDING):  dextrose 5%. 1000 milliLiter(s) (50 mL/Hr) IV Continuous <Continuous>  dextrose 50% Injectable 12.5 Gram(s) IV Push once  dextrose 50% Injectable 25 Gram(s) IV Push once  dextrose 50% Injectable 25 Gram(s) IV Push once  enoxaparin Injectable 40 milliGRAM(s) SubCutaneous every 24 hours  insulin glargine Injectable (LANTUS) 24 Unit(s) SubCutaneous at bedtime  insulin lispro (HumaLOG) corrective regimen sliding scale   SubCutaneous Before meals and at bedtime  sodium chloride 0.9%. 1000 milliLiter(s) (100 mL/Hr) IV Continuous <Continuous>  vancomycin  IVPB 1000 milliGRAM(s) IV Intermittent every 12 hours    MEDICATIONS  (PRN):  acetaminophen   Tablet .. 650 milliGRAM(s) Oral every 6 hours PRN Temp greater or equal to 38C (100.4F), Mild Pain (1 - 3)  dextrose 40% Gel 15 Gram(s) Oral once PRN Blood Glucose LESS THAN 70 milliGRAM(s)/deciliter  glucagon  Injectable 1 milliGRAM(s) IntraMuscular once PRN Glucose LESS THAN 70 milligrams/deciliter    Allergies    No Known Allergies    Intolerances        LABS:                        13.5   8.80  )-----------( 95       ( 2019 08:34 )             40.5     07-25    136  |  107  |  9   ----------------------------<  209<H>  3.5   |  18<L>  |  0.51    Ca    7.9<L>      2019 08:34    TPro  6.1  /  Alb  3.1<L>  /  TBili  0.8  /  DBili  x   /  AST  9<L>  /  ALT  12  /  AlkPhos  67  07-25    PT/INR - ( 2019 15:12 )   PT: 10.2 sec;   INR: 0.91          PTT - ( 2019 15:12 )  PTT:28.0 sec  Urinalysis Basic - ( 2019 18:15 )    Color: Yellow / Appearance: Clear / S.020 / pH: x  Gluc: x / Ketone: >=80 mg/dL  / Bili: Negative / Urobili: 0.2 E.U./dL   Blood: x / Protein: NEGATIVE mg/dL / Nitrite: NEGATIVE   Leuk Esterase: NEGATIVE / RBC: x / WBC x   Sq Epi: x / Non Sq Epi: x / Bacteria: x      CAPILLARY BLOOD GLUCOSE      POCT Blood Glucose.: 290 mg/dL (2019 12:10)        Culture - Blood (collected 19 @ 16:24)  Source: .Blood Blood-Peripheral  Preliminary Report (19 @ 05:01):    No growth at 12 hours    Culture - Blood (collected 19 @ 16:24)  Source: .Blood Blood-Peripheral  Preliminary Report (19 @ 05:01):    No growth at 12 hours      < from: Xray Chest 1 View-PORTABLE IMMEDIATE (19 @ 16:29) >  Impression:    Mild hypoinflation. No lung infiltrate or pleural effusion. No   pneumothorax. No acute bone abnormality.    < end of copied text >    US of right extremity pending OVERNIGHT EVENTS: admitted overnight with initial blood sugar 419 then 276, positive urine ketones    SUBJECTIVE / INTERVAL HPI: This AM patient seen and examined at bedside. Later seen with  service. He complains of chills, and slight headache but denies fever n/v, chest pain, SOB, abdominal pain, diarrhea, constipation, melena, hematochezia, hematuria, dysuria. Patient states that he does not take his home medication for DM everyday.     VITAL SIGNS:  Vital Signs Last 24 Hrs  T(C): 36.8 (2019 08:47), Max: 38.4 (2019 15:09)  T(F): 98.3 (2019 08:47), Max: 101.2 (2019 15:09)  HR: 94 (2019 08:47) (72 - 107)  BP: 143/68 (2019 08:47) (143/68 - 186/93)  BP(mean): --  RR: 16 (2019 08:47) (16 - 18)  SpO2: 96% (2019 08:47) (96% - 99%)      19 @ 07:01  -  19 @ 07:00  --------------------------------------------------------  IN: 1000 mL / OUT: 351 mL / NET: 649 mL        PHYSICAL EXAM:  General: NAD, Laying comfortably in bed  HEENT: NC/AT, anicteric sclera, MMM  Neck: supple  Cardiovascular: +S1/S2, RRR, No murmurs, rubs, gallops  Respiratory: CTA B/L, no crackles, rhonci, or wheezing  Gastrointestinal: soft, NT/ND, +BSx4  Extremities: upper R extremity with round 3-4cm erythematous lesion, indurated with central crusting, no oozing or pus, no edema, clubbing or cyanosis. right shin with some excoriations   Vascular: 2+ radial B/L  Neurological: no focal deficits    MEDICATIONS  (STANDING):  dextrose 5%. 1000 milliLiter(s) (50 mL/Hr) IV Continuous <Continuous>  dextrose 50% Injectable 12.5 Gram(s) IV Push once  dextrose 50% Injectable 25 Gram(s) IV Push once  dextrose 50% Injectable 25 Gram(s) IV Push once  enoxaparin Injectable 40 milliGRAM(s) SubCutaneous every 24 hours  insulin glargine Injectable (LANTUS) 24 Unit(s) SubCutaneous at bedtime  insulin lispro (HumaLOG) corrective regimen sliding scale   SubCutaneous Before meals and at bedtime  sodium chloride 0.9%. 1000 milliLiter(s) (100 mL/Hr) IV Continuous <Continuous>  vancomycin  IVPB 1000 milliGRAM(s) IV Intermittent every 12 hours    MEDICATIONS  (PRN):  acetaminophen   Tablet .. 650 milliGRAM(s) Oral every 6 hours PRN Temp greater or equal to 38C (100.4F), Mild Pain (1 - 3)  dextrose 40% Gel 15 Gram(s) Oral once PRN Blood Glucose LESS THAN 70 milliGRAM(s)/deciliter  glucagon  Injectable 1 milliGRAM(s) IntraMuscular once PRN Glucose LESS THAN 70 milligrams/deciliter    Allergies    No Known Allergies    Intolerances        LABS:                        13.5   8.80  )-----------( 95       ( 2019 08:34 )             40.5     07-25    136  |  107  |  9   ----------------------------<  209<H>  3.5   |  18<L>  |  0.51    Ca    7.9<L>      2019 08:34    TPro  6.1  /  Alb  3.1<L>  /  TBili  0.8  /  DBili  x   /  AST  9<L>  /  ALT  12  /  AlkPhos  67  07-25    PT/INR - ( 2019 15:12 )   PT: 10.2 sec;   INR: 0.91          PTT - ( 2019 15:12 )  PTT:28.0 sec  Urinalysis Basic - ( 2019 18:15 )    Color: Yellow / Appearance: Clear / S.020 / pH: x  Gluc: x / Ketone: >=80 mg/dL  / Bili: Negative / Urobili: 0.2 E.U./dL   Blood: x / Protein: NEGATIVE mg/dL / Nitrite: NEGATIVE   Leuk Esterase: NEGATIVE / RBC: x / WBC x   Sq Epi: x / Non Sq Epi: x / Bacteria: x      CAPILLARY BLOOD GLUCOSE      POCT Blood Glucose.: 290 mg/dL (2019 12:10)        Culture - Blood (collected 19 @ 16:24)  Source: .Blood Blood-Peripheral  Preliminary Report (19 @ 05:01):    No growth at 12 hours    Culture - Blood (collected 19 @ 16:24)  Source: .Blood Blood-Peripheral  Preliminary Report (19 @ 05:01):    No growth at 12 hours      < from: Xray Chest 1 View-PORTABLE IMMEDIATE (19 @ 16:29) >  Impression:    Mild hypoinflation. No lung infiltrate or pleural effusion. No   pneumothorax. No acute bone abnormality.    < end of copied text >    US of right extremity pending

## 2019-07-26 DIAGNOSIS — I10 ESSENTIAL (PRIMARY) HYPERTENSION: ICD-10-CM

## 2019-07-26 LAB
ANION GAP SERPL CALC-SCNC: 10 MMOL/L — SIGNIFICANT CHANGE UP (ref 5–17)
BUN SERPL-MCNC: 13 MG/DL — SIGNIFICANT CHANGE UP (ref 7–23)
CALCIUM SERPL-MCNC: 8.1 MG/DL — LOW (ref 8.4–10.5)
CHLORIDE SERPL-SCNC: 107 MMOL/L — SIGNIFICANT CHANGE UP (ref 96–108)
CO2 SERPL-SCNC: 21 MMOL/L — LOW (ref 22–31)
CREAT SERPL-MCNC: 0.57 MG/DL — SIGNIFICANT CHANGE UP (ref 0.5–1.3)
GLUCOSE BLDC GLUCOMTR-MCNC: 166 MG/DL — HIGH (ref 70–99)
GLUCOSE BLDC GLUCOMTR-MCNC: 179 MG/DL — HIGH (ref 70–99)
GLUCOSE BLDC GLUCOMTR-MCNC: 209 MG/DL — HIGH (ref 70–99)
GLUCOSE BLDC GLUCOMTR-MCNC: 216 MG/DL — HIGH (ref 70–99)
GLUCOSE BLDC GLUCOMTR-MCNC: 219 MG/DL — HIGH (ref 70–99)
GLUCOSE SERPL-MCNC: 155 MG/DL — HIGH (ref 70–99)
HCT VFR BLD CALC: 39 % — SIGNIFICANT CHANGE UP (ref 39–50)
HGB BLD-MCNC: 13.2 G/DL — SIGNIFICANT CHANGE UP (ref 13–17)
MAGNESIUM SERPL-MCNC: 1.9 MG/DL — SIGNIFICANT CHANGE UP (ref 1.6–2.6)
MCHC RBC-ENTMCNC: 30.1 PG — SIGNIFICANT CHANGE UP (ref 27–34)
MCHC RBC-ENTMCNC: 33.8 GM/DL — SIGNIFICANT CHANGE UP (ref 32–36)
MCV RBC AUTO: 88.8 FL — SIGNIFICANT CHANGE UP (ref 80–100)
NRBC # BLD: 0 /100 WBCS — SIGNIFICANT CHANGE UP (ref 0–0)
PLATELET # BLD AUTO: 107 K/UL — LOW (ref 150–400)
POTASSIUM SERPL-MCNC: 3.4 MMOL/L — LOW (ref 3.5–5.3)
POTASSIUM SERPL-SCNC: 3.4 MMOL/L — LOW (ref 3.5–5.3)
RBC # BLD: 4.39 M/UL — SIGNIFICANT CHANGE UP (ref 4.2–5.8)
RBC # FLD: 12 % — SIGNIFICANT CHANGE UP (ref 10.3–14.5)
SODIUM SERPL-SCNC: 138 MMOL/L — SIGNIFICANT CHANGE UP (ref 135–145)
VANCOMYCIN TROUGH SERPL-MCNC: 8.7 UG/ML — LOW (ref 10–20)
WBC # BLD: 7.19 K/UL — SIGNIFICANT CHANGE UP (ref 3.8–10.5)
WBC # FLD AUTO: 7.19 K/UL — SIGNIFICANT CHANGE UP (ref 3.8–10.5)

## 2019-07-26 PROCEDURE — 99239 HOSP IP/OBS DSCHRG MGMT >30: CPT | Mod: GC

## 2019-07-26 PROCEDURE — 71045 X-RAY EXAM CHEST 1 VIEW: CPT | Mod: 26

## 2019-07-26 PROCEDURE — 76882 US LMTD JT/FCL EVL NVASC XTR: CPT | Mod: 26,RT

## 2019-07-26 RX ORDER — POTASSIUM CHLORIDE 20 MEQ
40 PACKET (EA) ORAL ONCE
Refills: 0 | Status: COMPLETED | OUTPATIENT
Start: 2019-07-26 | End: 2019-07-26

## 2019-07-26 RX ORDER — INSULIN LISPRO 100/ML
6 VIAL (ML) SUBCUTANEOUS
Refills: 0 | Status: DISCONTINUED | OUTPATIENT
Start: 2019-07-26 | End: 2019-07-27

## 2019-07-26 RX ORDER — VANCOMYCIN HCL 1 G
1250 VIAL (EA) INTRAVENOUS EVERY 12 HOURS
Refills: 0 | Status: DISCONTINUED | OUTPATIENT
Start: 2019-07-26 | End: 2019-07-27

## 2019-07-26 RX ORDER — MAGNESIUM SULFATE 500 MG/ML
1 VIAL (ML) INJECTION ONCE
Refills: 0 | Status: COMPLETED | OUTPATIENT
Start: 2019-07-26 | End: 2019-07-26

## 2019-07-26 RX ADMIN — Medication 4: at 22:58

## 2019-07-26 RX ADMIN — Medication 40 MILLIEQUIVALENT(S): at 10:29

## 2019-07-26 RX ADMIN — Medication 6 UNIT(S): at 12:33

## 2019-07-26 RX ADMIN — LOSARTAN POTASSIUM 50 MILLIGRAM(S): 100 TABLET, FILM COATED ORAL at 06:15

## 2019-07-26 RX ADMIN — Medication 4: at 12:33

## 2019-07-26 RX ADMIN — Medication 4: at 17:58

## 2019-07-26 RX ADMIN — Medication 166.67 MILLIGRAM(S): at 10:30

## 2019-07-26 RX ADMIN — Medication 6 UNIT(S): at 17:58

## 2019-07-26 RX ADMIN — ENOXAPARIN SODIUM 40 MILLIGRAM(S): 100 INJECTION SUBCUTANEOUS at 12:18

## 2019-07-26 RX ADMIN — Medication 4 MILLIGRAM(S): at 22:58

## 2019-07-26 RX ADMIN — Medication 2: at 08:30

## 2019-07-26 RX ADMIN — Medication 166.67 MILLIGRAM(S): at 22:58

## 2019-07-26 RX ADMIN — Medication 100 GRAM(S): at 10:29

## 2019-07-26 RX ADMIN — INSULIN GLARGINE 30 UNIT(S): 100 INJECTION, SOLUTION SUBCUTANEOUS at 22:58

## 2019-07-26 NOTE — PROGRESS NOTE ADULT - PROBLEM SELECTOR PLAN 7
F: IVF D5 1L at 50cc  E: replete as needed  N: DASH/TLC, carbohydrate restriction
1) PCP Contacted on Admission: (Y/N) --> Name & Phone #:  2) Date of Contact with PCP: TBD  3) PCP Contacted at Discharge: TBD  4) Summary of Handoff Given to PCP: TBD   5) Post-Discharge Appointment Date: TBD.

## 2019-07-26 NOTE — PROGRESS NOTE ADULT - PROBLEM SELECTOR PROBLEM 7
Nutrition, metabolism, and development symptoms
Transition of care performed with sharing of clinical summary

## 2019-07-26 NOTE — CONSULT NOTE ADULT - ATTENDING COMMENTS
s/p I&D  Cultures with S aureus  Still some induration on right shoulder    Antibiotics  f/u cultures  Dry gauze dressing prn to wound.

## 2019-07-26 NOTE — PROGRESS NOTE ADULT - PROBLEM SELECTOR PLAN 4
presented with sepsis 2/2, T: 101.2,  likely secondary to R arm cellulitis, now resolved, patient afebrile. History of insect bite as per patient. Given empiric dose of vancomycin 1g, azithromycin 500mg IV, ceftriaxone 1g in ED  -c/w vancomycin for cellulitis
history of HTN, 120/72 this AM, normotensive  -consider enalapril 5mg if hypertensive

## 2019-07-26 NOTE — PROGRESS NOTE ADULT - ATTENDING COMMENTS
Pt seen and examined by me with HS at bedside. Agree with above with additions  spoke to patient in Mandarin; states possible mosquito bite of RUE, denies drainage  fever/tachy poa  VSS, exam as above   labs reviewed     a/p;  1. Sepsis poa 2/2 RUE cellulitis-check RUE u/s to r/o abscess, c/w Vanco IV  2. AG Metabolic acidosis likely 2/2 starvation-resolved  3. Type II DM-FS uncontrolled, increase lantus to 30units at bedtime, trend FS  4. Hyponatremia-resolved.     rest of a/p as above.
Pt seen and examined by me at bedside. Agree with above with additions,   Decreased overall swelling and erythema now localized mostly in R deltoid (s/p mosquito bite) area. Consult surgery for I&D.   can be discharged with bactrim/keflex for 7days after I&D. I will f/u with culture result  DMII-FS better controlled, resume home lantus upon discharge.

## 2019-07-26 NOTE — PROGRESS NOTE ADULT - PROBLEM SELECTOR PLAN 1
cellulitis with possible abscess of right arm, scheduled for US R arm today  -c/w vancomycin; AM vanc trough 8.7  -will change vancomycin to 1250mg q12 from 1000mg q12  -will f/u US of right upper extremity  -f/u blood culture  -will get gen surg consult for I&D cellulitis with possible abscess of right arm, scheduled for US R arm today; now centrally localized, continues to be tender to palpation   -c/w vancomycin; AM vanc trough 8.7  -will change vancomycin to 1250mg q12 from 1000mg q12  -will f/u US of right upper extremity  -f/u blood culture, negative to date   -will get gen surg consult for I&D cellulitis with possible abscess of right arm, scheduled for US R arm today; now centrally localized, continues to be tender to palpation   -c/w vancomycin; AM vanc trough 8.7  -next trough July 27 at 8pm  -will change vancomycin to 1250mg q12 from 1000mg q12  -will f/u US of right upper extremity  -f/u blood culture, negative to date   -will get gen surg consult for I&D

## 2019-07-26 NOTE — PROGRESS NOTE ADULT - PROBLEM SELECTOR PLAN 2
patient presented with blood sugar of  419->276, reports poor medication compliance, last dose of home medication 2 days ago. This   -sliding scale, monitor FS  -lantus 30 units bedtime  -4 units lispro premeal
patient presented with blood sugar of  419->276, reports poor medication compliance, last dose of home medication 2 days ago  -sliding scale, monitor FS  -lantus 30 units bedtime  -4 units lispro premeal  -f/u med recs from patient's pharmacy

## 2019-07-26 NOTE — PROGRESS NOTE ADULT - SUBJECTIVE AND OBJECTIVE BOX
INCOMPLETE     OVERNIGHT EVENTS:    SUBJECTIVE / INTERVAL HPI:     VITAL SIGNS:  Vital Signs Last 24 Hrs  T(C): 36.6 (2019 05:22), Max: 37.2 (2019 16:16)  T(F): 97.9 (2019 05:22), Max: 98.9 (2019 16:16)  HR: 81 (2019 05:22) (81 - 87)  BP: 114/66 (2019 05:22) (114/66 - 165/91)  BP(mean): 117 (2019 21:11) (117 - 117)  RR: 18 (2019 05:22) (16 - 18)  SpO2: 97% (2019 05:22) (97% - 97%)      19 @ 07:01  -  19 @ 07:00  --------------------------------------------------------  IN: 250 mL / OUT: 300 mL / NET: -50 mL    19 @ 07:01  -  19 @ 08:49  --------------------------------------------------------  IN: 0 mL / OUT: 450 mL / NET: -450 mL          MEDICATIONS  (STANDING):  dextrose 5%. 1000 milliLiter(s) (50 mL/Hr) IV Continuous <Continuous>  dextrose 50% Injectable 12.5 Gram(s) IV Push once  dextrose 50% Injectable 25 Gram(s) IV Push once  dextrose 50% Injectable 25 Gram(s) IV Push once  doxazosin 4 milliGRAM(s) Oral at bedtime  enoxaparin Injectable 40 milliGRAM(s) SubCutaneous every 24 hours  insulin glargine Injectable (LANTUS) 30 Unit(s) SubCutaneous at bedtime  insulin lispro (HumaLOG) corrective regimen sliding scale   SubCutaneous Before meals and at bedtime  insulin lispro Injectable (HumaLOG) 6 Unit(s) SubCutaneous three times a day before meals  losartan 50 milliGRAM(s) Oral daily  magnesium sulfate  IVPB 1 Gram(s) IV Intermittent once  potassium chloride    Tablet ER 40 milliEquivalent(s) Oral once  vancomycin  IVPB 1250 milliGRAM(s) IV Intermittent every 12 hours    MEDICATIONS  (PRN):  acetaminophen   Tablet .. 650 milliGRAM(s) Oral every 6 hours PRN Temp greater or equal to 38C (100.4F), Mild Pain (1 - 3)  ALBUTerol/ipratropium for Nebulization 3 milliLiter(s) Nebulizer every 6 hours PRN Shortness of Breath and/or Wheezing  dextrose 40% Gel 15 Gram(s) Oral once PRN Blood Glucose LESS THAN 70 milliGRAM(s)/deciliter  glucagon  Injectable 1 milliGRAM(s) IntraMuscular once PRN Glucose LESS THAN 70 milligrams/deciliter    Allergies    No Known Allergies    Intolerances        LABS:                        13.2   7.19  )-----------( 107      ( 2019 06:39 )             39.0     07-    138  |  107  |  13  ----------------------------<  155<H>  3.4<L>   |  21<L>  |  0.57    Ca    8.1<L>      2019 06:39  Mg     1.9     -    TPro  6.1  /  Alb  3.1<L>  /  TBili  0.8  /  DBili  x   /  AST  9<L>  /  ALT  12  /  AlkPhos  67  07-25    PT/INR - ( 2019 15:12 )   PT: 10.2 sec;   INR: 0.91          PTT - ( 2019 15:12 )  PTT:28.0 sec  Urinalysis Basic - ( 2019 18:15 )    Color: Yellow / Appearance: Clear / S.020 / pH: x  Gluc: x / Ketone: >=80 mg/dL  / Bili: Negative / Urobili: 0.2 E.U./dL   Blood: x / Protein: NEGATIVE mg/dL / Nitrite: NEGATIVE   Leuk Esterase: NEGATIVE / RBC: x / WBC x   Sq Epi: x / Non Sq Epi: x / Bacteria: x      CAPILLARY BLOOD GLUCOSE      POCT Blood Glucose.: 179 mg/dL (2019 08:33)          RADIOLOGY & ADDITIONAL TESTS: Reviewed. INCOMPLETE     OVERNIGHT EVENTS:  at 10pm, this . Received 30 units of Lantus at bedtime. Did not get scheduled US of R upper extremity arm last night    SUBJECTIVE / INTERVAL HPI: This AM patient seen and examined at bedside. Communication via  service. Feels less pain on right arm. Denies, headache, chills, fever n/v, chest pain, SOB, abdominal pain, diarrhea, constipation, melena, hematochezia, hematuria, dysuria.     Vanc trough this AM: 8.7    VITAL SIGNS:  T(C): 36.7 (19 @ 09:56), Max: 37.2 (19 @ 16:16)  T(F): 98.1 (19 @ 09:56), Max: 98.9 (19 @ 16:16)  HR: 88 (19 @ 10:01) (81 - 92)  BP: 120/72 (19 @ 10:01) (96/59 - 165/91)  BP(mean): 117 (19 @ 21:11) (117 - 117)  RR: 18 (19 @ 09:56) (16 - 18)  SpO2: 94% (19 @ 09:56) (94% - 97%)  Wt(kg): --    PHYSICAL EXAM:  General: NAD, Laying comfortably in bed  HEENT: NC/AT, anicteric sclera, MMM  Neck: supple  Cardiovascular: +S1/S2, RRR, No murmurs, rubs, gallops  Respiratory: CTA B/L, no crackles, rhonci, or wheezing  Gastrointestinal: soft, NT/ND, +BSx4  Extremities: upper R extremity with now centrally localized round 3cm erythematous lesion, indurated with central crusting, no oozing or pus, no edema, clubbing or cyanosis. right shin with some excoriations, bedside US found small pockets of fluid collection   Vascular: 2+ radial B/L  Neurological: no focal deficits      VITAL SIGNS:  Vital Signs Last 24 Hrs  T(C): 36.6 (2019 05:22), Max: 37.2 (2019 16:16)  T(F): 97.9 (2019 05:22), Max: 98.9 (2019 16:16)  HR: 81 (2019 05:22) (81 - 87)  BP: 114/66 (2019 05:22) (114/66 - 165/91)  BP(mean): 117 (2019 21:11) (117 - 117)  RR: 18 (2019 05:22) (16 - 18)  SpO2: 97% (2019 05:22) (97% - 97%)      19 @ 07:01  -  19 @ 07:00  --------------------------------------------------------  IN: 250 mL / OUT: 300 mL / NET: -50 mL    19 @ 07:01  -  19 @ 08:49  --------------------------------------------------------  IN: 0 mL / OUT: 450 mL / NET: -450 mL          MEDICATIONS  (STANDING):  dextrose 5%. 1000 milliLiter(s) (50 mL/Hr) IV Continuous <Continuous>  dextrose 50% Injectable 12.5 Gram(s) IV Push once  dextrose 50% Injectable 25 Gram(s) IV Push once  dextrose 50% Injectable 25 Gram(s) IV Push once  doxazosin 4 milliGRAM(s) Oral at bedtime  enoxaparin Injectable 40 milliGRAM(s) SubCutaneous every 24 hours  insulin glargine Injectable (LANTUS) 30 Unit(s) SubCutaneous at bedtime  insulin lispro (HumaLOG) corrective regimen sliding scale   SubCutaneous Before meals and at bedtime  insulin lispro Injectable (HumaLOG) 6 Unit(s) SubCutaneous three times a day before meals  losartan 50 milliGRAM(s) Oral daily  magnesium sulfate  IVPB 1 Gram(s) IV Intermittent once  potassium chloride    Tablet ER 40 milliEquivalent(s) Oral once  vancomycin  IVPB 1250 milliGRAM(s) IV Intermittent every 12 hours    MEDICATIONS  (PRN):  acetaminophen   Tablet .. 650 milliGRAM(s) Oral every 6 hours PRN Temp greater or equal to 38C (100.4F), Mild Pain (1 - 3)  ALBUTerol/ipratropium for Nebulization 3 milliLiter(s) Nebulizer every 6 hours PRN Shortness of Breath and/or Wheezing  dextrose 40% Gel 15 Gram(s) Oral once PRN Blood Glucose LESS THAN 70 milliGRAM(s)/deciliter  glucagon  Injectable 1 milliGRAM(s) IntraMuscular once PRN Glucose LESS THAN 70 milligrams/deciliter    Allergies    No Known Allergies    Intolerances        LABS:                        13.2   7.19  )-----------( 107      ( 2019 06:39 )             39.0     07-    138  |  107  |  13  ----------------------------<  155<H>  3.4<L>   |  21<L>  |  0.57    Ca    8.1<L>      2019 06:39  Mg     1.9         TPro  6.1  /  Alb  3.1<L>  /  TBili  0.8  /  DBili  x   /  AST  9<L>  /  ALT  12  /  AlkPhos  67  07-25    PT/INR - ( 2019 15:12 )   PT: 10.2 sec;   INR: 0.91          PTT - ( 2019 15:12 )  PTT:28.0 sec  Urinalysis Basic - ( 2019 18:15 )    Color: Yellow / Appearance: Clear / S.020 / pH: x  Gluc: x / Ketone: >=80 mg/dL  / Bili: Negative / Urobili: 0.2 E.U./dL   Blood: x / Protein: NEGATIVE mg/dL / Nitrite: NEGATIVE   Leuk Esterase: NEGATIVE / RBC: x / WBC x   Sq Epi: x / Non Sq Epi: x / Bacteria: x      CAPILLARY BLOOD GLUCOSE      POCT Blood Glucose.: 179 mg/dL (2019 08:33)          RADIOLOGY & ADDITIONAL TESTS: Reviewed. OVERNIGHT EVENTS:  at 10pm, this . Received 30 units of Lantus at bedtime. Did not get scheduled US of R upper extremity arm last night    SUBJECTIVE / INTERVAL HPI: This AM patient seen and examined at bedside. Communication via  service. Feels less pain on right arm. Denies, headache, chills, fever n/v, chest pain, SOB, abdominal pain, diarrhea, constipation, melena, hematochezia, hematuria, dysuria.     Vanc trough this AM: 8.7    VITAL SIGNS:  T(C): 36.7 (19 @ 09:56), Max: 37.2 (19 @ 16:16)  T(F): 98.1 (19 @ 09:56), Max: 98.9 (19 @ 16:16)  HR: 88 (19 @ 10:01) (81 - 92)  BP: 120/72 (19 @ 10:01) (96/59 - 165/91)  BP(mean): 117 (19 @ 21:11) (117 - 117)  RR: 18 (19 @ 09:56) (16 - 18)  SpO2: 94% (19 @ 09:56) (94% - 97%)  Wt(kg): --    PHYSICAL EXAM:  General: NAD, Laying comfortably in bed  HEENT: NC/AT, anicteric sclera, MMM  Neck: supple  Cardiovascular: +S1/S2, RRR, No murmurs, rubs, gallops  Respiratory: CTA B/L, no crackles, rhonci, or wheezing  Gastrointestinal: soft, NT/ND, +BSx4  Extremities: upper R extremity with now centrally localized round 3cm erythematous lesion, indurated with central crusting, no oozing or pus, no edema, clubbing or cyanosis. right shin with some excoriations, bedside US found small pockets of fluid collection, continues to be tender on palpation   Vascular: 2+ radial B/L  Neurological: no focal deficits      VITAL SIGNS:  Vital Signs Last 24 Hrs  T(C): 36.6 (2019 05:22), Max: 37.2 (2019 16:16)  T(F): 97.9 (2019 05:22), Max: 98.9 (2019 16:16)  HR: 81 (2019 05:22) (81 - 87)  BP: 114/66 (2019 05:22) (114/66 - 165/91)  BP(mean): 117 (2019 21:11) (117 - 117)  RR: 18 (2019 05:22) (16 - 18)  SpO2: 97% (2019 05:22) (97% - 97%)      19 @ 07:01  -  19 @ 07:00  --------------------------------------------------------  IN: 250 mL / OUT: 300 mL / NET: -50 mL    19 @ 07:01  -  19 @ 08:49  --------------------------------------------------------  IN: 0 mL / OUT: 450 mL / NET: -450 mL          MEDICATIONS  (STANDING):  dextrose 5%. 1000 milliLiter(s) (50 mL/Hr) IV Continuous <Continuous>  dextrose 50% Injectable 12.5 Gram(s) IV Push once  dextrose 50% Injectable 25 Gram(s) IV Push once  dextrose 50% Injectable 25 Gram(s) IV Push once  doxazosin 4 milliGRAM(s) Oral at bedtime  enoxaparin Injectable 40 milliGRAM(s) SubCutaneous every 24 hours  insulin glargine Injectable (LANTUS) 30 Unit(s) SubCutaneous at bedtime  insulin lispro (HumaLOG) corrective regimen sliding scale   SubCutaneous Before meals and at bedtime  insulin lispro Injectable (HumaLOG) 6 Unit(s) SubCutaneous three times a day before meals  losartan 50 milliGRAM(s) Oral daily  magnesium sulfate  IVPB 1 Gram(s) IV Intermittent once  potassium chloride    Tablet ER 40 milliEquivalent(s) Oral once  vancomycin  IVPB 1250 milliGRAM(s) IV Intermittent every 12 hours    MEDICATIONS  (PRN):  acetaminophen   Tablet .. 650 milliGRAM(s) Oral every 6 hours PRN Temp greater or equal to 38C (100.4F), Mild Pain (1 - 3)  ALBUTerol/ipratropium for Nebulization 3 milliLiter(s) Nebulizer every 6 hours PRN Shortness of Breath and/or Wheezing  dextrose 40% Gel 15 Gram(s) Oral once PRN Blood Glucose LESS THAN 70 milliGRAM(s)/deciliter  glucagon  Injectable 1 milliGRAM(s) IntraMuscular once PRN Glucose LESS THAN 70 milligrams/deciliter    Allergies    No Known Allergies    Intolerances        LABS:                        13.2   7.19  )-----------( 107      ( 2019 06:39 )             39.0     07-    138  |  107  |  13  ----------------------------<  155<H>  3.4<L>   |  21<L>  |  0.57    Ca    8.1<L>      2019 06:39  Mg     1.9         TPro  6.1  /  Alb  3.1<L>  /  TBili  0.8  /  DBili  x   /  AST  9<L>  /  ALT  12  /  AlkPhos  67  07-25    PT/INR - ( 2019 15:12 )   PT: 10.2 sec;   INR: 0.91          PTT - ( 2019 15:12 )  PTT:28.0 sec  Urinalysis Basic - ( 2019 18:15 )    Color: Yellow / Appearance: Clear / S.020 / pH: x  Gluc: x / Ketone: >=80 mg/dL  / Bili: Negative / Urobili: 0.2 E.U./dL   Blood: x / Protein: NEGATIVE mg/dL / Nitrite: NEGATIVE   Leuk Esterase: NEGATIVE / RBC: x / WBC x   Sq Epi: x / Non Sq Epi: x / Bacteria: x      CAPILLARY BLOOD GLUCOSE      POCT Blood Glucose.: 179 mg/dL (2019 08:33)          RADIOLOGY & ADDITIONAL TESTS: Reviewed. OVERNIGHT EVENTS:  at 10pm, this . Received 30 units of Lantus at bedtime. Did not get scheduled US of R upper extremity arm last night    SUBJECTIVE / INTERVAL HPI: This AM patient seen and examined at bedside. Communication via  service. Feels less pain on right arm. Denies, headache, chills, fever n/v, chest pain, SOB, abdominal pain, diarrhea, constipation, melena, hematochezia, hematuria, dysuria.     Vanc trough this AM: 8.7  Next trough  8pm    VITAL SIGNS:  T(C): 36.7 (19 @ 09:56), Max: 37.2 (19 @ 16:16)  T(F): 98.1 (19 @ 09:56), Max: 98.9 (19 @ 16:16)  HR: 88 (19 @ 10:01) (81 - 92)  BP: 120/72 (19 @ 10:01) (96/59 - 165/91)  BP(mean): 117 (19 @ 21:11) (117 - 117)  RR: 18 (19 @ 09:56) (16 - 18)  SpO2: 94% (19 @ 09:56) (94% - 97%)  Wt(kg): --    PHYSICAL EXAM:  General: NAD, Laying comfortably in bed  HEENT: NC/AT, anicteric sclera, MMM  Neck: supple  Cardiovascular: +S1/S2, RRR, No murmurs, rubs, gallops  Respiratory: CTA B/L, no crackles, rhonci, or wheezing  Gastrointestinal: soft, NT/ND, +BSx4  Extremities: upper R extremity with now centrally localized round 3cm erythematous lesion, indurated with central crusting, no oozing or pus, no edema, clubbing or cyanosis. right shin with some excoriations, bedside US found small pockets of fluid collection, continues to be tender on palpation   Vascular: 2+ radial B/L  Neurological: no focal deficits      VITAL SIGNS:  Vital Signs Last 24 Hrs  T(C): 36.6 (2019 05:22), Max: 37.2 (2019 16:16)  T(F): 97.9 (2019 05:22), Max: 98.9 (2019 16:16)  HR: 81 (2019 05:22) (81 - 87)  BP: 114/66 (2019 05:22) (114/66 - 165/91)  BP(mean): 117 (2019 21:11) (117 - 117)  RR: 18 (2019 05:22) (16 - 18)  SpO2: 97% (2019 05:22) (97% - 97%)      19 @ 07:01  -  19 @ 07:00  --------------------------------------------------------  IN: 250 mL / OUT: 300 mL / NET: -50 mL    19 @ 07:01  19 @ 08:49  --------------------------------------------------------  IN: 0 mL / OUT: 450 mL / NET: -450 mL          MEDICATIONS  (STANDING):  dextrose 5%. 1000 milliLiter(s) (50 mL/Hr) IV Continuous <Continuous>  dextrose 50% Injectable 12.5 Gram(s) IV Push once  dextrose 50% Injectable 25 Gram(s) IV Push once  dextrose 50% Injectable 25 Gram(s) IV Push once  doxazosin 4 milliGRAM(s) Oral at bedtime  enoxaparin Injectable 40 milliGRAM(s) SubCutaneous every 24 hours  insulin glargine Injectable (LANTUS) 30 Unit(s) SubCutaneous at bedtime  insulin lispro (HumaLOG) corrective regimen sliding scale   SubCutaneous Before meals and at bedtime  insulin lispro Injectable (HumaLOG) 6 Unit(s) SubCutaneous three times a day before meals  losartan 50 milliGRAM(s) Oral daily  magnesium sulfate  IVPB 1 Gram(s) IV Intermittent once  potassium chloride    Tablet ER 40 milliEquivalent(s) Oral once  vancomycin  IVPB 1250 milliGRAM(s) IV Intermittent every 12 hours    MEDICATIONS  (PRN):  acetaminophen   Tablet .. 650 milliGRAM(s) Oral every 6 hours PRN Temp greater or equal to 38C (100.4F), Mild Pain (1 - 3)  ALBUTerol/ipratropium for Nebulization 3 milliLiter(s) Nebulizer every 6 hours PRN Shortness of Breath and/or Wheezing  dextrose 40% Gel 15 Gram(s) Oral once PRN Blood Glucose LESS THAN 70 milliGRAM(s)/deciliter  glucagon  Injectable 1 milliGRAM(s) IntraMuscular once PRN Glucose LESS THAN 70 milligrams/deciliter    Allergies    No Known Allergies    Intolerances        LABS:                        13.2   7.19  )-----------( 107      ( 2019 06:39 )             39.0     07-    138  |  107  |  13  ----------------------------<  155<H>  3.4<L>   |  21<L>  |  0.57    Ca    8.1<L>      2019 06:39  Mg     1.9         TPro  6.1  /  Alb  3.1<L>  /  TBili  0.8  /  DBili  x   /  AST  9<L>  /  ALT  12  /  AlkPhos  67  07-25    PT/INR - ( 2019 15:12 )   PT: 10.2 sec;   INR: 0.91          PTT - ( 2019 15:12 )  PTT:28.0 sec  Urinalysis Basic - ( 2019 18:15 )    Color: Yellow / Appearance: Clear / S.020 / pH: x  Gluc: x / Ketone: >=80 mg/dL  / Bili: Negative / Urobili: 0.2 E.U./dL   Blood: x / Protein: NEGATIVE mg/dL / Nitrite: NEGATIVE   Leuk Esterase: NEGATIVE / RBC: x / WBC x   Sq Epi: x / Non Sq Epi: x / Bacteria: x      CAPILLARY BLOOD GLUCOSE      POCT Blood Glucose.: 179 mg/dL (2019 08:33)          RADIOLOGY & ADDITIONAL TESTS: Reviewed.

## 2019-07-26 NOTE — PROGRESS NOTE ADULT - PROBLEM SELECTOR PLAN 6
history of HTN, 143/68   -obtain med recs
F: IVF D5 1L at 50cc  E: replete as needed  N: DASH/TLC, carbohydrate restriction.

## 2019-07-26 NOTE — PROGRESS NOTE ADULT - PROBLEM SELECTOR PLAN 3
history of DM, home dose of lantus of 40units at bedtime, poor adherence of home medication  -c/w sliding scale, monitor FS  -c/w 30 units of lantus.
history of DM, home dose of lantus of 40units at bedtime, poor adherence of home medication  -sliding scale, monitor FS  -f/u med recs from patient's pharmacy  -30 units of lantus

## 2019-07-26 NOTE — PROCEDURE NOTE - NSANATOMICLLOCATION_GEN_A_CORE
" Anesthesia Evaluation     Patient summary reviewed   NPO Solid Status: > 8 hours  NPO Liquid Status: > 8 hours           Airway   Mallampati: II  TM distance: >3 FB  Neck ROM: full  No difficulty expected  Dental    (+) upper dentures    Pulmonary     breath sounds clear to auscultation  (+) a smoker Former, COPD mild, sleep apnea,   Cardiovascular   Exercise tolerance: good (4-7 METS)    NYHA Classification: II  ECG reviewed  PT is on anticoagulation therapy  Rhythm: regular  Rate: normal    (+) hypertension poorly controlled, CAD, PVD, hyperlipidemia,  carotid artery disease right carotid      Neuro/Psych  (+) psychiatric history Depression,     GI/Hepatic/Renal/Endo    (+)   diabetes mellitus type 2, hypothyroidism,     Musculoskeletal     Abdominal     Abdomen: soft.   Substance History      OB/GYN          Other   (+) arthritis                     Anesthesia Plan    ASA 3     general   (GA and the arterial line explained to the patient who has taken prednisone for \"joint pain\" during the last four months.)  intravenous induction   Anesthetic plan and risks discussed with patient.      " right/lateral upper arm, overlying deltoid

## 2019-07-26 NOTE — CONSULT NOTE ADULT - SUBJECTIVE AND OBJECTIVE BOX
Attending: Juan     HPI: 74M PMHx DM, HTN presents with 1 week of progressively worsening R lateral arm redness and pain, beginning as a small red bump that he thought was a bug bite and progressively enlarging and getting more painful, associated with subjective fevers and chills at home. No chest pain, dyspnea, nausea, vomiting, abdominal pain, constipation or diarrhea. Has never had symptoms like this before. On admission, febrile to 101.2, tachy to 101 and hypertensive to 186/93. Started on ceftriaxone and then broadened to vancomycin.     PMH: as above  PSH: none  Meds: lantus 40 qhs, humalog 6U premeal TID  Allerg: NKDA  SH: nonsmoker, social drinker, no other drug use  FH: noncontributory    PAST MEDICAL & SURGICAL HISTORY:  Type 2 diabetes mellitus: Diagnosed roughly 10 years ago and on insulin since  HTN (hypertension)  No significant past surgical history      MEDICATIONS  (STANDING):  dextrose 5%. 1000 milliLiter(s) (50 mL/Hr) IV Continuous <Continuous>  dextrose 50% Injectable 12.5 Gram(s) IV Push once  dextrose 50% Injectable 25 Gram(s) IV Push once  dextrose 50% Injectable 25 Gram(s) IV Push once  doxazosin 4 milliGRAM(s) Oral at bedtime  enoxaparin Injectable 40 milliGRAM(s) SubCutaneous every 24 hours  insulin glargine Injectable (LANTUS) 30 Unit(s) SubCutaneous at bedtime  insulin lispro (HumaLOG) corrective regimen sliding scale   SubCutaneous Before meals and at bedtime  insulin lispro Injectable (HumaLOG) 6 Unit(s) SubCutaneous three times a day before meals  losartan 50 milliGRAM(s) Oral daily  vancomycin  IVPB 1250 milliGRAM(s) IV Intermittent every 12 hours    MEDICATIONS  (PRN):  acetaminophen   Tablet .. 650 milliGRAM(s) Oral every 6 hours PRN Temp greater or equal to 38C (100.4F), Mild Pain (1 - 3)  ALBUTerol/ipratropium for Nebulization 3 milliLiter(s) Nebulizer every 6 hours PRN Shortness of Breath and/or Wheezing  dextrose 40% Gel 15 Gram(s) Oral once PRN Blood Glucose LESS THAN 70 milliGRAM(s)/deciliter  glucagon  Injectable 1 milliGRAM(s) IntraMuscular once PRN Glucose LESS THAN 70 milligrams/deciliter      Allergies    No Known Allergies    Intolerances        FAMILY HISTORY:  FH: diabetes mellitus: mother and father      ROS: otherwise negative.    Vital Signs Last 24 Hrs  T(C): 36.5 (26 Jul 2019 16:55), Max: 36.7 (26 Jul 2019 09:56)  T(F): 97.7 (26 Jul 2019 16:55), Max: 98.1 (26 Jul 2019 09:56)  HR: 76 (26 Jul 2019 16:55) (76 - 92)  BP: 136/97 (26 Jul 2019 16:55) (96/59 - 136/97)  BP(mean): --  RR: 18 (26 Jul 2019 16:55) (18 - 18)  SpO2: 98% (26 Jul 2019 16:55) (94% - 98%)    I&O's Summary    25 Jul 2019 07:01  -  26 Jul 2019 07:00  --------------------------------------------------------  IN: 250 mL / OUT: 300 mL / NET: -50 mL    26 Jul 2019 07:01  -  26 Jul 2019 22:01  --------------------------------------------------------  IN: 0 mL / OUT: 450 mL / NET: -450 mL        Physical Exam:  General: NAD, resting comfortably in bed  HEENT: MMM  Pulmonary: nonlabored breathing, normal resp effort  Cardiovascular: RRR  Extremities: WWP, no edema, no calf tenderness; lateral R upper arm with 6x6 area of erythema and induration over deltoid, about 6x6cm, central eschar and some skin sloughing, no active ooze but central area of darkening  Neuro: no focal deficits  Psych: pleasant    LABS:                        13.2   7.19  )-----------( 107      ( 26 Jul 2019 06:39 )             39.0     07-26    138  |  107  |  13  ----------------------------<  155<H>  3.4<L>   |  21<L>  |  0.57    Ca    8.1<L>      26 Jul 2019 06:39  Mg     1.9     07-26    TPro  6.1  /  Alb  3.1<L>  /  TBili  0.8  /  DBili  x   /  AST  9<L>  /  ALT  12  /  AlkPhos  67  07-25        CAPILLARY BLOOD GLUCOSE      POCT Blood Glucose.: 209 mg/dL (26 Jul 2019 16:58)  POCT Blood Glucose.: 219 mg/dL (26 Jul 2019 12:25)  POCT Blood Glucose.: 179 mg/dL (26 Jul 2019 08:33)  POCT Blood Glucose.: 166 mg/dL (26 Jul 2019 01:23)  POCT Blood Glucose.: 305 mg/dL (25 Jul 2019 22:33)    LIVER FUNCTIONS - ( 25 Jul 2019 08:34 )  Alb: 3.1 g/dL / Pro: 6.1 g/dL / ALK PHOS: 67 U/L / ALT: 12 U/L / AST: 9 U/L / GGT: x             Cultures:      RADIOLOGY & ADDITIONAL STUDIES:  < from: US Extremity Nonvasc Limited, Right (07.26.19 @ 16:14) >    Findings: There appears to be an area of subcutaneous edema in the right   upper arm laterally. No drainable fluid collection or abscess identified.   No foreign body identified.    < end of copied text >      Assessment: 74M PMHx DM, HTN a/w soft tissue infection on R lateral arm.    Recommendations:  - bedside I&D by general surgery  - surgery will change dressings tomorrow  - continue antibiotics   - Team 4C will continue to follow; please page Team 4 with any questions or concerns  - discussed with chief on call, attending surgeon

## 2019-07-27 VITALS
SYSTOLIC BLOOD PRESSURE: 144 MMHG | OXYGEN SATURATION: 98 % | HEART RATE: 81 BPM | TEMPERATURE: 98 F | DIASTOLIC BLOOD PRESSURE: 77 MMHG | RESPIRATION RATE: 18 BRPM

## 2019-07-27 LAB
ANION GAP SERPL CALC-SCNC: 10 MMOL/L — SIGNIFICANT CHANGE UP (ref 5–17)
BUN SERPL-MCNC: 12 MG/DL — SIGNIFICANT CHANGE UP (ref 7–23)
CALCIUM SERPL-MCNC: 8.2 MG/DL — LOW (ref 8.4–10.5)
CHLORIDE SERPL-SCNC: 108 MMOL/L — SIGNIFICANT CHANGE UP (ref 96–108)
CO2 SERPL-SCNC: 23 MMOL/L — SIGNIFICANT CHANGE UP (ref 22–31)
CREAT SERPL-MCNC: 0.51 MG/DL — SIGNIFICANT CHANGE UP (ref 0.5–1.3)
GLUCOSE BLDC GLUCOMTR-MCNC: 161 MG/DL — HIGH (ref 70–99)
GLUCOSE BLDC GLUCOMTR-MCNC: 190 MG/DL — HIGH (ref 70–99)
GLUCOSE BLDC GLUCOMTR-MCNC: 303 MG/DL — HIGH (ref 70–99)
GLUCOSE SERPL-MCNC: 187 MG/DL — HIGH (ref 70–99)
GRAM STN FLD: SIGNIFICANT CHANGE UP
HCT VFR BLD CALC: 41.5 % — SIGNIFICANT CHANGE UP (ref 39–50)
HGB BLD-MCNC: 13.8 G/DL — SIGNIFICANT CHANGE UP (ref 13–17)
MAGNESIUM SERPL-MCNC: 2 MG/DL — SIGNIFICANT CHANGE UP (ref 1.6–2.6)
MCHC RBC-ENTMCNC: 29.9 PG — SIGNIFICANT CHANGE UP (ref 27–34)
MCHC RBC-ENTMCNC: 33.3 GM/DL — SIGNIFICANT CHANGE UP (ref 32–36)
MCV RBC AUTO: 90 FL — SIGNIFICANT CHANGE UP (ref 80–100)
NRBC # BLD: 0 /100 WBCS — SIGNIFICANT CHANGE UP (ref 0–0)
PLATELET # BLD AUTO: 134 K/UL — LOW (ref 150–400)
POTASSIUM SERPL-MCNC: 4.1 MMOL/L — SIGNIFICANT CHANGE UP (ref 3.5–5.3)
POTASSIUM SERPL-SCNC: 4.1 MMOL/L — SIGNIFICANT CHANGE UP (ref 3.5–5.3)
RBC # BLD: 4.61 M/UL — SIGNIFICANT CHANGE UP (ref 4.2–5.8)
RBC # FLD: 12.1 % — SIGNIFICANT CHANGE UP (ref 10.3–14.5)
SODIUM SERPL-SCNC: 141 MMOL/L — SIGNIFICANT CHANGE UP (ref 135–145)
SPECIMEN SOURCE: SIGNIFICANT CHANGE UP
WBC # BLD: 5.83 K/UL — SIGNIFICANT CHANGE UP (ref 3.8–10.5)
WBC # FLD AUTO: 5.83 K/UL — SIGNIFICANT CHANGE UP (ref 3.8–10.5)

## 2019-07-27 PROCEDURE — 82330 ASSAY OF CALCIUM: CPT

## 2019-07-27 PROCEDURE — 82803 BLOOD GASES ANY COMBINATION: CPT

## 2019-07-27 PROCEDURE — 85027 COMPLETE CBC AUTOMATED: CPT

## 2019-07-27 PROCEDURE — 80202 ASSAY OF VANCOMYCIN: CPT

## 2019-07-27 PROCEDURE — 87075 CULTR BACTERIA EXCEPT BLOOD: CPT

## 2019-07-27 PROCEDURE — 84295 ASSAY OF SERUM SODIUM: CPT

## 2019-07-27 PROCEDURE — 76882 US LMTD JT/FCL EVL NVASC XTR: CPT

## 2019-07-27 PROCEDURE — 36415 COLL VENOUS BLD VENIPUNCTURE: CPT

## 2019-07-27 PROCEDURE — 80048 BASIC METABOLIC PNL TOTAL CA: CPT

## 2019-07-27 PROCEDURE — 83880 ASSAY OF NATRIURETIC PEPTIDE: CPT

## 2019-07-27 PROCEDURE — 87070 CULTURE OTHR SPECIMN AEROBIC: CPT

## 2019-07-27 PROCEDURE — 71045 X-RAY EXAM CHEST 1 VIEW: CPT

## 2019-07-27 PROCEDURE — 84132 ASSAY OF SERUM POTASSIUM: CPT

## 2019-07-27 PROCEDURE — 96365 THER/PROPH/DIAG IV INF INIT: CPT

## 2019-07-27 PROCEDURE — 86803 HEPATITIS C AB TEST: CPT

## 2019-07-27 PROCEDURE — 81003 URINALYSIS AUTO W/O SCOPE: CPT

## 2019-07-27 PROCEDURE — 84484 ASSAY OF TROPONIN QUANT: CPT

## 2019-07-27 PROCEDURE — 83036 HEMOGLOBIN GLYCOSYLATED A1C: CPT

## 2019-07-27 PROCEDURE — 85610 PROTHROMBIN TIME: CPT

## 2019-07-27 PROCEDURE — 87205 SMEAR GRAM STAIN: CPT

## 2019-07-27 PROCEDURE — 99232 SBSQ HOSP IP/OBS MODERATE 35: CPT | Mod: GC

## 2019-07-27 PROCEDURE — 93005 ELECTROCARDIOGRAM TRACING: CPT

## 2019-07-27 PROCEDURE — 99285 EMERGENCY DEPT VISIT HI MDM: CPT | Mod: 25

## 2019-07-27 PROCEDURE — 83605 ASSAY OF LACTIC ACID: CPT

## 2019-07-27 PROCEDURE — 87040 BLOOD CULTURE FOR BACTERIA: CPT

## 2019-07-27 PROCEDURE — 80053 COMPREHEN METABOLIC PANEL: CPT

## 2019-07-27 PROCEDURE — 73060 X-RAY EXAM OF HUMERUS: CPT

## 2019-07-27 PROCEDURE — 83735 ASSAY OF MAGNESIUM: CPT

## 2019-07-27 PROCEDURE — 82962 GLUCOSE BLOOD TEST: CPT

## 2019-07-27 PROCEDURE — 87186 SC STD MICRODIL/AGAR DIL: CPT

## 2019-07-27 PROCEDURE — 82010 KETONE BODYS QUAN: CPT

## 2019-07-27 PROCEDURE — 85730 THROMBOPLASTIN TIME PARTIAL: CPT

## 2019-07-27 PROCEDURE — 96375 TX/PRO/DX INJ NEW DRUG ADDON: CPT

## 2019-07-27 PROCEDURE — 80307 DRUG TEST PRSMV CHEM ANLYZR: CPT

## 2019-07-27 PROCEDURE — 85025 COMPLETE CBC W/AUTO DIFF WBC: CPT

## 2019-07-27 RX ADMIN — Medication 166.67 MILLIGRAM(S): at 10:17

## 2019-07-27 RX ADMIN — Medication 2: at 17:51

## 2019-07-27 RX ADMIN — Medication 6 UNIT(S): at 09:13

## 2019-07-27 RX ADMIN — LOSARTAN POTASSIUM 50 MILLIGRAM(S): 100 TABLET, FILM COATED ORAL at 07:07

## 2019-07-27 RX ADMIN — ENOXAPARIN SODIUM 40 MILLIGRAM(S): 100 INJECTION SUBCUTANEOUS at 13:07

## 2019-07-27 RX ADMIN — Medication 8: at 13:06

## 2019-07-27 RX ADMIN — Medication 6 UNIT(S): at 17:50

## 2019-07-27 RX ADMIN — Medication 6 UNIT(S): at 13:06

## 2019-07-27 RX ADMIN — Medication 2: at 09:14

## 2019-07-27 NOTE — DISCHARGE NOTE PROVIDER - HOSPITAL COURSE
74 year old M with DM2 and HTN presented to [facility] for right upper extremity insect bite, admitted for hyperglycemia (500) w/ anion gap acidosis and sepsis (T: 101.2, ), secondary to RUE cellulitis on July 24, 2019. Right upper arm lesion was spherical 3x3cm, non-purulent, centrally crusting, and erythema. 7/26 Ultrasound of lesion showed no drainable fluid collection or abscess. General surgery consulted for I&D in setting of non-improving cellulitis with vancomycin therapy. I&D performed with 5ccs of drainage. Fluid positive for moderate Staph. aureus. Patient's DM managed with 30 units Lantus and 6 units Lispro premeal. Patient to be discharged on clindamycin x5 days. 74 year old M with DM2 and HTN presented to [facility] for right upper extremity insect bite, admitted for hyperglycemia (500) w/ anion gap acidosis and sepsis (T: 101.2, ), secondary to RUE cellulitis on July 24, 2019. Right upper arm lesion was spherical 3x3cm, non-purulent, centrally crusting, and erythema. 7/26 Ultrasound of lesion showed no drainable fluid collection or abscess. General surgery consulted for I&D in setting of non-improving cellulitis with vancomycin therapy. I&D performed with 5ccs of drainage. Fluid positive for moderate Staph. aureus. Patient's DM managed with 30 units Lantus and 6 units Lispro premeal. Patient to be discharged on clindamycin x5 days. If culture sensitivities are R to clinda, will follow up with son at [phone].

## 2019-07-27 NOTE — DISCHARGE NOTE NURSING/CASE MANAGEMENT/SOCIAL WORK - NSDCDPATPORTLINK_GEN_ALL_CORE
You can access the [facility], offered by [facility], by registering with the following website: [email or web address]

## 2019-07-27 NOTE — PROGRESS NOTE ADULT - SUBJECTIVE AND OBJECTIVE BOX
Changed dressing for patient prior to discharge later today.  jeffrey on iPad was use to communicate in mandarin and explain to daughter in law about the dressing and procedure.     Vital Signs Last 24 Hrs  T(C): 36.4 (27 Jul 2019 16:13), Max: 37.6 (27 Jul 2019 05:13)  T(F): 97.5 (27 Jul 2019 16:13), Max: 99.7 (27 Jul 2019 05:13)  HR: 81 (27 Jul 2019 16:13) (71 - 95)  BP: 144/77 (27 Jul 2019 16:13) (96/59 - 146/84)  BP(mean): --  RR: 18 (27 Jul 2019 16:13) (14 - 18)  SpO2: 98% (27 Jul 2019 16:13) (94% - 99%)    Gen: NAD  CV: NSR  Resp: No distress  Ext: upper R extremity with 3cm circular incision left open, packed with strip gauze, with 4x4 placed on top. Surrounding induration & erythema.

## 2019-07-27 NOTE — PROGRESS NOTE ADULT - REASON FOR ADMISSION
Sepsis 2/2 cellulitis of right upper arm

## 2019-07-27 NOTE — DISCHARGE NOTE PROVIDER - NSDCCPCAREPLAN_GEN_ALL_CORE_FT
PRINCIPAL DISCHARGE DIAGNOSIS  Diagnosis: Cellulitis  Assessment and Plan of Treatment: On July 27, 2019 you were admitted to [facility] for cellulitis of your right arm caused by a mosquito bite. During your hospital stay you were treated with antibiotics for the infection. An ultrasound of the wound revealed no significant evidence of abscess. An incision and drainage procedure was peerformed byt he surgery team and drained 5cc of fluid. This fluid was positive for Staphylococcus aureus bacteria. You are being sent home with a 5 day course of antibiotics. Please make sure to finish your antibiotic therapy.      SECONDARY DISCHARGE DIAGNOSES  Diagnosis: Hyperglycemia  Assessment and Plan of Treatment: On admission to the hospital, you were found to have an elevated blood sugar level of 500. You mentioned that you did not take your diabetes medication for 2 days prior to arriving to the hospital. It is important that you adhere to your home medications to keep your blood sugar at a safe range.

## 2019-07-27 NOTE — PROGRESS NOTE ADULT - ASSESSMENT
74M w/ right upper extremity lesion abscess overlying deltoid, s/p incision & drainage POD1    - changed dressing  - follow up cultures & sensitivities  - treatment per primary
74 year old M with PMH of DM and HTN presenting to [facility] for right upper extremity lesion and admitted for sepsis secondary to right arm cellulitis and hyperglycemia, now being managed for uncontrolled DM and possible right arm abscess
74 year old M with PMH of DM and HTN presenting to [facility] for right upper extremity lesion and admitted for sepsis secondary to right arm cellulitis and hyperglycemia, now being managed for hyperglycemia and possible right arm abscess

## 2019-07-28 ENCOUNTER — INPATIENT (INPATIENT)
Facility: HOSPITAL | Age: 74
LOS: 2 days | Discharge: NURSING, REHAB OR LONG-TERM CARE | DRG: 74 | End: 2019-07-31
Attending: STUDENT IN AN ORGANIZED HEALTH CARE EDUCATION/TRAINING PROGRAM | Admitting: STUDENT IN AN ORGANIZED HEALTH CARE EDUCATION/TRAINING PROGRAM
Payer: MEDICARE

## 2019-07-28 VITALS
DIASTOLIC BLOOD PRESSURE: 79 MMHG | TEMPERATURE: 98 F | OXYGEN SATURATION: 97 % | RESPIRATION RATE: 18 BRPM | SYSTOLIC BLOOD PRESSURE: 119 MMHG | HEART RATE: 96 BPM

## 2019-07-28 DIAGNOSIS — E11.9 TYPE 2 DIABETES MELLITUS WITHOUT COMPLICATIONS: ICD-10-CM

## 2019-07-28 DIAGNOSIS — Z91.81 HISTORY OF FALLING: ICD-10-CM

## 2019-07-28 DIAGNOSIS — R63.8 OTHER SYMPTOMS AND SIGNS CONCERNING FOOD AND FLUID INTAKE: ICD-10-CM

## 2019-07-28 DIAGNOSIS — I10 ESSENTIAL (PRIMARY) HYPERTENSION: ICD-10-CM

## 2019-07-28 DIAGNOSIS — R26.81 UNSTEADINESS ON FEET: ICD-10-CM
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
## 2019-07-28 DIAGNOSIS — Z29.9 ENCOUNTER FOR PROPHYLACTIC MEASURES, UNSPECIFIED: ICD-10-CM

## 2019-07-28 LAB
-  CEFAZOLIN: SIGNIFICANT CHANGE UP
-  CLINDAMYCIN: SIGNIFICANT CHANGE UP
-  ERYTHROMYCIN: SIGNIFICANT CHANGE UP
-  LINEZOLID: SIGNIFICANT CHANGE UP
-  OXACILLIN: SIGNIFICANT CHANGE UP
-  PENICILLIN: SIGNIFICANT CHANGE UP
-  RIFAMPIN: SIGNIFICANT CHANGE UP
-  TRIMETHOPRIM/SULFAMETHOXAZOLE: SIGNIFICANT CHANGE UP
-  VANCOMYCIN: SIGNIFICANT CHANGE UP
ALBUMIN SERPL ELPH-MCNC: 3.3 G/DL — SIGNIFICANT CHANGE UP (ref 3.3–5)
ALP SERPL-CCNC: 63 U/L — SIGNIFICANT CHANGE UP (ref 40–120)
ALT FLD-CCNC: 13 U/L — SIGNIFICANT CHANGE UP (ref 10–45)
ANION GAP SERPL CALC-SCNC: 11 MMOL/L — SIGNIFICANT CHANGE UP (ref 5–17)
APPEARANCE UR: CLEAR — SIGNIFICANT CHANGE UP
AST SERPL-CCNC: 14 U/L — SIGNIFICANT CHANGE UP (ref 10–40)
B-OH-BUTYR SERPL-SCNC: 0.9 MMOL/L — HIGH
BASOPHILS # BLD AUTO: 0.01 K/UL — SIGNIFICANT CHANGE UP (ref 0–0.2)
BASOPHILS NFR BLD AUTO: 0.2 % — SIGNIFICANT CHANGE UP (ref 0–2)
BILIRUB SERPL-MCNC: 0.6 MG/DL — SIGNIFICANT CHANGE UP (ref 0.2–1.2)
BILIRUB UR-MCNC: NEGATIVE — SIGNIFICANT CHANGE UP
BUN SERPL-MCNC: 14 MG/DL — SIGNIFICANT CHANGE UP (ref 7–23)
CALCIUM SERPL-MCNC: 8.6 MG/DL — SIGNIFICANT CHANGE UP (ref 8.4–10.5)
CHLORIDE SERPL-SCNC: 104 MMOL/L — SIGNIFICANT CHANGE UP (ref 96–108)
CO2 SERPL-SCNC: 23 MMOL/L — SIGNIFICANT CHANGE UP (ref 22–31)
COLOR SPEC: YELLOW — SIGNIFICANT CHANGE UP
CREAT SERPL-MCNC: 0.62 MG/DL — SIGNIFICANT CHANGE UP (ref 0.5–1.3)
CULTURE RESULTS: SIGNIFICANT CHANGE UP
DIFF PNL FLD: NEGATIVE — SIGNIFICANT CHANGE UP
EOSINOPHIL # BLD AUTO: 0.08 K/UL — SIGNIFICANT CHANGE UP (ref 0–0.5)
EOSINOPHIL NFR BLD AUTO: 1.5 % — SIGNIFICANT CHANGE UP (ref 0–6)
GAS PNL BLDV: SIGNIFICANT CHANGE UP
GLUCOSE BLDC GLUCOMTR-MCNC: 304 MG/DL — HIGH (ref 70–99)
GLUCOSE BLDC GLUCOMTR-MCNC: 312 MG/DL — HIGH (ref 70–99)
GLUCOSE SERPL-MCNC: 182 MG/DL — HIGH (ref 70–99)
GLUCOSE UR QL: 250
HCT VFR BLD CALC: 39.1 % — SIGNIFICANT CHANGE UP (ref 39–50)
HGB BLD-MCNC: 13.1 G/DL — SIGNIFICANT CHANGE UP (ref 13–17)
IMM GRANULOCYTES NFR BLD AUTO: 0.7 % — SIGNIFICANT CHANGE UP (ref 0–1.5)
KETONES UR-MCNC: 40 MG/DL
LACTATE SERPL-SCNC: 1.1 MMOL/L — SIGNIFICANT CHANGE UP (ref 0.5–2)
LEUKOCYTE ESTERASE UR-ACNC: NEGATIVE — SIGNIFICANT CHANGE UP
LYMPHOCYTES # BLD AUTO: 0.76 K/UL — LOW (ref 1–3.3)
LYMPHOCYTES # BLD AUTO: 13.8 % — SIGNIFICANT CHANGE UP (ref 13–44)
MCHC RBC-ENTMCNC: 30.1 PG — SIGNIFICANT CHANGE UP (ref 27–34)
MCHC RBC-ENTMCNC: 33.5 GM/DL — SIGNIFICANT CHANGE UP (ref 32–36)
MCV RBC AUTO: 89.9 FL — SIGNIFICANT CHANGE UP (ref 80–100)
METHOD TYPE: SIGNIFICANT CHANGE UP
MONOCYTES # BLD AUTO: 0.74 K/UL — SIGNIFICANT CHANGE UP (ref 0–0.9)
MONOCYTES NFR BLD AUTO: 13.5 % — SIGNIFICANT CHANGE UP (ref 2–14)
NEUTROPHILS # BLD AUTO: 3.86 K/UL — SIGNIFICANT CHANGE UP (ref 1.8–7.4)
NEUTROPHILS NFR BLD AUTO: 70.3 % — SIGNIFICANT CHANGE UP (ref 43–77)
NITRITE UR-MCNC: NEGATIVE — SIGNIFICANT CHANGE UP
NRBC # BLD: 0 /100 WBCS — SIGNIFICANT CHANGE UP (ref 0–0)
ORGANISM # SPEC MICROSCOPIC CNT: SIGNIFICANT CHANGE UP
ORGANISM # SPEC MICROSCOPIC CNT: SIGNIFICANT CHANGE UP
PH UR: 6 — SIGNIFICANT CHANGE UP (ref 5–8)
PLATELET # BLD AUTO: 125 K/UL — LOW (ref 150–400)
POTASSIUM SERPL-MCNC: 4 MMOL/L — SIGNIFICANT CHANGE UP (ref 3.5–5.3)
POTASSIUM SERPL-SCNC: 4 MMOL/L — SIGNIFICANT CHANGE UP (ref 3.5–5.3)
PROT SERPL-MCNC: 6.8 G/DL — SIGNIFICANT CHANGE UP (ref 6–8.3)
PROT UR-MCNC: NEGATIVE MG/DL — SIGNIFICANT CHANGE UP
RBC # BLD: 4.35 M/UL — SIGNIFICANT CHANGE UP (ref 4.2–5.8)
RBC # FLD: 11.9 % — SIGNIFICANT CHANGE UP (ref 10.3–14.5)
SODIUM SERPL-SCNC: 138 MMOL/L — SIGNIFICANT CHANGE UP (ref 135–145)
SP GR SPEC: 1.01 — SIGNIFICANT CHANGE UP (ref 1–1.03)
SPECIMEN SOURCE: SIGNIFICANT CHANGE UP
TSH SERPL-MCNC: 1.91 UIU/ML — SIGNIFICANT CHANGE UP (ref 0.35–4.94)
UROBILINOGEN FLD QL: 0.2 E.U./DL — SIGNIFICANT CHANGE UP
WBC # BLD: 5.49 K/UL — SIGNIFICANT CHANGE UP (ref 3.8–10.5)
WBC # FLD AUTO: 5.49 K/UL — SIGNIFICANT CHANGE UP (ref 3.8–10.5)

## 2019-07-28 PROCEDURE — 99223 1ST HOSP IP/OBS HIGH 75: CPT | Mod: GC

## 2019-07-28 PROCEDURE — 72131 CT LUMBAR SPINE W/O DYE: CPT | Mod: 26

## 2019-07-28 PROCEDURE — 70450 CT HEAD/BRAIN W/O DYE: CPT | Mod: 26

## 2019-07-28 PROCEDURE — 71045 X-RAY EXAM CHEST 1 VIEW: CPT | Mod: 26

## 2019-07-28 PROCEDURE — 99285 EMERGENCY DEPT VISIT HI MDM: CPT

## 2019-07-28 PROCEDURE — 73521 X-RAY EXAM HIPS BI 2 VIEWS: CPT | Mod: 26

## 2019-07-28 PROCEDURE — 93010 ELECTROCARDIOGRAM REPORT: CPT

## 2019-07-28 RX ORDER — INSULIN LISPRO 100/ML
VIAL (ML) SUBCUTANEOUS
Refills: 0 | Status: DISCONTINUED | OUTPATIENT
Start: 2019-07-28 | End: 2019-07-31

## 2019-07-28 RX ORDER — DEXTROSE 50 % IN WATER 50 %
25 SYRINGE (ML) INTRAVENOUS ONCE
Refills: 0 | Status: DISCONTINUED | OUTPATIENT
Start: 2019-07-28 | End: 2019-07-31

## 2019-07-28 RX ORDER — INSULIN LISPRO 100/ML
4 VIAL (ML) SUBCUTANEOUS
Refills: 0 | Status: DISCONTINUED | OUTPATIENT
Start: 2019-07-28 | End: 2019-07-29

## 2019-07-28 RX ORDER — SODIUM CHLORIDE 9 MG/ML
1000 INJECTION, SOLUTION INTRAVENOUS
Refills: 0 | Status: DISCONTINUED | OUTPATIENT
Start: 2019-07-28 | End: 2019-07-31

## 2019-07-28 RX ORDER — ENOXAPARIN SODIUM 100 MG/ML
40 INJECTION SUBCUTANEOUS EVERY 24 HOURS
Refills: 0 | Status: DISCONTINUED | OUTPATIENT
Start: 2019-07-28 | End: 2019-07-31

## 2019-07-28 RX ORDER — GLUCAGON INJECTION, SOLUTION 0.5 MG/.1ML
1 INJECTION, SOLUTION SUBCUTANEOUS ONCE
Refills: 0 | Status: DISCONTINUED | OUTPATIENT
Start: 2019-07-28 | End: 2019-07-31

## 2019-07-28 RX ORDER — LIDOCAINE 4 G/100G
1 CREAM TOPICAL ONCE
Refills: 0 | Status: COMPLETED | OUTPATIENT
Start: 2019-07-28 | End: 2019-07-28

## 2019-07-28 RX ORDER — DEXTROSE 50 % IN WATER 50 %
12.5 SYRINGE (ML) INTRAVENOUS ONCE
Refills: 0 | Status: DISCONTINUED | OUTPATIENT
Start: 2019-07-28 | End: 2019-07-31

## 2019-07-28 RX ORDER — INSULIN GLARGINE 100 [IU]/ML
30 INJECTION, SOLUTION SUBCUTANEOUS AT BEDTIME
Refills: 0 | Status: DISCONTINUED | OUTPATIENT
Start: 2019-07-28 | End: 2019-07-30

## 2019-07-28 RX ORDER — SODIUM CHLORIDE 9 MG/ML
1000 INJECTION INTRAMUSCULAR; INTRAVENOUS; SUBCUTANEOUS ONCE
Refills: 0 | Status: COMPLETED | OUTPATIENT
Start: 2019-07-28 | End: 2019-07-28

## 2019-07-28 RX ORDER — DEXTROSE 50 % IN WATER 50 %
15 SYRINGE (ML) INTRAVENOUS ONCE
Refills: 0 | Status: DISCONTINUED | OUTPATIENT
Start: 2019-07-28 | End: 2019-07-31

## 2019-07-28 RX ADMIN — Medication 8: at 18:13

## 2019-07-28 RX ADMIN — Medication 8: at 22:53

## 2019-07-28 RX ADMIN — Medication 300 MILLIGRAM(S): at 18:14

## 2019-07-28 RX ADMIN — LIDOCAINE 1 PATCH: 4 CREAM TOPICAL at 20:20

## 2019-07-28 RX ADMIN — LIDOCAINE 1 PATCH: 4 CREAM TOPICAL at 07:14

## 2019-07-28 RX ADMIN — Medication 4 UNIT(S): at 18:13

## 2019-07-28 RX ADMIN — SODIUM CHLORIDE 1000 MILLILITER(S): 9 INJECTION INTRAMUSCULAR; INTRAVENOUS; SUBCUTANEOUS at 07:14

## 2019-07-28 RX ADMIN — INSULIN GLARGINE 30 UNIT(S): 100 INJECTION, SOLUTION SUBCUTANEOUS at 22:53

## 2019-07-28 RX ADMIN — ENOXAPARIN SODIUM 40 MILLIGRAM(S): 100 INJECTION SUBCUTANEOUS at 14:08

## 2019-07-28 RX ADMIN — SODIUM CHLORIDE 1000 MILLILITER(S): 9 INJECTION INTRAMUSCULAR; INTRAVENOUS; SUBCUTANEOUS at 06:30

## 2019-07-28 RX ADMIN — Medication 300 MILLIGRAM(S): at 23:24

## 2019-07-28 NOTE — ED PROVIDER NOTE - ATTENDING CONTRIBUTION TO CARE
74M hx htn, dm, recently discharged after admission for RUE cellulitis (on clinda), s/p fall.  per son pt fell at home.  c/o generalized weakness, c/o lower back pain and knee pain.    gen- nad, unkempt  heent- ncat, clear conj  cv -rrr  lungs -ctab  abd - soft, nt, nd  ext -wwp, no edema  neuro -celaya, unsteady gait  no acute fractures on xray, pending CT L spine.  may require admission if son unable to pick pt up

## 2019-07-28 NOTE — ED ADULT NURSE NOTE - OBJECTIVE STATEMENT
as per triage nurse, pt. was dropped off by son from home, with c/o having fallen from standing position, with resultant low back pain, denies LOC, son states pt. has been unable to stand due to pain, recent hospital admission at this facility due to cellulitis of RUE, Hx DM, no focal neuro deficits appreciated,  palpation of L Spine reveals pain

## 2019-07-28 NOTE — PHYSICAL THERAPY INITIAL EVALUATION ADULT - MODALITIES TREATMENT COMMENTS
FIM locomotion 1; FIM stairs TBA; Short Term Goals: Bed mobility supine to sit/sit to supine with stand-by assist; Transfers sit to stand/stand to sit to close supervision with RW; Gait x 100 feet with RW w/o LOB or LE buckling; Stairs x 6 steps with SC and handrail; Outcome Summary: Tolerated evaluation fairly, however demonstrates decreased LE strength and posterior R lumbosacral pain with movement. Requires assistance for all gross mobility and is highly unsteady ambulating even with rolling walker, posing fall risk. Plan to progress ambulation as tolerated and assess stair negotiation next session. Limited by pain, deconditioning, impaired dynamic balance contributing to unsteady gait and transfers, and decreased functional endurance, hindering functional mobility. Patient would benefit from continued acute inpatient skilled PT services to improve all mobility and assist with return to prior level of function. Returned to semi-Mora's position in bed, left as received, in NAD.

## 2019-07-28 NOTE — PHYSICAL THERAPY INITIAL EVALUATION ADULT - ADL SKILLS, REHAB EVAL
needs assistance from son for IADLs such as shopping, cooking, cleaning, bathing and dressing as of late/needed assist

## 2019-07-28 NOTE — H&P ADULT - PROBLEM SELECTOR PLAN 2
Patient has a history or RA in his knees  - Possible contributor to fall  - F/u PT recommendations  - Will continue to plan for discharge to [facility] if other etiologies of fall are negative

## 2019-07-28 NOTE — PHYSICAL THERAPY INITIAL EVALUATION ADULT - PATIENT/FAMILY/SIGNIFICANT OTHER GOALS STATEMENT, PT EVAL
Patient is willing and motivated to participate in physical therapy, however states he has no strength in his legs

## 2019-07-28 NOTE — PHYSICAL THERAPY INITIAL EVALUATION ADULT - IMPAIRMENTS FOUND, PT EVAL
aerobic capacity/endurance/gross motor/gait, locomotion, and balance/pain limiting function/muscle strength

## 2019-07-28 NOTE — H&P ADULT - NSHPREVIEWOFSYSTEMS_GEN_ALL_CORE
General:	Pt found resting in bed, incontinent, in no acute pain or distress  Skin/Breast: Scar over right shoulder secondary to cellulitis, no pain or discomfort  Ophthalmologic: No changes in visual acuity  Respiratory and Thorax: no shortness of breath, or dyspnea on exertion, no pain on respiration 	  Cardiovascular: Denies chest pain or palpitations, endorses syncopal episodes with peripheral weakness, denies past cardiac history  Gastrointestinal: No nausea or vomiting, no recent weight gain or loss, no diarrhea or constipation  Genitourinary: Incontinence for several months  Musculoskeletal: No weakness noted   Neurological: Equal strength bilaterally in all extremities  Psychiatric: Mood and affect appropriate, AAOx2	  Endocrine: Reports poor control of his diabetes  Allergic/Immunologic: Denies fever, or pain over cellulitis site

## 2019-07-28 NOTE — PHYSICAL THERAPY INITIAL EVALUATION ADULT - ACTIVE RANGE OF MOTION EXAMINATION, REHAB EVAL
Right LE Active ROM was WFL (within functional limits)/R hip flexion limited by pain/Left LE Active ROM was WFL (within functional limits)/bilateral upper extremity Active ROM was WFL (within functional limits)

## 2019-07-28 NOTE — PHYSICAL THERAPY INITIAL EVALUATION ADULT - MANUAL MUSCLE TESTING RESULTS, REHAB EVAL
Patient's muscle strength grossly at least 3+/5 based on functional assessment of bed mobility, transfers and ambulation

## 2019-07-28 NOTE — PHYSICAL THERAPY INITIAL EVALUATION ADULT - ADDITIONAL COMMENTS
Patient lives with his son in a private house with steps to enter. Patient's son works during the day leaving patient alone with no home health assistance endorsed. Endorses multiple falls over the past few months and states his weakness is getting worse.

## 2019-07-28 NOTE — H&P ADULT - PROBLEM SELECTOR PLAN 4
- A1c of 11  - Patient started on sliding scale insulin - A1c of 11  - Patient started on sliding scale insulin, will resume home dose of lantus + lispro

## 2019-07-28 NOTE — H&P ADULT - PROBLEM SELECTOR PLAN 1
Pt reports history of recurant falls, possibly mechanical. Pt denies syncopal prodrome  - CT conducted and reveals no evidence of hemorrhage or NPH  - CT lumbar spine revealed no evidence of spinal stenosis or lumbar fracture  - PT assessed patient in ED, recommended MILTON  - f/u ECG  - F/u RPR, TSH, B12  - Possible diabetic neuropathy, last A1c 11 Pt reports history of recurrent falls, possibly mechanical. Pt denies syncopal prodrome  - CT conducted and reveals no evidence of hemorrhage or NPH  - CT lumbar spine revealed no evidence of spinal stenosis or lumbar fracture  - PT assessed patient in ED, recommended MILTON  - f/u ECG  - F/u RPR, TSH, B12  - Possible diabetic neuropathy, last A1c 11
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
## 2019-07-28 NOTE — H&P ADULT - ATTENDING COMMENTS
Pt seen and examined at bedside, agree with above. Presents 1 day s/p discharge for cellulitis now with mechanical fall, upon further history taking noted to have frequent falls and leg weakness. No hx concerning for syncope, previous and current imaging negative or spinal cord stenosis or cauda equina. Labs significant for poorly controlled diabetes. Cellulitis appears resolved    -Falls: Mechanical in nature, may be 2/2 OA + diabetic neuropathy, will check orthostatics as he may have component of autonomic dysfunction, F/U PT eval, TSH, RPR, B12/folate  - DM: As above, poorly controlled. Resume insulin dosing calculated during last admission  - HTN: Currently normotensive, will continue to monitor. Would avoid relative hypotension in the elderly, especially in the setting of frequent falls. Pt seen and examined at bedside, agree with above. Presents 1 day s/p discharge for cellulitis now with mechanical fall, upon further history taking noted to have frequent falls and leg weakness. No hx concerning for syncope, previous and current imaging negative or spinal cord stenosis or cauda equina. Labs significant for poorly controlled diabetes. Cellulitis appears resolved    -Falls: Mechanical in nature, may be 2/2 OA + diabetic neuropathy, will check orthostatics as he may have component of autonomic dysfunction, F/U PT eval, TSH, RPR, B12/folate  - DM: As above, poorly controlled. Resume insulin dosing calculated during last admission  - HTN: Currently normotensive, will continue to monitor. Would avoid relative hypotension in the elderly, especially in the setting of frequent falls.  - Cellulitis: Resolved, complete prescribed PO abx course (5 more days of clinda)

## 2019-07-28 NOTE — ED ADULT NURSE REASSESSMENT NOTE - NS ED NURSE REASSESS COMMENT FT1
tried to ambulate with Melissa NAQVI, as per pt he is having trouble with ambulation without any assistance. for further evaluation.

## 2019-07-28 NOTE — H&P ADULT - PROBLEM SELECTOR PLAN 6
F - Not indicated  E - Replete if K < 4 or Mg < 2  N - Patient started on consistant carbohydrate diet

## 2019-07-28 NOTE — H&P ADULT - NSHPPOAURINARYCATHETER_GEN_ALL_CORE
Osteolytic lesion SBO (small bowel obstruction) Osteolytic lesion Osteolytic lesion Osteolytic lesion Osteolytic lesion Osteolytic lesion Osteolytic lesion no

## 2019-07-28 NOTE — ED ADULT NURSE REASSESSMENT NOTE - NS ED NURSE REASSESS COMMENT FT1
urine was obtained and sent, nad at present, awaiting results/dispo., UTD on poc, with understanding verbalized

## 2019-07-28 NOTE — ED PROVIDER NOTE - MUSCULOSKELETAL, MLM
Spine appears normal, diffuse tenderness to lumbar region, no ecchymosis, no deformity, no decreased range of motion, b/l knee tenderness, no effusion, b/l extremities symmetrical, distal pulses present, motor strength 5/5

## 2019-07-28 NOTE — ED ADULT NURSE REASSESSMENT NOTE - NS ED NURSE REASSESS COMMENT FT1
dressing to RT deltoid region changed, packing present at center of wound, DSD: 4x4's/Tegaderm x2, kavita. well, Eval. per Anthony

## 2019-07-28 NOTE — ED ADULT NURSE REASSESSMENT NOTE - NS ED NURSE REASSESS COMMENT FT1
DRISS Torres spoke with pt's son, who states wife will be home, pt. to be d/c home per ambulette,  notified, pt. utd, with understanding verbalized

## 2019-07-28 NOTE — H&P ADULT - NSHPLABSRESULTS_GEN_ALL_CORE
LABS:              13.1   5.49  )-----------( 125      ( 2019 04:20 )             39.1         138  |  104  |  14  ----------------------------<  182<H>  4.0   |  23  |  0.62    Ca    8.6      2019 04:20  Mg     2.0         TPro  6.8  /  Alb  3.3  /  TBili  0.6  /  DBili  x   /  AST  14  /  ALT  13  /  AlkPhos  63      Urinalysis Basic - ( 2019 05:37 )  Color: Yellow / Appearance: Clear / S.015 / pH: x  Gluc: x / Ketone: 40 mg/dL  / Bili: Negative / Urobili: 0.2 E.U./dL   Blood: x / Protein: NEGATIVE mg/dL / Nitrite: NEGATIVE   Leuk Esterase: NEGATIVE / RBC: x / WBC x   Sq Epi: x / Non Sq Epi: x / Bacteria: x    Lactate, Blood: 1.1 mmoL/L ( @ 04:22)      RADIOLOGY, EKG & ADDITIONAL TESTS: Reviewed.

## 2019-07-28 NOTE — H&P ADULT - ASSESSMENT
75 y/o M presents following a fall while walking. Pt reports the fall occurred due to weakness and sudden onset of dizziness, however his story is inconsistent The patient also has had frequent incontinence for the past several months. CT head was conducted in the ED, revealing no intracranial hemorrhage or infarct, no evidence of NPH. PT evaluated the patient in the ED and recommended MILTON.

## 2019-07-28 NOTE — ED PROVIDER NOTE - CLINICAL SUMMARY MEDICAL DECISION MAKING FREE TEXT BOX
73 yo male with h/o HTN, DM, recently ( 07/24-07/27) admitted to [facility] for fever/cellulitis of his right arm, treated and released home, came back to ER c/o fall at home and inability to walk, c/o generalized weakness. pt poorly kept, urinated on himself, poor historian. plan : labs, head ct and xray of pelvis/hip, re-evaluate.

## 2019-07-28 NOTE — H&P ADULT - NSHPPHYSICALEXAM_GEN_ALL_CORE
VITAL SIGNS:  T(C): 37 (07-28-19 @ 09:31), Max: 37 (07-28-19 @ 09:31)  T(F): 98.6 (07-28-19 @ 09:31), Max: 98.6 (07-28-19 @ 09:31)  HR: 95 (07-28-19 @ 09:31) (70 - 96)  BP: 120/67 (07-28-19 @ 09:31) (114/70 - 144/77)  BP(mean): --  RR: 16 (07-28-19 @ 09:31) (16 - 19)  SpO2: 99% (07-28-19 @ 09:31) (97% - 99%)    PHYSICAL EXAM:  Constitutional: WDWN resting comfortably in bed; NAD, noted incontinence   Head: NC/AT  Eyes: PERRL, EOMI, anicteric sclera  ENT: no nasal discharge; uvula midline, no oropharyngeal erythema or exudates; MMM  Neck: supple; no JVD or thyromegaly  Respiratory: CTA B/L; no W/R/R, no retractions  Cardiac: +S1/S2; RRR; no M/R/G; PMI non-displaced  Gastrointestinal: abdomen soft, NT/ND; no rebound or guarding; +BSx4  Extremities: WWP, no clubbing or cyanosis; no peripheral edema, scar over right shoulder from previous cellulitis, no erythema, nonpainful, no discharge  Musculoskeletal: NROM x4; no joint swelling, tenderness or erythema  Dermatologic: skin warm, dry and intact; wound over right shoulder  Neurologic: AAOx2, not oriented to time; CNII-XII grossly intact; no focal deficits  Psychiatric: affect and characteristics of appearance, verbalizations, behaviors are appropriate

## 2019-07-28 NOTE — ED PROVIDER NOTE - OBJECTIVE STATEMENT
73 yo male with h/o DM,  HTN, admitted to [facility] on 07/24 and discharge on 07/27, came back for evaluation because he fell at home. Pt was treated for right upper arm cellulitis.   Pt c/o not feeling well, c/o feeling generally weak, c/o lower back and b/l knees pain. Pt is poor historian, has been urinated on himself and appears poorly kept. Denies SOB, CP, abdominal pain, n/v.

## 2019-07-28 NOTE — PHYSICAL THERAPY INITIAL EVALUATION ADULT - GENERAL OBSERVATIONS, REHAB EVAL
Received semi-Mora's position in bed on room air, no lines, in no apparent distress. Patient appears to be resting comfortably in bed
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
## 2019-07-28 NOTE — ED ADULT NURSE REASSESSMENT NOTE - NS ED NURSE REASSESS COMMENT FT1
pt. malvin from CT, nad or change, IVF's initiated, awaiting CT results/dispo., pt. utd, with understanding verbalized

## 2019-07-28 NOTE — H&P ADULT - PROBLEM SELECTOR PLAN 5
Pt reports history of HTN  - Pressure normal on exam, continue to track with q8hr vitals  - No intervention needed at this time, consider starting ACEi due to comorbid DM if pt becomes hypertensive

## 2019-07-28 NOTE — ED PROVIDER NOTE - PROGRESS NOTE DETAILS
no acute findings on head CT and pelvis/b/l hip xray and CXR, normal labs. pending lumbar CT, as pt c/o lower back pain.   Spoke to his son who states that pt came back from the hospital very weak, accidentally fell at home, had no LOC, but couldn't get up and walk, c/o lower back pain. Patient was assessed by PT for ambulation with a walker however patient failed PT assessment. Patient walks with unsteady gait and is home alone due to family working. He is a unsafe discharge and a fall risk. Recommend short term rehab for physical therapy and further  management. Probable HHA.

## 2019-07-28 NOTE — ED PROVIDER NOTE - CARE PLAN
Principal Discharge DX:	Fall at home Principal Discharge DX:	Fall  Secondary Diagnosis:	Unsteady gait  Secondary Diagnosis:	Risk for falls

## 2019-07-28 NOTE — ED ADULT NURSE REASSESSMENT NOTE - NS ED NURSE REASSESS COMMENT FT1
Patient care and endorsement received from previous RN. Patient a/oX 3, w/ unsteady gait, no pain or other symptom complaint, Lidocaine patch in place to back, vital signs stable.  For admit 4 Uris ;  endorsement report and care received by JOSE MARIA Velez.  Transport pending.

## 2019-07-28 NOTE — ED ADULT NURSE REASSESSMENT NOTE - NS ED NURSE REASSESS COMMENT FT1
assessment and interventions as noted, wet/urine soaked clothing changed, placed in gown, provided with warm blankets and pillow for comfort, awaiting urine sample

## 2019-07-29 ENCOUNTER — TRANSCRIPTION ENCOUNTER (OUTPATIENT)
Age: 74
End: 2019-07-29

## 2019-07-29 DIAGNOSIS — L03.90 CELLULITIS, UNSPECIFIED: ICD-10-CM

## 2019-07-29 DIAGNOSIS — Z91.89 OTHER SPECIFIED PERSONAL RISK FACTORS, NOT ELSEWHERE CLASSIFIED: ICD-10-CM

## 2019-07-29 LAB
ANION GAP SERPL CALC-SCNC: 12 MMOL/L — SIGNIFICANT CHANGE UP (ref 5–17)
BUN SERPL-MCNC: 15 MG/DL — SIGNIFICANT CHANGE UP (ref 7–23)
CALCIUM SERPL-MCNC: 8.7 MG/DL — SIGNIFICANT CHANGE UP (ref 8.4–10.5)
CHLORIDE SERPL-SCNC: 106 MMOL/L — SIGNIFICANT CHANGE UP (ref 96–108)
CO2 SERPL-SCNC: 25 MMOL/L — SIGNIFICANT CHANGE UP (ref 22–31)
CREAT SERPL-MCNC: 0.55 MG/DL — SIGNIFICANT CHANGE UP (ref 0.5–1.3)
CULTURE RESULTS: NO GROWTH — SIGNIFICANT CHANGE UP
CULTURE RESULTS: SIGNIFICANT CHANGE UP
CULTURE RESULTS: SIGNIFICANT CHANGE UP
GLUCOSE BLDC GLUCOMTR-MCNC: 176 MG/DL — HIGH (ref 70–99)
GLUCOSE BLDC GLUCOMTR-MCNC: 206 MG/DL — HIGH (ref 70–99)
GLUCOSE BLDC GLUCOMTR-MCNC: 225 MG/DL — HIGH (ref 70–99)
GLUCOSE BLDC GLUCOMTR-MCNC: 382 MG/DL — HIGH (ref 70–99)
GLUCOSE SERPL-MCNC: 213 MG/DL — HIGH (ref 70–99)
HCT VFR BLD CALC: 41.7 % — SIGNIFICANT CHANGE UP (ref 39–50)
HGB BLD-MCNC: 14 G/DL — SIGNIFICANT CHANGE UP (ref 13–17)
MAGNESIUM SERPL-MCNC: 2 MG/DL — SIGNIFICANT CHANGE UP (ref 1.6–2.6)
MCHC RBC-ENTMCNC: 30.4 PG — SIGNIFICANT CHANGE UP (ref 27–34)
MCHC RBC-ENTMCNC: 33.6 GM/DL — SIGNIFICANT CHANGE UP (ref 32–36)
MCV RBC AUTO: 90.7 FL — SIGNIFICANT CHANGE UP (ref 80–100)
NRBC # BLD: 0 /100 WBCS — SIGNIFICANT CHANGE UP (ref 0–0)
PLATELET # BLD AUTO: 152 K/UL — SIGNIFICANT CHANGE UP (ref 150–400)
POTASSIUM SERPL-MCNC: 4 MMOL/L — SIGNIFICANT CHANGE UP (ref 3.5–5.3)
POTASSIUM SERPL-SCNC: 4 MMOL/L — SIGNIFICANT CHANGE UP (ref 3.5–5.3)
RBC # BLD: 4.6 M/UL — SIGNIFICANT CHANGE UP (ref 4.2–5.8)
RBC # FLD: 11.9 % — SIGNIFICANT CHANGE UP (ref 10.3–14.5)
SODIUM SERPL-SCNC: 143 MMOL/L — SIGNIFICANT CHANGE UP (ref 135–145)
SPECIMEN SOURCE: SIGNIFICANT CHANGE UP
T PALLIDUM AB TITR SER: NEGATIVE — SIGNIFICANT CHANGE UP
VIT B12 SERPL-MCNC: 1014 PG/ML — SIGNIFICANT CHANGE UP (ref 232–1245)
WBC # BLD: 3.85 K/UL — SIGNIFICANT CHANGE UP (ref 3.8–10.5)
WBC # FLD AUTO: 3.85 K/UL — SIGNIFICANT CHANGE UP (ref 3.8–10.5)

## 2019-07-29 PROCEDURE — 99233 SBSQ HOSP IP/OBS HIGH 50: CPT | Mod: GC

## 2019-07-29 RX ORDER — ACETAMINOPHEN 500 MG
650 TABLET ORAL EVERY 6 HOURS
Refills: 0 | Status: DISCONTINUED | OUTPATIENT
Start: 2019-07-29 | End: 2019-07-31

## 2019-07-29 RX ORDER — INSULIN LISPRO 100/ML
6 VIAL (ML) SUBCUTANEOUS
Refills: 0 | Status: DISCONTINUED | OUTPATIENT
Start: 2019-07-29 | End: 2019-07-30

## 2019-07-29 RX ORDER — LISINOPRIL 2.5 MG/1
5 TABLET ORAL DAILY
Refills: 0 | Status: DISCONTINUED | OUTPATIENT
Start: 2019-07-29 | End: 2019-07-31

## 2019-07-29 RX ADMIN — ENOXAPARIN SODIUM 40 MILLIGRAM(S): 100 INJECTION SUBCUTANEOUS at 12:15

## 2019-07-29 RX ADMIN — Medication 4 UNIT(S): at 08:44

## 2019-07-29 RX ADMIN — Medication 4: at 12:15

## 2019-07-29 RX ADMIN — Medication 10: at 22:35

## 2019-07-29 RX ADMIN — Medication 300 MILLIGRAM(S): at 17:39

## 2019-07-29 RX ADMIN — Medication 300 MILLIGRAM(S): at 05:32

## 2019-07-29 RX ADMIN — Medication 6 UNIT(S): at 17:39

## 2019-07-29 RX ADMIN — Medication 4 UNIT(S): at 12:15

## 2019-07-29 RX ADMIN — LISINOPRIL 5 MILLIGRAM(S): 2.5 TABLET ORAL at 17:39

## 2019-07-29 RX ADMIN — Medication 4: at 17:39

## 2019-07-29 RX ADMIN — Medication 300 MILLIGRAM(S): at 12:15

## 2019-07-29 RX ADMIN — Medication 300 MILLIGRAM(S): at 23:21

## 2019-07-29 RX ADMIN — Medication 2: at 08:44

## 2019-07-29 RX ADMIN — INSULIN GLARGINE 30 UNIT(S): 100 INJECTION, SOLUTION SUBCUTANEOUS at 22:35

## 2019-07-29 NOTE — DISCHARGE NOTE PROVIDER - NSDCCPCAREPLAN_GEN_ALL_CORE_FT
PRINCIPAL DISCHARGE DIAGNOSIS  Diagnosis: Fall  Assessment and Plan of Treatment: You were admitted on July 28, 2019 after a recent fall while walking near your home. During this fall you did not lose consciousness or experienced trauma to your head. During your hospital visit we performed a CT scan of your head that showed no acute hemorrhage or infarction. Physical therapy worked with you during your hospital stay. You were able to walk with assitance using a walker. Physical therapy recommended a walker for assistance walking. Urinanalysis did not show any evidence of infection. To avoid additional falls, you will go to a subacute rehab facility. It is important that you continue to follow physical therapy.      SECONDARY DISCHARGE DIAGNOSES  Diagnosis: Diabetes mellitus  Assessment and Plan of Treatment: During your hospital visit, your blood sugar was closely monitored. Your hospital visit was complicated by elevated blood sugars. You were given Lantus and at bedtime and Lispro before meals to keep your sugar levels down. This helped to decrease your blood sugars to a safe range. It is important that you take your daily home medications of diabetes to avoid elevated levels of sugar that my be detrimental to your health. Please see your primary care doctor within 1-2 weeks for a follow up.    Diagnosis: Cellulitis  Assessment and Plan of Treatment: You were recently diagnosed with right upper arm cellulitis on a prior admission. Prior to discharge from the hospital, you were switched to oral antibiotics    Diagnosis: Unsteady gait  Assessment and Plan of Treatment: PRINCIPAL DISCHARGE DIAGNOSIS  Diagnosis: Fall  Assessment and Plan of Treatment: You were admitted on July 28, 2019 after a recent fall while walking near your home. During this fall you did not lose consciousness or experienced trauma to your head. During your hospital visit we performed a CT scan of your head that showed no acute hemorrhage or infarction. Physical therapy worked with you during your hospital stay. You were able to walk with assitance using a walker. Physical therapy recommended a walker for assistance walking. Urinanalysis did not show any evidence of infection. To avoid additional falls, you will go to a subacute rehab facility. It is important that you continue to follow physical therapy.      SECONDARY DISCHARGE DIAGNOSES  Diagnosis: Diabetes mellitus  Assessment and Plan of Treatment: During your hospital visit, your blood sugar was closely monitored. Your hospital visit was complicated by elevated blood sugars. You were given Lantus and at bedtime and Lispro before meals to keep your sugar levels down. This helped to decrease your blood sugars to a safe range. It is important that you take your daily home medications of diabetes to avoid elevated levels of sugar that my be detrimental to your health. Please see your primary care doctor within 1-2 weeks for a follow up.    Diagnosis: Cellulitis  Assessment and Plan of Treatment: You were recently diagnosed with right upper arm cellulitis on a prior admission. Prior to discharge from the hospital, you were switched to oral antibiotics PRINCIPAL DISCHARGE DIAGNOSIS  Diagnosis: Fall  Assessment and Plan of Treatment: You were admitted on July 28, 2019 after a recent fall while walking near your home. During this fall you did not lose consciousness or experienced trauma to your head. During your hospital visit we performed a CT scan of your head that showed no acute hemorrhage or infarction. Physical therapy worked with you during your hospital stay. You were able to walk with assitance using a walker. Physical therapy recommended a walker for assistance walking. Urinanalysis did not show any evidence of infection. To avoid additional falls, you will go to a subacute rehab facility. It is important that you continue to follow physical therapy.      SECONDARY DISCHARGE DIAGNOSES  Diagnosis: Diabetes mellitus  Assessment and Plan of Treatment: During your hospital visit, your blood sugar was closely monitored. Your hospital visit was complicated by elevated blood sugars. You were given Lantus and at bedtime and Lispro before meals to keep your sugar levels down. This helped to decrease your blood sugars to a safe range. It is important that you take your daily home medications of diabetes to avoid elevated levels of sugar that my be detrimental to your health. Please see your primary care doctor within 1-2 weeks for a follow up.    Diagnosis: HTN (hypertension)  Assessment and Plan of Treatment: Please take your blood pressure medications as directed. You medications were increased as you had high blood presures during your stay.    Diagnosis: Cellulitis  Assessment and Plan of Treatment: You were recently diagnosed with right upper arm cellulitis on a prior admission. You were continued on oral antibiotics. Please continue to take your antibiotics as directed.
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
## 2019-07-29 NOTE — CONSULT NOTE ADULT - ASSESSMENT
75 y/o M presents following a fall while walking. Pt reports the fall occurred due to weakness and sudden onset of dizziness, however his story is inconsistent The patient also has had frequent incontinence for the past several months. CT head was conducted in the ED, revealing no intracranial hemorrhage or infarct, no evidence of NPH. PT evaluated the patient in the ED and recommended [facility].    Problem/Plan - 1:  ·  Problem: Fall.  Plan: Pt reports history of recurrent falls, possibly mechanical. Pt denies syncopal prodrome  - CT conducted and reveals no evidence of hemorrhage or NPH  - CT lumbar spine revealed no evidence of spinal stenosis or lumbar fracture  - PT assessed patient in ED, recommended [facility]  - f/u ECG  - F/u RPR, TSH, B12  - Possible diabetic neuropathy, last A1c 11.     Problem/Plan - 2:  ·  Problem: Unsteady gait.  Plan: Patient has a history or RA in his knees  - Possible contributor to fall  - F/u PT recommendations  - Will continue to plan for discharge to [facility] if other etiologies of fall are negative.     Problem/Plan - 3:  ·  Problem: Risk for falls.  Plan: Pt has had recurring falls   - Fall risk precautions  - F/u PT recommendations.     Problem/Plan - 4:  ·  Problem: Type 2 diabetes mellitus.  Plan: - A1c of 11  - Patient started on sliding scale insulin, will resume home dose of lantus + lispro.     Problem/Plan - 5:  ·  Problem: HTN (hypertension).  Plan: Pt reports history of HTN  - Pressure normal on exam, continue to track with q8hr vitals  - No intervention needed at this time, consider starting ACEi due to comorbid DM if pt becomes hypertensive.     Problem/Plan - 6:  Problem: Nutrition, metabolism, and development symptoms. Plan: F - Not indicated  E - Replete if K < 4 or Mg < 2  N - Patient started on consistant carbohydrate diet.    Problem/Plan - 7:  ·  Problem: Prophylactic measure.  Plan: DVT ppx - Lovenox 40   GI ppx - not indicated.

## 2019-07-29 NOTE — DISCHARGE NOTE PROVIDER - NSDCFUADDAPPT_GEN_ALL_CORE_FT
Please follow up with your primary care provider. Your doctor only takes walk-in visits.    The office is open from 9-5pm every day except Tuesday and Sunday.

## 2019-07-29 NOTE — PROGRESS NOTE ADULT - SUBJECTIVE AND OBJECTIVE BOX
OVERNIGHT EVENTS: YISSEL, CT head, CT thoracic spine CT pelvis done overnight    SUBJECTIVE / INTERVAL HPI: Patient seen and examined at bedside. Only complains of mild pain in head at occipital region, denies head trauma. Denies f/c, n/v chest pain, SOB, abdominal pain, diarrhea, constipation, melena, hematochezia, hematuria, dysuria    VITAL SIGNS:  Vital Signs Last 24 Hrs  T(C): 36.7 (2019 15:49), Max: 36.7 (2019 15:49)  T(F): 98.1 (2019 15:49), Max: 98.1 (2019 15:49)  HR: 64 (2019 15:49) (63 - 71)  BP: 158/92 (2019 15:49) (131/72 - 167/76)  BP(mean): --  RR: 18 (2019 15:49) (16 - 18)  SpO2: 98% (2019 15:49) (96% - 99%)      19 @ 07:01  -  19 @ 07:00  --------------------------------------------------------  IN: 0 mL / OUT: 1200 mL / NET: -1200 mL        PHYSICAL EXAM:  General: NAD, Laying comfortably in bed  HEENT: NC/AT, anicteric sclera, MMM, tender to palpation on occipital head but no lacerations or effusions  Neck: supple  Cardiovascular: +S1/S2, RRR, No murmurs, rubs, gallops  Respiratory: CTA B/L, no wheezing or crackles  Gastrointestinal: soft, NT/ND, +BSx4  Extremities: WWP, no edema, clubbing or cyanosis, R arm dressing for previous cellulitis I&D, is dry and not leaking  Vascular: 2+ radial  Neurological: AAOx3, no focal deficits    MEDICATIONS  (STANDING):  clindamycin   Capsule 300 milliGRAM(s) Oral every 6 hours  dextrose 5%. 1000 milliLiter(s) (50 mL/Hr) IV Continuous <Continuous>  dextrose 50% Injectable 12.5 Gram(s) IV Push once  dextrose 50% Injectable 25 Gram(s) IV Push once  dextrose 50% Injectable 25 Gram(s) IV Push once  enoxaparin Injectable 40 milliGRAM(s) SubCutaneous every 24 hours  insulin glargine Injectable (LANTUS) 30 Unit(s) SubCutaneous at bedtime  insulin lispro (HumaLOG) corrective regimen sliding scale   SubCutaneous Before meals and at bedtime  insulin lispro Injectable (HumaLOG) 6 Unit(s) SubCutaneous three times a day before meals    MEDICATIONS  (PRN):  acetaminophen   Tablet .. 650 milliGRAM(s) Oral every 6 hours PRN Moderate Pain (4 - 6)  dextrose 40% Gel 15 Gram(s) Oral once PRN Blood Glucose LESS THAN 70 milliGRAM(s)/deciliter  glucagon  Injectable 1 milliGRAM(s) IntraMuscular once PRN Glucose LESS THAN 70 milligrams/deciliter    Allergies    No Known Allergies    Intolerances        LABS:                        14.0   3.85  )-----------( 152      ( 2019 08:40 )             41.7         143  |  106  |  15  ----------------------------<  213<H>  4.0   |  25  |  0.55    Ca    8.7      2019 08:40  Mg     2.0         TPro  6.8  /  Alb  3.3  /  TBili  0.6  /  DBili  x   /  AST  14  /  ALT  13  /  AlkPhos  63        Urinalysis Basic - ( 2019 05:37 )    Color: Yellow / Appearance: Clear / S.015 / pH: x  Gluc: x / Ketone: 40 mg/dL  / Bili: Negative / Urobili: 0.2 E.U./dL   Blood: x / Protein: NEGATIVE mg/dL / Nitrite: NEGATIVE   Leuk Esterase: NEGATIVE / RBC: x / WBC x   Sq Epi: x / Non Sq Epi: x / Bacteria: x      CAPILLARY BLOOD GLUCOSE      POCT Blood Glucose.: 206 mg/dL (2019 12:00)      < from: CT Head No Cont (19 @ 05:11) >  IMPRESSION:   No acute intracranial hemorrhage or calvarial fracture.    < end of copied text >      < from: CT Lumbar Spine No Cont (19 @ 06:53) >  IMPRESSION:  No lumbar spine fracture or dislocation.    < end of copied text >    < from: Xray Hip w/ Pelvis 2 Views, Bilateral (19 @ 04:49) >  Impression: No evidence of acute fracture    < end of copied text >

## 2019-07-29 NOTE — PROGRESS NOTE ADULT - PROBLEM SELECTOR PLAN 3
hgba 11  -c/w sliding scale, monitor FS hgba1c 11, uncontrolled DM, noncompliant with home medications  -c/w sliding scale, monitor FS

## 2019-07-29 NOTE — DISCHARGE NOTE PROVIDER - NSDCFUADDINST_GEN_ALL_CORE_FT
Wound care instructions:    Wet to dry dressing of R upper extremity wound daily:  -please wet 2x2 gauze and pack wound daily  -can place dry dressing over wound after and tape in place  -patient can shower without dressing in place and allow water to run over the wound  -please remove gauze packing and change every day Wound care instructions:    Wet to dry dressing of R upper extremity wound daily:  -please wet 2x2 gauze with saline and pack wound daily  -can place dry 4x4 dressing over wound afterwards and tape in place  -patient can shower without dressing in place and allow water to run over the wound  -please remove gauze packing and change every day
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
## 2019-07-29 NOTE — PROGRESS NOTE ADULT - SUBJECTIVE AND OBJECTIVE BOX
Pt seen and examined by me at bedside   spoke to patient in mandarin, stated fell at home    VSS  comfortable   NAD  +RUE dressing-old blood     labs reviewed   wound cx +MSSA sens to clinda

## 2019-07-29 NOTE — PROGRESS NOTE ADULT - PROBLEM SELECTOR PLAN 2
recent admission to the hospital for RUE cellulitis s/p I&D on clindamycin PO  -c/w clindamycin  -change dressing recent admission to the hospital for RUE cellulitis s/p I&D on clindamycin PO  -c/w clindamycin  -will change dressing

## 2019-07-30 LAB
GLUCOSE BLDC GLUCOMTR-MCNC: 141 MG/DL — HIGH (ref 70–99)
GLUCOSE BLDC GLUCOMTR-MCNC: 153 MG/DL — HIGH (ref 70–99)
GLUCOSE BLDC GLUCOMTR-MCNC: 175 MG/DL — HIGH (ref 70–99)
GLUCOSE BLDC GLUCOMTR-MCNC: 264 MG/DL — HIGH (ref 70–99)
GLUCOSE BLDC GLUCOMTR-MCNC: 347 MG/DL — HIGH (ref 70–99)

## 2019-07-30 PROCEDURE — 99233 SBSQ HOSP IP/OBS HIGH 50: CPT | Mod: GC

## 2019-07-30 RX ORDER — INSULIN GLARGINE 100 [IU]/ML
35 INJECTION, SOLUTION SUBCUTANEOUS AT BEDTIME
Refills: 0 | Status: DISCONTINUED | OUTPATIENT
Start: 2019-07-30 | End: 2019-07-31

## 2019-07-30 RX ORDER — INSULIN LISPRO 100/ML
10 VIAL (ML) SUBCUTANEOUS
Refills: 0 | Status: DISCONTINUED | OUTPATIENT
Start: 2019-07-30 | End: 2019-07-31

## 2019-07-30 RX ADMIN — Medication 2: at 12:16

## 2019-07-30 RX ADMIN — Medication 6 UNIT(S): at 09:06

## 2019-07-30 RX ADMIN — Medication 10 UNIT(S): at 12:16

## 2019-07-30 RX ADMIN — Medication 300 MILLIGRAM(S): at 17:52

## 2019-07-30 RX ADMIN — ENOXAPARIN SODIUM 40 MILLIGRAM(S): 100 INJECTION SUBCUTANEOUS at 14:58

## 2019-07-30 RX ADMIN — INSULIN GLARGINE 35 UNIT(S): 100 INJECTION, SOLUTION SUBCUTANEOUS at 21:41

## 2019-07-30 RX ADMIN — Medication 6: at 21:40

## 2019-07-30 RX ADMIN — Medication 8: at 09:06

## 2019-07-30 RX ADMIN — Medication 300 MILLIGRAM(S): at 12:13

## 2019-07-30 RX ADMIN — Medication 10 UNIT(S): at 17:51

## 2019-07-30 RX ADMIN — LISINOPRIL 5 MILLIGRAM(S): 2.5 TABLET ORAL at 05:56

## 2019-07-30 RX ADMIN — Medication 300 MILLIGRAM(S): at 05:56

## 2019-07-30 NOTE — PROGRESS NOTE ADULT - PROBLEM SELECTOR PLAN 1
appreciate PT Divya
patient presenting s/p mechanical fall, no LOC, with history of unsteady gait per daughter in law, CT head negative for acute hemorrhage/infarction, CT thoracic/pelvic negative for fractures.   -fall risk precautions  -PT recommends MILTON, will be d/c on MILTON placement
presenting s/p mechanical fall, no LOC, with history of unsteady gait per daughter in law, CT head negative for acute hemorrhage/infarction, CT thoracic/pelvic negative for fractures. Negative orthostatics  -fall risk precautions  -PT recommends MILTON, will be d/c on MILTON placement.

## 2019-07-30 NOTE — PROGRESS NOTE ADULT - ATTENDING COMMENTS
Pt seen and examined by me at beside earlier in AM. Agree with above with additions,   still complain of dizziness, negative orthostatic, no nystagmus on exam, maybe 2/2 deconditioning.   VSS, exam as above  labs reviewed     a/p;  1. Fall likely deconditioning, PT rec MILTON  2. DMII-FS uncontrolled, adjust insulin accordingly  3. RUE cellulitis/abscess, s/p I&D, c/w clinda for total of 7days

## 2019-07-30 NOTE — PROGRESS NOTE ADULT - SUBJECTIVE AND OBJECTIVE BOX
OVERNIGHT EVENTS: FS blood glucose in 300s, no other acute events overnight    SUBJECTIVE / INTERVAL HPI: Patient seen and examined at bedside. Dressing was changed this AM. Patient complains of dizziness, most significant on standing. Denies f/c, n/v, HA, chest pain, SOB, abdominal pain, diarrhea, constipation, melena, hematochezia, hematuria, dysuria    VITAL SIGNS:  Vital Signs Last 24 Hrs  T(C): 36.7 (30 Jul 2019 09:08), Max: 36.9 (30 Jul 2019 05:51)  T(F): 98 (30 Jul 2019 09:08), Max: 98.4 (30 Jul 2019 05:51)  HR: 73 (30 Jul 2019 09:08) (63 - 73)  BP: 105/66 (30 Jul 2019 09:08) (105/66 - 163/86)  BP(mean): --  RR: 18 (30 Jul 2019 09:08) (16 - 18)  SpO2: 98% (30 Jul 2019 09:08) (95% - 99%)          MEDICATIONS  (STANDING):  clindamycin   Capsule 300 milliGRAM(s) Oral every 6 hours  dextrose 5%. 1000 milliLiter(s) (50 mL/Hr) IV Continuous <Continuous>  dextrose 50% Injectable 12.5 Gram(s) IV Push once  dextrose 50% Injectable 25 Gram(s) IV Push once  dextrose 50% Injectable 25 Gram(s) IV Push once  enoxaparin Injectable 40 milliGRAM(s) SubCutaneous every 24 hours  insulin glargine Injectable (LANTUS) 30 Unit(s) SubCutaneous at bedtime  insulin lispro (HumaLOG) corrective regimen sliding scale   SubCutaneous Before meals and at bedtime  insulin lispro Injectable (HumaLOG) 6 Unit(s) SubCutaneous three times a day before meals  lisinopril 5 milliGRAM(s) Oral daily    MEDICATIONS  (PRN):  acetaminophen   Tablet .. 650 milliGRAM(s) Oral every 6 hours PRN Moderate Pain (4 - 6)  dextrose 40% Gel 15 Gram(s) Oral once PRN Blood Glucose LESS THAN 70 milliGRAM(s)/deciliter  glucagon  Injectable 1 milliGRAM(s) IntraMuscular once PRN Glucose LESS THAN 70 milligrams/deciliter    Allergies    No Known Allergies    Intolerances        LABS:                        14.0   3.85  )-----------( 152      ( 29 Jul 2019 08:40 )             41.7     07-29    143  |  106  |  15  ----------------------------<  213<H>  4.0   |  25  |  0.55    Ca    8.7      29 Jul 2019 08:40  Mg     2.0     07-29          CAPILLARY BLOOD GLUCOSE      POCT Blood Glucose.: 347 mg/dL (30 Jul 2019 08:37)          RADIOLOGY & ADDITIONAL TESTS: Reviewed. OVERNIGHT EVENTS: FS blood glucose in 300s, no other acute events overnight    SUBJECTIVE / INTERVAL HPI: Patient seen and examined at bedside. Dressing was changed this AM. Patient complains of dizziness, most significant on standing. Denies f/c, n/v, HA, chest pain, SOB, abdominal pain, diarrhea, constipation, melena, hematochezia, hematuria, dysuria      VITAL SIGNS:  T(C): 36.8 (07-30-19 @ 16:14), Max: 36.9 (07-30-19 @ 05:51)  T(F): 98.3 (07-30-19 @ 16:14), Max: 98.4 (07-30-19 @ 05:51)  HR: 69 (07-30-19 @ 16:14) (63 - 73)  BP: 108/64 (07-30-19 @ 16:14) (105/66 - 163/86)  BP(mean): --  RR: 18 (07-30-19 @ 16:14) (16 - 18)  SpO2: 97% (07-30-19 @ 16:14) (95% - 99%)  Wt(kg): --    PHYSICAL EXAM:  General: NAD, Laying comfortably in bed  HEENT: NC/AT, anicteric sclera, MMM, tender to palpation on occipital head but no lacerations or effusions  Neck: supple  Cardiovascular: +S1/S2, RRR, No murmurs, rubs, gallops  Respiratory: CTA B/L, no wheezing or crackles  Gastrointestinal: soft, NT/ND, +BSx4  Extremities: WWP, no edema, clubbing or cyanosis, R arm dressing for previous cellulitis I&D, is dry and not leaking  Vascular: 2+ radial      MEDICATIONS  (STANDING):  clindamycin   Capsule 300 milliGRAM(s) Oral every 6 hours  dextrose 5%. 1000 milliLiter(s) (50 mL/Hr) IV Continuous <Continuous>  dextrose 50% Injectable 12.5 Gram(s) IV Push once  dextrose 50% Injectable 25 Gram(s) IV Push once  dextrose 50% Injectable 25 Gram(s) IV Push once  enoxaparin Injectable 40 milliGRAM(s) SubCutaneous every 24 hours  insulin glargine Injectable (LANTUS) 30 Unit(s) SubCutaneous at bedtime  insulin lispro (HumaLOG) corrective regimen sliding scale   SubCutaneous Before meals and at bedtime  insulin lispro Injectable (HumaLOG) 6 Unit(s) SubCutaneous three times a day before meals  lisinopril 5 milliGRAM(s) Oral daily    MEDICATIONS  (PRN):  acetaminophen   Tablet .. 650 milliGRAM(s) Oral every 6 hours PRN Moderate Pain (4 - 6)  dextrose 40% Gel 15 Gram(s) Oral once PRN Blood Glucose LESS THAN 70 milliGRAM(s)/deciliter  glucagon  Injectable 1 milliGRAM(s) IntraMuscular once PRN Glucose LESS THAN 70 milligrams/deciliter    Allergies    No Known Allergies    Intolerances        LABS:                        14.0   3.85  )-----------( 152      ( 29 Jul 2019 08:40 )             41.7     07-29    143  |  106  |  15  ----------------------------<  213<H>  4.0   |  25  |  0.55    Ca    8.7      29 Jul 2019 08:40  Mg     2.0     07-29          CAPILLARY BLOOD GLUCOSE      POCT Blood Glucose.: 347 mg/dL (30 Jul 2019 08:37)          RADIOLOGY & ADDITIONAL TESTS: Reviewed. OVERNIGHT EVENTS: FS blood glucose in 300s, no other acute events overnight    SUBJECTIVE / INTERVAL HPI: Patient seen and examined at bedside. Dressing was changed this AM. Patient complains of dizziness, most significant on standing. Denies f/c, n/v, HA, chest pain, SOB, abdominal pain, diarrhea, constipation, melena, hematochezia, hematuria, dysuria      VITAL SIGNS:  T(C): 36.8 (07-30-19 @ 16:14), Max: 36.9 (07-30-19 @ 05:51)  T(F): 98.3 (07-30-19 @ 16:14), Max: 98.4 (07-30-19 @ 05:51)  HR: 69 (07-30-19 @ 16:14) (63 - 73)  BP: 108/64 (07-30-19 @ 16:14) (105/66 - 163/86)  BP(mean): --  RR: 18 (07-30-19 @ 16:14) (16 - 18)  SpO2: 97% (07-30-19 @ 16:14) (95% - 99%)  Wt(kg): --    PHYSICAL EXAM:  General: NAD, Laying comfortably in bed  HEENT: NC/AT, anicteric sclera, MMM, tender to palpation on occipital head but no lacerations or effusions  Neck: supple  Cardiovascular: +S1/S2, RRR, No murmurs, rubs, gallops  Respiratory: CTA B/L, no wheezing or crackles  Gastrointestinal: soft, NT/ND, +BSx4  Extremities: WWP, no edema, clubbing or cyanosis, R arm dressing for previous cellulitis I&D, is dry and not leaking, dressing changed this AM  Vascular: 2+ radial      MEDICATIONS  (STANDING):  clindamycin   Capsule 300 milliGRAM(s) Oral every 6 hours  dextrose 5%. 1000 milliLiter(s) (50 mL/Hr) IV Continuous <Continuous>  dextrose 50% Injectable 12.5 Gram(s) IV Push once  dextrose 50% Injectable 25 Gram(s) IV Push once  dextrose 50% Injectable 25 Gram(s) IV Push once  enoxaparin Injectable 40 milliGRAM(s) SubCutaneous every 24 hours  insulin glargine Injectable (LANTUS) 30 Unit(s) SubCutaneous at bedtime  insulin lispro (HumaLOG) corrective regimen sliding scale   SubCutaneous Before meals and at bedtime  insulin lispro Injectable (HumaLOG) 6 Unit(s) SubCutaneous three times a day before meals  lisinopril 5 milliGRAM(s) Oral daily    MEDICATIONS  (PRN):  acetaminophen   Tablet .. 650 milliGRAM(s) Oral every 6 hours PRN Moderate Pain (4 - 6)  dextrose 40% Gel 15 Gram(s) Oral once PRN Blood Glucose LESS THAN 70 milliGRAM(s)/deciliter  glucagon  Injectable 1 milliGRAM(s) IntraMuscular once PRN Glucose LESS THAN 70 milligrams/deciliter    Allergies    No Known Allergies    Intolerances        LABS:                        14.0   3.85  )-----------( 152      ( 29 Jul 2019 08:40 )             41.7     07-29    143  |  106  |  15  ----------------------------<  213<H>  4.0   |  25  |  0.55    Ca    8.7      29 Jul 2019 08:40  Mg     2.0     07-29          CAPILLARY BLOOD GLUCOSE      POCT Blood Glucose.: 347 mg/dL (30 Jul 2019 08:37)          RADIOLOGY & ADDITIONAL TESTS: Reviewed.

## 2019-07-30 NOTE — PROGRESS NOTE ADULT - PROBLEM SELECTOR PLAN 5
F: no IVF needed  E: replete as needed  N: DASH/TLC
F: no IVF needed  E: replete as needed  N: DASH/TLC.

## 2019-07-30 NOTE — PROGRESS NOTE ADULT - PROBLEM SELECTOR PLAN 2
recent admission to the hospital for RUE cellulitis s/p I&D on clindamycin PO  -c/w clindamycin  -daily dressing change, wound care consult

## 2019-07-30 NOTE — PROGRESS NOTE ADULT - PROBLEM SELECTOR PLAN 6
1) PCP Contacted on Admission: (Y/N) --> Name & Phone #:  2) Date of Contact with PCP: TBD  3) PCP Contacted at Discharge: TBD  4) Summary of Handoff Given to PCP: TBD   5) Post-Discharge Appointment Date: TBD
1) PCP Contacted on Admission: (Y/N) --> Name & Phone #:  2) Date of Contact with PCP: TBD  3) PCP Contacted at Discharge: TBD  4) Summary of Handoff Given to PCP: TBD   5) Post-Discharge Appointment Date: TBD.

## 2019-07-30 NOTE — PROGRESS NOTE ADULT - PROBLEM SELECTOR PLAN 4
not taking home hypertensive medications, patient -167  -will give lisinopril 5mg daily PO
not taking home hypertensive medications, patient -167  -c/w lisinopril 5mg daily PO

## 2019-07-30 NOTE — PROGRESS NOTE ADULT - PROBLEM SELECTOR PLAN 3
hgba1c 11, uncontrolled DM, noncompliant with home medications  -c/w sliding scale, monitor FS.  -lantus 35 units at bedtime  -lispro 10 units premeal
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
## 2019-07-31 VITALS
RESPIRATION RATE: 16 BRPM | SYSTOLIC BLOOD PRESSURE: 136 MMHG | TEMPERATURE: 99 F | HEART RATE: 60 BPM | DIASTOLIC BLOOD PRESSURE: 83 MMHG | OXYGEN SATURATION: 96 %

## 2019-07-31 LAB
GLUCOSE BLDC GLUCOMTR-MCNC: 167 MG/DL — HIGH (ref 70–99)
GLUCOSE BLDC GLUCOMTR-MCNC: 185 MG/DL — HIGH (ref 70–99)
GLUCOSE BLDC GLUCOMTR-MCNC: 243 MG/DL — HIGH (ref 70–99)

## 2019-07-31 PROCEDURE — 85025 COMPLETE CBC W/AUTO DIFF WBC: CPT

## 2019-07-31 PROCEDURE — 82607 VITAMIN B-12: CPT

## 2019-07-31 PROCEDURE — 97161 PT EVAL LOW COMPLEX 20 MIN: CPT

## 2019-07-31 PROCEDURE — 82010 KETONE BODYS QUAN: CPT

## 2019-07-31 PROCEDURE — 83735 ASSAY OF MAGNESIUM: CPT

## 2019-07-31 PROCEDURE — 82330 ASSAY OF CALCIUM: CPT

## 2019-07-31 PROCEDURE — 82962 GLUCOSE BLOOD TEST: CPT

## 2019-07-31 PROCEDURE — 80048 BASIC METABOLIC PNL TOTAL CA: CPT

## 2019-07-31 PROCEDURE — 87040 BLOOD CULTURE FOR BACTERIA: CPT

## 2019-07-31 PROCEDURE — 97110 THERAPEUTIC EXERCISES: CPT

## 2019-07-31 PROCEDURE — 99239 HOSP IP/OBS DSCHRG MGMT >30: CPT | Mod: GC

## 2019-07-31 PROCEDURE — 84443 ASSAY THYROID STIM HORMONE: CPT

## 2019-07-31 PROCEDURE — 70450 CT HEAD/BRAIN W/O DYE: CPT

## 2019-07-31 PROCEDURE — 72131 CT LUMBAR SPINE W/O DYE: CPT

## 2019-07-31 PROCEDURE — 93005 ELECTROCARDIOGRAM TRACING: CPT

## 2019-07-31 PROCEDURE — 36415 COLL VENOUS BLD VENIPUNCTURE: CPT

## 2019-07-31 PROCEDURE — 86780 TREPONEMA PALLIDUM: CPT

## 2019-07-31 PROCEDURE — 87086 URINE CULTURE/COLONY COUNT: CPT

## 2019-07-31 PROCEDURE — 96360 HYDRATION IV INFUSION INIT: CPT

## 2019-07-31 PROCEDURE — 82803 BLOOD GASES ANY COMBINATION: CPT

## 2019-07-31 PROCEDURE — 85027 COMPLETE CBC AUTOMATED: CPT

## 2019-07-31 PROCEDURE — 73521 X-RAY EXAM HIPS BI 2 VIEWS: CPT

## 2019-07-31 PROCEDURE — 81003 URINALYSIS AUTO W/O SCOPE: CPT

## 2019-07-31 PROCEDURE — 80053 COMPREHEN METABOLIC PANEL: CPT

## 2019-07-31 PROCEDURE — 97116 GAIT TRAINING THERAPY: CPT

## 2019-07-31 PROCEDURE — 84295 ASSAY OF SERUM SODIUM: CPT

## 2019-07-31 PROCEDURE — 83605 ASSAY OF LACTIC ACID: CPT

## 2019-07-31 PROCEDURE — 84132 ASSAY OF SERUM POTASSIUM: CPT

## 2019-07-31 PROCEDURE — 99285 EMERGENCY DEPT VISIT HI MDM: CPT | Mod: 25

## 2019-07-31 PROCEDURE — 71045 X-RAY EXAM CHEST 1 VIEW: CPT

## 2019-07-31 RX ORDER — INSULIN LISPRO 100/ML
10 VIAL (ML) SUBCUTANEOUS
Qty: 1 | Refills: 0
Start: 2019-07-31 | End: 2019-08-29

## 2019-07-31 RX ORDER — LISINOPRIL 2.5 MG/1
1 TABLET ORAL
Qty: 0 | Refills: 0 | DISCHARGE
Start: 2019-07-31

## 2019-07-31 RX ORDER — INSULIN GLARGINE 100 [IU]/ML
35 INJECTION, SOLUTION SUBCUTANEOUS
Qty: 0 | Refills: 0 | DISCHARGE
Start: 2019-07-31

## 2019-07-31 RX ORDER — LISINOPRIL 2.5 MG/1
5 TABLET ORAL ONCE
Refills: 0 | Status: COMPLETED | OUTPATIENT
Start: 2019-07-31 | End: 2019-07-31

## 2019-07-31 RX ORDER — LISINOPRIL 2.5 MG/1
10 TABLET ORAL DAILY
Refills: 0 | Status: DISCONTINUED | OUTPATIENT
Start: 2019-08-01 | End: 2019-07-31

## 2019-07-31 RX ADMIN — LISINOPRIL 5 MILLIGRAM(S): 2.5 TABLET ORAL at 05:59

## 2019-07-31 RX ADMIN — Medication 300 MILLIGRAM(S): at 16:55

## 2019-07-31 RX ADMIN — Medication 10 UNIT(S): at 12:42

## 2019-07-31 RX ADMIN — Medication 300 MILLIGRAM(S): at 00:40

## 2019-07-31 RX ADMIN — ENOXAPARIN SODIUM 40 MILLIGRAM(S): 100 INJECTION SUBCUTANEOUS at 15:44

## 2019-07-31 RX ADMIN — LISINOPRIL 5 MILLIGRAM(S): 2.5 TABLET ORAL at 09:33

## 2019-07-31 RX ADMIN — Medication 4: at 12:43

## 2019-07-31 RX ADMIN — Medication 300 MILLIGRAM(S): at 05:59

## 2019-07-31 RX ADMIN — Medication 2: at 08:36

## 2019-07-31 RX ADMIN — Medication 300 MILLIGRAM(S): at 11:27

## 2019-07-31 RX ADMIN — Medication 2: at 16:53

## 2019-07-31 RX ADMIN — Medication 10 UNIT(S): at 08:36

## 2019-07-31 NOTE — DISCHARGE NOTE NURSING/CASE MANAGEMENT/SOCIAL WORK - NSDCDPATPORTLINK_GEN_ALL_CORE
You can access the [facility] Portal, offered by [facility], by registering with the following website: [email or web address]

## 2019-07-31 NOTE — PROGRESS NOTE ADULT - ATTENDING COMMENTS
Pt seen and examined by me at beside earlier in AM. Agree with above,   wife at bedside, patient awating rehab placement.   no complaints  VSS, exam as above  Agree with a/p as above

## 2019-07-31 NOTE — PROGRESS NOTE ADULT - ASSESSMENT
74 year old M with HTN and DM presents to [facility] for mechanical fall after walking, admitted for fall workup    Problem/Plan - 1:  ·  Problem: Fall.  Plan: patient presenting s/p mechanical fall, no LOC, with history of unsteady gait per daughter in law, CT head negative for acute hemorrhage/infarction, CT thoracic/pelvic negative for fractures.   -fall risk precautions    Problem/Plan - 2:  ·  Problem: Cellulitis.  Plan: recent admission to the hospital for RUE cellulitis s/p I&D on clindamycin PO  -c/w clindamycin  -will change dressing.    Problem/Plan - 3:  ·  Problem: Type 2 diabetes mellitus.  Plan: hgba1c 11, uncontrolled DM, noncompliant with home medications  -c/w sliding scale, monitor FS.    Problem/Plan - 4:  ·  Problem: HTN (hypertension).  Plan: not taking home hypertensive medications, patient -167  -will give lisinopril 5mg daily PO.     Problem/Plan - 5:  ·  Problem: Nutrition, metabolism, and development symptoms.  Plan: F: no IVF needed  E: replete as needed  N: DASH/TLC.
74 year old M with HTN and DM presents to [facility] for mechanical fall after walking, admitted for fall workup
74 year old M with HTN and DM presents to [facility] for mechanical fall after walking, admitted for fall workup, awaiting MILTON placement
74 year old M with HTN and DM presents to [facility] for mechanical fall after walking, admitted for fall workup, awaiting MILTON placement

## 2019-07-31 NOTE — DISCHARGE NOTE NURSING/CASE MANAGEMENT/SOCIAL WORK - NSDPFAC_GEN_ALL_CORE
[facility], [address] , [place] [address] [phone] [address]

## 2019-07-31 NOTE — PROGRESS NOTE ADULT - SUBJECTIVE AND OBJECTIVE BOX
OVERNIGHT EVENTS: no acute events overnight    SUBJECTIVE / INTERVAL HPI: Patient seen and examined at bedside. Patient ambulating with walker and PT assistance. Patient still has dizziness on standing but has improved. Denies f/c, n/v, HA, chest pain, SOB, abdominal pain, diarrhea, constipation, melena, hematochezia, hematuria, dysuria.    VITAL SIGNS:  Vital Signs Last 24 Hrs  T(C): 36.7 (31 Jul 2019 09:26), Max: 36.8 (30 Jul 2019 16:14)  T(F): 98 (31 Jul 2019 09:26), Max: 98.3 (30 Jul 2019 16:14)  HR: 70 (31 Jul 2019 09:26) (64 - 70)  BP: 113/72 (31 Jul 2019 09:26) (108/64 - 162/75)  BP(mean): --  RR: 16 (31 Jul 2019 09:26) (16 - 18)  SpO2: 97% (31 Jul 2019 09:26) (96% - 97%)      07-30-19 @ 07:01  -  07-31-19 @ 07:00  --------------------------------------------------------  IN: 0 mL / OUT: 1050 mL / NET: -1050 mL        General: NAD, Laying comfortably in bed  HEENT: NC/AT, anicteric sclera, MMM, nontender head  Neck: supple  Cardiovascular: +S1/S2, RRR, No murmurs, rubs, gallops  Respiratory: CTA B/L, no wheezing or crackles  Gastrointestinal: soft, NT/ND, +BSx4  Extremities: WWP, no edema, clubbing or cyanosis, R arm dressing for previous cellulitis I&D,   Vascular: 2+ radial pulses B/L    MEDICATIONS  (STANDING):  clindamycin   Capsule 300 milliGRAM(s) Oral every 6 hours  dextrose 5%. 1000 milliLiter(s) (50 mL/Hr) IV Continuous <Continuous>  dextrose 50% Injectable 12.5 Gram(s) IV Push once  dextrose 50% Injectable 25 Gram(s) IV Push once  dextrose 50% Injectable 25 Gram(s) IV Push once  enoxaparin Injectable 40 milliGRAM(s) SubCutaneous every 24 hours  insulin glargine Injectable (LANTUS) 35 Unit(s) SubCutaneous at bedtime  insulin lispro (HumaLOG) corrective regimen sliding scale   SubCutaneous Before meals and at bedtime  insulin lispro Injectable (HumaLOG) 10 Unit(s) SubCutaneous three times a day before meals    MEDICATIONS  (PRN):  acetaminophen   Tablet .. 650 milliGRAM(s) Oral every 6 hours PRN Moderate Pain (4 - 6)  dextrose 40% Gel 15 Gram(s) Oral once PRN Blood Glucose LESS THAN 70 milliGRAM(s)/deciliter  glucagon  Injectable 1 milliGRAM(s) IntraMuscular once PRN Glucose LESS THAN 70 milligrams/deciliter    Allergies    No Known Allergies    Intolerances        LABS:              CAPILLARY BLOOD GLUCOSE      POCT Blood Glucose.: 185 mg/dL (31 Jul 2019 08:10)          RADIOLOGY & ADDITIONAL TESTS: Reviewed.

## 2019-08-01 RX ORDER — INSULIN LISPRO 100/ML
6 VIAL (ML) SUBCUTANEOUS
Qty: 0 | Refills: 0 | DISCHARGE

## 2019-08-01 RX ORDER — INSULIN HUMAN 100 [IU]/ML
60 INJECTION, SOLUTION SUBCUTANEOUS
Qty: 0 | Refills: 0 | DISCHARGE

## 2019-08-01 RX ORDER — INSULIN HUMAN 100 [IU]/ML
40 INJECTION, SOLUTION SUBCUTANEOUS
Qty: 0 | Refills: 0 | DISCHARGE

## 2019-08-01 RX ORDER — INSULIN GLARGINE 100 [IU]/ML
40 INJECTION, SOLUTION SUBCUTANEOUS
Qty: 0 | Refills: 0 | DISCHARGE

## 2019-08-02 DIAGNOSIS — E11.65 TYPE 2 DIABETES MELLITUS WITH HYPERGLYCEMIA: ICD-10-CM

## 2019-08-02 DIAGNOSIS — L02.413 CUTANEOUS ABSCESS OF RIGHT UPPER LIMB: ICD-10-CM

## 2019-08-02 DIAGNOSIS — Z79.4 LONG TERM (CURRENT) USE OF INSULIN: ICD-10-CM

## 2019-08-02 DIAGNOSIS — I10 ESSENTIAL (PRIMARY) HYPERTENSION: ICD-10-CM

## 2019-08-02 DIAGNOSIS — A41.01 SEPSIS DUE TO METHICILLIN SUSCEPTIBLE STAPHYLOCOCCUS AUREUS: ICD-10-CM
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
## 2019-08-02 DIAGNOSIS — E87.1 HYPO-OSMOLALITY AND HYPONATREMIA: ICD-10-CM

## 2019-08-02 DIAGNOSIS — E11.10 TYPE 2 DIABETES MELLITUS WITH KETOACIDOSIS WITHOUT COMA: ICD-10-CM

## 2019-08-02 DIAGNOSIS — E87.2 ACIDOSIS: ICD-10-CM
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
## 2019-08-02 DIAGNOSIS — L03.113 CELLULITIS OF RIGHT UPPER LIMB: ICD-10-CM

## 2019-08-02 DIAGNOSIS — A41.9 SEPSIS, UNSPECIFIED ORGANISM: ICD-10-CM

## 2019-08-02 DIAGNOSIS — R63.8 OTHER SYMPTOMS AND SIGNS CONCERNING FOOD AND FLUID INTAKE: ICD-10-CM

## 2019-08-02 DIAGNOSIS — S40.861A INSECT BITE (NONVENOMOUS) OF RIGHT UPPER ARM, INITIAL ENCOUNTER: ICD-10-CM

## 2019-08-02 LAB
CULTURE RESULTS: SIGNIFICANT CHANGE UP
CULTURE RESULTS: SIGNIFICANT CHANGE UP
SPECIMEN SOURCE: SIGNIFICANT CHANGE UP
SPECIMEN SOURCE: SIGNIFICANT CHANGE UP

## 2019-08-05 DIAGNOSIS — M06.9 RHEUMATOID ARTHRITIS, UNSPECIFIED: ICD-10-CM

## 2019-08-05 DIAGNOSIS — Z79.4 LONG TERM (CURRENT) USE OF INSULIN: ICD-10-CM

## 2019-08-05 DIAGNOSIS — L03.113 CELLULITIS OF RIGHT UPPER LIMB: ICD-10-CM

## 2019-08-05 DIAGNOSIS — Z91.14 PATIENT'S OTHER NONCOMPLIANCE WITH MEDICATION REGIMEN: ICD-10-CM

## 2019-08-05 DIAGNOSIS — I10 ESSENTIAL (PRIMARY) HYPERTENSION: ICD-10-CM

## 2019-08-05 DIAGNOSIS — E11.40 TYPE 2 DIABETES MELLITUS WITH DIABETIC NEUROPATHY, UNSPECIFIED: ICD-10-CM

## 2019-08-05 DIAGNOSIS — E11.65 TYPE 2 DIABETES MELLITUS WITH HYPERGLYCEMIA: ICD-10-CM

## 2019-08-05 DIAGNOSIS — M17.0 BILATERAL PRIMARY OSTEOARTHRITIS OF KNEE: ICD-10-CM

## 2019-08-05 DIAGNOSIS — R26.81 UNSTEADINESS ON FEET: ICD-10-CM

## 2019-10-20 ENCOUNTER — INPATIENT (INPATIENT)
Facility: HOSPITAL | Age: 74
LOS: 2 days | Discharge: NURSING, REHAB OR LONG-TERM CARE | DRG: 554 | End: 2019-10-23
Attending: INTERNAL MEDICINE | Admitting: INTERNAL MEDICINE
Payer: MEDICARE

## 2019-10-20 VITALS
SYSTOLIC BLOOD PRESSURE: 165 MMHG | RESPIRATION RATE: 18 BRPM | TEMPERATURE: 98 F | HEART RATE: 69 BPM | OXYGEN SATURATION: 98 % | DIASTOLIC BLOOD PRESSURE: 81 MMHG

## 2019-10-20 DIAGNOSIS — R91.1 SOLITARY PULMONARY NODULE: ICD-10-CM

## 2019-10-20 DIAGNOSIS — M17.11 UNILATERAL PRIMARY OSTEOARTHRITIS, RIGHT KNEE: ICD-10-CM

## 2019-10-20 DIAGNOSIS — Z23 ENCOUNTER FOR IMMUNIZATION: ICD-10-CM

## 2019-10-20 DIAGNOSIS — E11.9 TYPE 2 DIABETES MELLITUS WITHOUT COMPLICATIONS: ICD-10-CM

## 2019-10-20 DIAGNOSIS — I10 ESSENTIAL (PRIMARY) HYPERTENSION: ICD-10-CM

## 2019-10-20 DIAGNOSIS — E78.5 HYPERLIPIDEMIA, UNSPECIFIED: ICD-10-CM

## 2019-10-20 DIAGNOSIS — S20.91XA ABRASION OF UNSPECIFIED PARTS OF THORAX, INITIAL ENCOUNTER: ICD-10-CM

## 2019-10-20 DIAGNOSIS — E11.21 TYPE 2 DIABETES MELLITUS WITH DIABETIC NEPHROPATHY: ICD-10-CM

## 2019-10-20 DIAGNOSIS — D49.1 NEOPLASM OF UNSPECIFIED BEHAVIOR OF RESPIRATORY SYSTEM: ICD-10-CM

## 2019-10-20 DIAGNOSIS — Z91.89 OTHER SPECIFIED PERSONAL RISK FACTORS, NOT ELSEWHERE CLASSIFIED: ICD-10-CM

## 2019-10-20 DIAGNOSIS — Z91.14 PATIENT'S OTHER NONCOMPLIANCE WITH MEDICATION REGIMEN: ICD-10-CM

## 2019-10-20 DIAGNOSIS — R29.6 REPEATED FALLS: ICD-10-CM
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
## 2019-10-20 DIAGNOSIS — R62.7 ADULT FAILURE TO THRIVE: ICD-10-CM

## 2019-10-20 DIAGNOSIS — Z79.4 LONG TERM (CURRENT) USE OF INSULIN: ICD-10-CM

## 2019-10-20 DIAGNOSIS — N28.1 CYST OF KIDNEY, ACQUIRED: ICD-10-CM

## 2019-10-20 DIAGNOSIS — D69.6 THROMBOCYTOPENIA, UNSPECIFIED: ICD-10-CM

## 2019-10-20 DIAGNOSIS — Z79.84 LONG TERM (CURRENT) USE OF ORAL HYPOGLYCEMIC DRUGS: ICD-10-CM

## 2019-10-20 DIAGNOSIS — Z29.9 ENCOUNTER FOR PROPHYLACTIC MEASURES, UNSPECIFIED: ICD-10-CM

## 2019-10-20 DIAGNOSIS — R63.8 OTHER SYMPTOMS AND SIGNS CONCERNING FOOD AND FLUID INTAKE: ICD-10-CM

## 2019-10-20 LAB
ALBUMIN SERPL ELPH-MCNC: 3.8 G/DL — SIGNIFICANT CHANGE UP (ref 3.3–5)
ALP SERPL-CCNC: 66 U/L — SIGNIFICANT CHANGE UP (ref 40–120)
ALT FLD-CCNC: 16 U/L — SIGNIFICANT CHANGE UP (ref 10–45)
ANION GAP SERPL CALC-SCNC: 11 MMOL/L — SIGNIFICANT CHANGE UP (ref 5–17)
AST SERPL-CCNC: 16 U/L — SIGNIFICANT CHANGE UP (ref 10–40)
BILIRUB SERPL-MCNC: 0.4 MG/DL — SIGNIFICANT CHANGE UP (ref 0.2–1.2)
BUN SERPL-MCNC: 24 MG/DL — HIGH (ref 7–23)
CALCIUM SERPL-MCNC: 8.6 MG/DL — SIGNIFICANT CHANGE UP (ref 8.4–10.5)
CHLORIDE SERPL-SCNC: 104 MMOL/L — SIGNIFICANT CHANGE UP (ref 96–108)
CO2 SERPL-SCNC: 25 MMOL/L — SIGNIFICANT CHANGE UP (ref 22–31)
CREAT SERPL-MCNC: 0.78 MG/DL — SIGNIFICANT CHANGE UP (ref 0.5–1.3)
EXTRA BLUE TOP TUBE: SIGNIFICANT CHANGE UP
EXTRA SST TUBE: SIGNIFICANT CHANGE UP
GLUCOSE SERPL-MCNC: 290 MG/DL — HIGH (ref 70–99)
HCT VFR BLD CALC: 42.4 % — SIGNIFICANT CHANGE UP (ref 39–50)
HGB BLD-MCNC: 13.9 G/DL — SIGNIFICANT CHANGE UP (ref 13–17)
MCHC RBC-ENTMCNC: 30.1 PG — SIGNIFICANT CHANGE UP (ref 27–34)
MCHC RBC-ENTMCNC: 32.8 GM/DL — SIGNIFICANT CHANGE UP (ref 32–36)
MCV RBC AUTO: 91.8 FL — SIGNIFICANT CHANGE UP (ref 80–100)
NRBC # BLD: 0 /100 WBCS — SIGNIFICANT CHANGE UP (ref 0–0)
PLATELET # BLD AUTO: 114 K/UL — LOW (ref 150–400)
POTASSIUM SERPL-MCNC: 4 MMOL/L — SIGNIFICANT CHANGE UP (ref 3.5–5.3)
POTASSIUM SERPL-SCNC: 4 MMOL/L — SIGNIFICANT CHANGE UP (ref 3.5–5.3)
PROT SERPL-MCNC: 6.9 G/DL — SIGNIFICANT CHANGE UP (ref 6–8.3)
RBC # BLD: 4.62 M/UL — SIGNIFICANT CHANGE UP (ref 4.2–5.8)
RBC # FLD: 12.1 % — SIGNIFICANT CHANGE UP (ref 10.3–14.5)
SODIUM SERPL-SCNC: 140 MMOL/L — SIGNIFICANT CHANGE UP (ref 135–145)
WBC # BLD: 6.49 K/UL — SIGNIFICANT CHANGE UP (ref 3.8–10.5)
WBC # FLD AUTO: 6.49 K/UL — SIGNIFICANT CHANGE UP (ref 3.8–10.5)

## 2019-10-20 PROCEDURE — 93010 ELECTROCARDIOGRAM REPORT: CPT

## 2019-10-20 PROCEDURE — 72125 CT NECK SPINE W/O DYE: CPT | Mod: 26

## 2019-10-20 PROCEDURE — 74177 CT ABD & PELVIS W/CONTRAST: CPT | Mod: 26

## 2019-10-20 PROCEDURE — 71045 X-RAY EXAM CHEST 1 VIEW: CPT | Mod: 26

## 2019-10-20 PROCEDURE — 99223 1ST HOSP IP/OBS HIGH 75: CPT | Mod: GC

## 2019-10-20 PROCEDURE — 70450 CT HEAD/BRAIN W/O DYE: CPT | Mod: 26

## 2019-10-20 PROCEDURE — 99285 EMERGENCY DEPT VISIT HI MDM: CPT

## 2019-10-20 PROCEDURE — 71260 CT THORAX DX C+: CPT | Mod: 26

## 2019-10-20 PROCEDURE — 73590 X-RAY EXAM OF LOWER LEG: CPT | Mod: 26,RT

## 2019-10-20 RX ORDER — DEXTROSE 50 % IN WATER 50 %
25 SYRINGE (ML) INTRAVENOUS ONCE
Refills: 0 | Status: DISCONTINUED | OUTPATIENT
Start: 2019-10-20 | End: 2019-10-23

## 2019-10-20 RX ORDER — GLUCAGON INJECTION, SOLUTION 0.5 MG/.1ML
1 INJECTION, SOLUTION SUBCUTANEOUS ONCE
Refills: 0 | Status: DISCONTINUED | OUTPATIENT
Start: 2019-10-20 | End: 2019-10-23

## 2019-10-20 RX ORDER — SITAGLIPTIN AND METFORMIN HYDROCHLORIDE 500; 50 MG/1; MG/1
0 TABLET, FILM COATED ORAL
Qty: 60 | Refills: 0 | DISCHARGE

## 2019-10-20 RX ORDER — LISINOPRIL 2.5 MG/1
10 TABLET ORAL DAILY
Refills: 0 | Status: DISCONTINUED | OUTPATIENT
Start: 2019-10-20 | End: 2019-10-23

## 2019-10-20 RX ORDER — LISINOPRIL 2.5 MG/1
5 TABLET ORAL DAILY
Refills: 0 | Status: DISCONTINUED | OUTPATIENT
Start: 2019-10-20 | End: 2019-10-20

## 2019-10-20 RX ORDER — ALBUTEROL 90 UG/1
2 AEROSOL, METERED ORAL
Qty: 0 | Refills: 0 | DISCHARGE

## 2019-10-20 RX ORDER — ENOXAPARIN SODIUM 100 MG/ML
40 INJECTION SUBCUTANEOUS EVERY 24 HOURS
Refills: 0 | Status: DISCONTINUED | OUTPATIENT
Start: 2019-10-21 | End: 2019-10-23

## 2019-10-20 RX ORDER — EMPAGLIFLOZIN 10 MG/1
1 TABLET, FILM COATED ORAL
Qty: 0 | Refills: 0 | DISCHARGE

## 2019-10-20 RX ORDER — LOSARTAN POTASSIUM 100 MG/1
1 TABLET, FILM COATED ORAL
Qty: 0 | Refills: 0 | DISCHARGE

## 2019-10-20 RX ORDER — SITAGLIPTIN AND METFORMIN HYDROCHLORIDE 500; 50 MG/1; MG/1
1 TABLET, FILM COATED ORAL
Qty: 0 | Refills: 0 | DISCHARGE

## 2019-10-20 RX ORDER — INSULIN ASPART 100 [IU]/ML
35 INJECTION, SUSPENSION SUBCUTANEOUS
Qty: 0 | Refills: 0 | DISCHARGE

## 2019-10-20 RX ORDER — METFORMIN HYDROCHLORIDE 850 MG/1
1 TABLET ORAL
Qty: 0 | Refills: 0 | DISCHARGE

## 2019-10-20 RX ORDER — DEXTROSE 50 % IN WATER 50 %
15 SYRINGE (ML) INTRAVENOUS ONCE
Refills: 0 | Status: DISCONTINUED | OUTPATIENT
Start: 2019-10-20 | End: 2019-10-23

## 2019-10-20 RX ORDER — SODIUM CHLORIDE 9 MG/ML
1000 INJECTION INTRAMUSCULAR; INTRAVENOUS; SUBCUTANEOUS
Refills: 0 | Status: DISCONTINUED | OUTPATIENT
Start: 2019-10-20 | End: 2019-10-21

## 2019-10-20 RX ORDER — INSULIN GLARGINE 100 [IU]/ML
25 INJECTION, SOLUTION SUBCUTANEOUS ONCE
Refills: 0 | Status: COMPLETED | OUTPATIENT
Start: 2019-10-20 | End: 2019-10-20

## 2019-10-20 RX ORDER — LISINOPRIL 2.5 MG/1
0 TABLET ORAL
Qty: 30 | Refills: 0 | DISCHARGE

## 2019-10-20 RX ORDER — INSULIN NPH HUM/REG INSULIN HM 70-30/ML
50 VIAL (ML) SUBCUTANEOUS
Qty: 0 | Refills: 0 | DISCHARGE

## 2019-10-20 RX ORDER — FLUTICASONE FUROATE AND VILANTEROL TRIFENATATE 100; 25 UG/1; UG/1
1 POWDER RESPIRATORY (INHALATION)
Qty: 0 | Refills: 0 | DISCHARGE

## 2019-10-20 RX ORDER — DOXAZOSIN MESYLATE 4 MG
1 TABLET ORAL
Qty: 0 | Refills: 0 | DISCHARGE

## 2019-10-20 RX ORDER — ALBUTEROL 90 UG/1
2 AEROSOL, METERED ORAL EVERY 6 HOURS
Refills: 0 | Status: DISCONTINUED | OUTPATIENT
Start: 2019-10-20 | End: 2019-10-23

## 2019-10-20 RX ORDER — EMPAGLIFLOZIN 10 MG/1
0 TABLET, FILM COATED ORAL
Qty: 30 | Refills: 0 | DISCHARGE

## 2019-10-20 RX ORDER — INSULIN LISPRO 100/ML
VIAL (ML) SUBCUTANEOUS
Refills: 0 | Status: DISCONTINUED | OUTPATIENT
Start: 2019-10-20 | End: 2019-10-23

## 2019-10-20 RX ORDER — DOCUSATE SODIUM 100 MG
0 CAPSULE ORAL
Qty: 0 | Refills: 0 | DISCHARGE

## 2019-10-20 RX ORDER — TETANUS TOXOID, REDUCED DIPHTHERIA TOXOID AND ACELLULAR PERTUSSIS VACCINE, ADSORBED 5; 2.5; 8; 8; 2.5 [IU]/.5ML; [IU]/.5ML; UG/.5ML; UG/.5ML; UG/.5ML
0.5 SUSPENSION INTRAMUSCULAR ONCE
Refills: 0 | Status: COMPLETED | OUTPATIENT
Start: 2019-10-20 | End: 2019-10-20

## 2019-10-20 RX ORDER — SODIUM CHLORIDE 9 MG/ML
1000 INJECTION, SOLUTION INTRAVENOUS
Refills: 0 | Status: DISCONTINUED | OUTPATIENT
Start: 2019-10-20 | End: 2019-10-23

## 2019-10-20 RX ORDER — DEXTROSE 50 % IN WATER 50 %
12.5 SYRINGE (ML) INTRAVENOUS ONCE
Refills: 0 | Status: DISCONTINUED | OUTPATIENT
Start: 2019-10-20 | End: 2019-10-23

## 2019-10-20 RX ADMIN — SODIUM CHLORIDE 125 MILLILITER(S): 9 INJECTION INTRAMUSCULAR; INTRAVENOUS; SUBCUTANEOUS at 16:11

## 2019-10-20 RX ADMIN — TETANUS TOXOID, REDUCED DIPHTHERIA TOXOID AND ACELLULAR PERTUSSIS VACCINE, ADSORBED 0.5 MILLILITER(S): 5; 2.5; 8; 8; 2.5 SUSPENSION INTRAMUSCULAR at 16:17

## 2019-10-20 RX ADMIN — INSULIN GLARGINE 25 UNIT(S): 100 INJECTION, SOLUTION SUBCUTANEOUS at 23:36

## 2019-10-20 NOTE — H&P ADULT - NSICDXPASTMEDICALHX_GEN_ALL_CORE_FT
PAST MEDICAL HISTORY:  Diabetes     Diabetic nephropathy     HTN (hypertension)     HTN (hypertension)     Hypercholesteremia     Type 2 diabetes mellitus Diagnosed roughly 10 years ago and on insulin since

## 2019-10-20 NOTE — H&P ADULT - ATTENDING COMMENTS
patient seen and examined; Mandarin  [id number] used  reviewed pertinent data, h&p  PE findings as above except pt looks younger than stated age; in addtion pt w/ bruise at R cheek     1. pt fell and found on floor by family on Thursday ; PT evaluation pending, wound dressing and bacitracin to abrasions    2. Biopsy/ PET of RUL lesion noted on CT chest ; followup kidney cyst workup   3. monitor platelets    rest of plan as above   medical decision making : high complexity

## 2019-10-20 NOTE — ED ADULT NURSE NOTE - OBJECTIVE STATEMENT
pt to ER w/ report that he fell yesterday at home and remained on floor for several hours.  Abrasions to R ribs and R flank and RLE noted.  Pt's family members state he did not complain of cp/sob/f/c/n/v.  Breathing unlabored, skin warm and dry. IV access established, labs drawn and sent. Will continue to monitor.

## 2019-10-20 NOTE — H&P ADULT - PROBLEM SELECTOR PLAN 5
F: NS @125cc/hr  E: replete PRN  N: DASH/TLC/CC Small focal nodular density right upper lobe posteriorly measuring 12 mm, indeterminate.  - consider repeat CT at 3 months, PET/CT or biopsy    #Renal cyst  3.7 cm left renal cyst, otherwise normal kidneys  - kidney function wnl  - no acute intervention Abrasion on R chest area, with fluid filled blisters and crusting, + R knee abrasion s/p a fall  - bacitracin ointment PLTs 114 on ED presentation, as per chart review pt with thrombocytopenia in July 2019, with PLTs 95 - 107, which improved to 130s-->152 on discharge. Unclear etiology, however currently w/o any s/s of bleeding  - monitor PLTs  - will consider w/up if thrombocytopenia worsens    ADDENDUM: dc lovenox if platelets decreasing

## 2019-10-20 NOTE — ED PROVIDER NOTE - EKG ADDITIONAL QUESTION - PERFORMED INDEPENDENT VISUALIZATION
"  Yancy Maldonado Patient Age: 78year old  MESSAGE: Amaury Anthony states that she has been experiencing arthritis pain and weakness, no appetite, nausea and vomiting. This has been going on for 5 months and her symptoms are getting worse. Please advise. Transferred to Nurse queue. WEIGHT AND HEIGHT:   Wt Readings from Last 1 Encounters:   06/19/19 55.8 kg (123 lb)     Ht Readings from Last 1 Encounters:   06/19/19 5' 3"" (1.6 m)     BMI Readings from Last 1 Encounters:   06/19/19 21.79 kg/mÂ²       ALLERGIES:  Vancomycin and Lisinopril  Current Outpatient Medications   Medication   â¢ celecoxib (CELEBREX) 200 MG capsule   â¢ propranolol (INDERAL) 40 MG tablet   â¢ metformin (GLUCOPHAGE) 1000 MG tablet   â¢ Cholecalciferol (VITAMIN D3) 400 units Cap   â¢ Multiple Vitamins-Minerals (MULTI COMPLETE) Cap   â¢ atorvastatin (LIPITOR) 40 MG tablet   â¢ glimepiride (AMARYL) 2 MG tablet   â¢ hydrochlorothiazide (MICROZIDE) 12.5 MG capsule   â¢ amoxicillin (AMOXIL) 500 MG capsule   â¢ hydrochlorothiazide (MICROZIDE) 12.5 MG capsule     No current facility-administered medications for this visit.       PHARMACY to use:           Pharmacy preference(s) on file:   [facility] [address] [place] [address], [address] [place] [address] ([address]) [address]  Phone: [phone] Fax: [phone] [facility] to leave response (including medical information) on answering machine  ROUTING: Patient's physician/staff        PCP: Annie Garrido CNP         INS: Payor: Darryn Rodas / Plan: [address] [place] [address] Avenue HMO / Product Type: MEDADV   PATIENT ADDRESS:  [facility]    " Yes

## 2019-10-20 NOTE — H&P ADULT - HISTORY OF PRESENT ILLNESS
Vitals in the ED:   Labs:  EKG:  CXR:  ED course: Vitals in the ED: T 98.5, HR 69, /81, RR 18, SpO2 98% on RA. Repeat vitals: T 97.8, HR 90, /70, RR 16, SpO2 97% on RA  Labs: WBC 6.49, Hgb 13.9, Hct 42.4, , Na 140, K 4.0, Cl 104, CO2 25, BUN/Cr 24/0.78, Glu 290  EKG: NSR, inferior infarct, age undetermined  CXR: ??LLL infiltrate  ED course: started on NS @125cc/hr Patient is a 73 yo M with a PMH of DM and HTN brought to the ED s/p a fall. Patient accompanied by family who witnessed a mechanical fall at home, but prior to that he was found on the floor under the bed. Patient with recent admission at [facility] in July 2019 for a fall, was discharged to [facility]. He reports that he feels unsteady and has had multiple falls due to his knees giving out. He has OA worse in his R knee and reports his knee yee and gives out. He reports he fell on his right side and hit his knee, R cheek and chest. Denies any LOC. Reports R knee pain. Denies fever, chills, chest pain, palpitations, SOB, abdominal pain, nausea/vomiting, diarrhea/constipation.     Vitals in the ED: T 98.5, HR 69, /81, RR 18, SpO2 98% on RA. Repeat vitals: T 97.8, HR 90, /70, RR 16, SpO2 97% on RA  Labs: WBC 6.49, Hgb 13.9, Hct 42.4, , Na 140, K 4.0, Cl 104, CO2 25, BUN/Cr 24/0.78, Glu 290  EKG: NSR, inferior infarct, age undetermined  CXR: ??LLL infiltrate  ED course: started on NS @125cc/hr Patient is a 75 yo M with a PMH of DM and HTN brought to the ED s/p a fall on 10/19. Patient accompanied by family who witnessed a mechanical fall at home, but prior to that he was ??found on the floor under the bed as per ED note. He reports he tripped and fell on his right side and hit his knee, R cheek and chest, he was on the floor and his wife lifted him up. Patient with recent admission at [facility] in July 2019 for a fall, was discharged to [facility]. He reports that he feels unsteady and has had multiple falls due to his knees giving out. He has OA, worse in his R knee and reports his knee yee and gives out. Denies any LOC. Reports R knee pain. Denies fever, chills, chest pain, palpitations, SOB, abdominal pain, nausea/vomiting, diarrhea/constipation.     Vitals in the ED: T 98.5, HR 69, /81, RR 18, SpO2 98% on RA. Repeat vitals: T 97.8, HR 90, /70, RR 16, SpO2 97% on RA  Labs: WBC 6.49, Hgb 13.9, Hct 42.4, , Na 140, K 4.0, Cl 104, CO2 25, BUN/Cr 24/0.78, Glu 290  EKG: NSR, inferior infarct, age undetermined  CXR: no acute infiltrates  ED course: CT head and CT c-spine negative for fractures. CTAP with no acute pathology. He was started on NS @125cc/hr

## 2019-10-20 NOTE — ED ADULT NURSE NOTE - PMH
Diabetes    Diabetic nephropathy    HTN (hypertension)    HTN (hypertension)    Hypercholesteremia    Type 2 diabetes mellitus  Diagnosed roughly 10 years ago and on insulin since

## 2019-10-20 NOTE — H&P ADULT - PROBLEM SELECTOR PLAN 1
Patient with a hx of multiple falls with recent admission in July 2019 for a fall, discharged to [facility]. He now presents s/p a witness fall at home, with a prior unwitnessed fall. Denies chest pain, palpitations preceding the fall. EKG w/ NSR no ischemic changes  - CT head: no hemorrhage or infarct  - PT consult  - Fall precautions  - TSH, B12 normal in July 2019 Patient with a hx of multiple falls with recent admission in July 2019 for a fall, discharged to [facility]. He now presents s/p a witness fall at home, with a prior unwitnessed fall. Denies chest pain, palpitations preceding the fall. EKG w/ NSR no ischemic changes. Etiology likely in the setting of unsteady gait 2/2 osteoarthritis   - CT head: no hemorrhage or infarct  - CT c-spine: no fracture  - PT consult  - Fall precautions  - TSH, B12 normal in July 2019

## 2019-10-20 NOTE — H&P ADULT - NSHPPHYSICALEXAM_GEN_ALL_CORE
VITAL SIGNS:  Vital Signs Last 24 Hrs  T(C): 36.6 (20 Oct 2019 20:26), Max: 36.9 (20 Oct 2019 13:49)  T(F): 97.8 (20 Oct 2019 20:26), Max: 98.5 (20 Oct 2019 13:49)  HR: 90 (20 Oct 2019 20:26) (69 - 90)  BP: 155/70 (20 Oct 2019 20:26) (155/70 - 165/81)  BP(mean): --  RR: 16 (20 Oct 2019 20:26) (16 - 18)  SpO2: 97% (20 Oct 2019 20:26) (97% - 98%)    PHYSICAL EXAM:  General: in NAD, lying comfortably in bed  HEENT: normocephalic, atraumatic; PERRL, anicteric sclera; MMM  Neck: supple, no JVD, no thyromegaly, no lymphadenopathy  Cardiovascular: +S1/S2, RRR, no M/G/R  Respiratory: clear to auscultation B/L; no wheezing, no rales, no rhonchi  Gastrointestinal: soft, NT/ND; +BSx4, no organomegaly  Extremities: WWP; no edema, clubbing or cyanosis  Vascular: 2+ radial, DP/PT pulses B/L  Neurological: AAOx3; no focal deficits VITAL SIGNS:  Vital Signs Last 24 Hrs  T(C): 36.6 (20 Oct 2019 20:26), Max: 36.9 (20 Oct 2019 13:49)  T(F): 97.8 (20 Oct 2019 20:26), Max: 98.5 (20 Oct 2019 13:49)  HR: 90 (20 Oct 2019 20:26) (69 - 90)  BP: 155/70 (20 Oct 2019 20:26) (155/70 - 165/81)  BP(mean): --  RR: 16 (20 Oct 2019 20:26) (16 - 18)  SpO2: 97% (20 Oct 2019 20:26) (97% - 98%)    PHYSICAL EXAM:  General: elderly male, in NAD, lying comfortably in bed  HEENT: normocephalic, atraumatic; PERRL, anicteric sclera; dry MM  Neck: supple, no JVD, no thyromegaly, no lymphadenopathy  Cardiovascular: +S1/S2, RRR, no M/G/R  Chest: abrasion on R chest area, with fluid filled blisters and crusting  Respiratory: clear to auscultation B/L; no wheezing, no rales, no rhonchi  Gastrointestinal: soft, NT/ND; +BSx4, no organomegaly  Extremities: WWP; no edema, clubbing or cyanosis; R knee with lateral area of small healing abrasion  Vascular: 2+ radial, DP/PT pulses B/L  Neurological: AAOx3; strength 5/5 on b/l UE and LEs, sensation intact, no focal deficits

## 2019-10-20 NOTE — H&P ADULT - NSICDXFAMILYHX_GEN_ALL_CORE_FT
FAMILY HISTORY:  FH: diabetes mellitus, mother and father  No pertinent family history in first degree relatives FAMILY HISTORY:  FH: diabetes mellitus, mother and father

## 2019-10-20 NOTE — H&P ADULT - PROBLEM SELECTOR PLAN 7
1) PCP Contacted on Admission: (Y/N) --> Name & Phone #:  2) Date of Contact with PCP:  3) PCP Contacted at Discharge: (Y/N)  4) Summary of Handoff Given to PCP:   5) Post-Discharge Appointment Date and Location: DVT ppx: Lovenox 40mg SC qd  GI ppx: none    Dispo: [facility]  Code: FULL F: NS @125cc/hr  E: replete PRN  N: DASH/TLC/CC

## 2019-10-20 NOTE — ED PROVIDER NOTE - OBJECTIVE STATEMENT
73 yo male with hx of DM HTN bibems with family for apparent fall 1 day ago found on floor under bed by family  ? loc  mild headache and neck pain and abrasion right lateral chest wall  and right lateral leg -  no deformity noted- last tetanus?

## 2019-10-20 NOTE — H&P ADULT - PROBLEM SELECTOR PLAN 3
Hx of uncontrolled DM (A1c 11 in July 2019), previously reported noncompliant with medications. Was discharged on Lantus 35 units qhs and lispro 10u TIDAC in July 2019  - mISS  - monitor FSG  - dosed for Lantus 25u qhs as patient hasn't eaten, increase to home dose as per FSG  - start lispro 5u TIDAC, increase as per FSG Hx of uncontrolled DM (A1c 11 in July 2019), previously reported noncompliant with medications. Was discharged on Lantus 35 units qhs and lispro 10u TIDAC in July 2019; also on Janumet 50-1000mg and Jardiance 25mg at home  - mISS  - monitor FSG  - dosed for Lantus 25u qhs as patient hasn't eaten, increase to home dose as per FSG  - start lispro 5u TIDAC, increase as per FSG Abrasion on R chest area, with fluid filled blisters and crusting, + R knee abrasion s/p a fall  - bacitracin ointment

## 2019-10-20 NOTE — H&P ADULT - PROBLEM SELECTOR PLAN 6
DVT ppx: Lovenox 40mg SC qd  GI ppx: none    Dispo: [facility]  Code: FULL F: NS @125cc/hr  E: replete PRN  N: DASH/TLC/CC Small focal nodular density right upper lobe posteriorly measuring 12 mm, indeterminate.  - consider repeat CT at 3 months, PET/CT or biopsy    #Renal cyst  3.7 cm left renal cyst, otherwise normal kidneys  - kidney function wnl  - no acute intervention On lisinopril 10mg qd at home   - c/w home med

## 2019-10-20 NOTE — H&P ADULT - PROBLEM SELECTOR PROBLEM 6
Prophylactic measure Nutrition, metabolism, and development symptoms Pulmonary nodule HTN (hypertension)

## 2019-10-20 NOTE — ED ADULT NURSE NOTE - NSIMPLEMENTINTERV_GEN_ALL_ED
Implemented All Fall Risk Interventions:  [place] to call system. Call bell, personal items and telephone within reach. Instruct patient to call for assistance. Room bathroom lighting operational. Non-slip footwear when patient is off stretcher. Physically safe environment: no spills, clutter or unnecessary equipment. Stretcher in lowest position, wheels locked, appropriate side rails in place. Provide visual cue, wrist band, yellow gown, etc. Monitor gait and stability. Monitor for mental status changes and reorient to person, place, and time. Review medications for side effects contributing to fall risk. Reinforce activity limits and safety measures with patient and family.

## 2019-10-20 NOTE — H&P ADULT - PROBLEM SELECTOR PLAN 2
Recently discharged on lisinopril 5mg daily   - c/w home med On lisinopril 10mg qd at home   - c/w home med Small focal nodular density right upper lobe posteriorly measuring 12 mm, indeterminate.  - consider repeat CT at 3 months, PET/CT or biopsy    ADDENDUM: suspicious for neoplasm on official CT, recommend bx  and/or PET  #Renal cyst  3.7 cm left renal cyst, otherwise normal kidneys  - kidney function wnl  - no acute intervention

## 2019-10-20 NOTE — H&P ADULT - PROBLEM SELECTOR PLAN 8
1) PCP Contacted on Admission: (Y/N) --> Name & Phone #:  2) Date of Contact with PCP:  3) PCP Contacted at Discharge: (Y/N)  4) Summary of Handoff Given to PCP:   5) Post-Discharge Appointment Date and Location: DVT ppx: Lovenox 40mg SC qd  GI ppx: none    Dispo: [facility]  Code: FULL

## 2019-10-20 NOTE — H&P ADULT - PROBLEM SELECTOR PLAN 4
Small focal nodular density right upper lobe posteriorly measuring 12 mm, indeterminate.  - consider repeat CT at 3 months, PET/CT or biopsy    #Renal cyst  3.7 cm left renal cyst, otherwise normal kidneys  - kidney function wnl  - no acute intervention PLTs 114 on ED presentation, as per chart review pt with thrombocytopenia in July 2019, with PLTs 95 - 107, which improved to 130s-->152 on discharge. Unclear etiology, however currently w/o any s/s of bleeding  - monitor PLTs  - will consider w/up if thrombocytopenia worsens Hx of uncontrolled DM (A1c 11 in July 2019), previously reported noncompliant with medications. Was discharged on Lantus 35 units qhs and lispro 10u TIDAC in July 2019; also on Janumet 50-1000mg and Jardiance 25mg at home  - mISS  - monitor FSG  - dosed for Lantus 25u qhs as patient hasn't eaten, increase to home dose as per FSG  - start lispro 5u TIDAC, increase as per FSG

## 2019-10-20 NOTE — ED PROVIDER NOTE - CLINICAL SUMMARY MEDICAL DECISION MAKING FREE TEXT BOX
fall 1 day ago - CT head  chest  wnl  ct abd wnl   BG  290   pt is a fall risk  no safe at home per family ? NH placement

## 2019-10-20 NOTE — H&P ADULT - NSICDXPASTSURGICALHX_GEN_ALL_CORE_FT
PAST SURGICAL HISTORY:  No significant past surgical history     No significant past surgical history PAST SURGICAL HISTORY:  No significant past surgical history

## 2019-10-21 DIAGNOSIS — J98.4 OTHER DISORDERS OF LUNG: ICD-10-CM

## 2019-10-21 DIAGNOSIS — D69.6 THROMBOCYTOPENIA, UNSPECIFIED: ICD-10-CM

## 2019-10-21 DIAGNOSIS — T14.8XXA OTHER INJURY OF UNSPECIFIED BODY REGION, INITIAL ENCOUNTER: ICD-10-CM

## 2019-10-21 LAB
ANION GAP SERPL CALC-SCNC: 10 MMOL/L — SIGNIFICANT CHANGE UP (ref 5–17)
APPEARANCE UR: CLEAR — SIGNIFICANT CHANGE UP
BILIRUB UR-MCNC: NEGATIVE — SIGNIFICANT CHANGE UP
BUN SERPL-MCNC: 17 MG/DL — SIGNIFICANT CHANGE UP (ref 7–23)
CALCIUM SERPL-MCNC: 8.4 MG/DL — SIGNIFICANT CHANGE UP (ref 8.4–10.5)
CHLORIDE SERPL-SCNC: 108 MMOL/L — SIGNIFICANT CHANGE UP (ref 96–108)
CO2 SERPL-SCNC: 23 MMOL/L — SIGNIFICANT CHANGE UP (ref 22–31)
COLOR SPEC: YELLOW — SIGNIFICANT CHANGE UP
CREAT SERPL-MCNC: 0.73 MG/DL — SIGNIFICANT CHANGE UP (ref 0.5–1.3)
DIFF PNL FLD: NEGATIVE — SIGNIFICANT CHANGE UP
GLUCOSE BLDC GLUCOMTR-MCNC: 199 MG/DL — HIGH (ref 70–99)
GLUCOSE BLDC GLUCOMTR-MCNC: 204 MG/DL — HIGH (ref 70–99)
GLUCOSE BLDC GLUCOMTR-MCNC: 221 MG/DL — HIGH (ref 70–99)
GLUCOSE BLDC GLUCOMTR-MCNC: 317 MG/DL — HIGH (ref 70–99)
GLUCOSE SERPL-MCNC: 212 MG/DL — HIGH (ref 70–99)
GLUCOSE UR QL: 500
HBA1C BLD-MCNC: 7.9 % — HIGH (ref 4–5.6)
HCT VFR BLD CALC: 42.8 % — SIGNIFICANT CHANGE UP (ref 39–50)
HGB BLD-MCNC: 14.2 G/DL — SIGNIFICANT CHANGE UP (ref 13–17)
KETONES UR-MCNC: NEGATIVE — SIGNIFICANT CHANGE UP
LEUKOCYTE ESTERASE UR-ACNC: NEGATIVE — SIGNIFICANT CHANGE UP
MAGNESIUM SERPL-MCNC: 1.9 MG/DL — SIGNIFICANT CHANGE UP (ref 1.6–2.6)
MCHC RBC-ENTMCNC: 30.4 PG — SIGNIFICANT CHANGE UP (ref 27–34)
MCHC RBC-ENTMCNC: 33.2 GM/DL — SIGNIFICANT CHANGE UP (ref 32–36)
MCV RBC AUTO: 91.6 FL — SIGNIFICANT CHANGE UP (ref 80–100)
NITRITE UR-MCNC: NEGATIVE — SIGNIFICANT CHANGE UP
NRBC # BLD: 0 /100 WBCS — SIGNIFICANT CHANGE UP (ref 0–0)
PH UR: 7 — SIGNIFICANT CHANGE UP (ref 5–8)
PHOSPHATE SERPL-MCNC: 3.2 MG/DL — SIGNIFICANT CHANGE UP (ref 2.5–4.5)
PLATELET # BLD AUTO: 113 K/UL — LOW (ref 150–400)
POTASSIUM SERPL-MCNC: 3.8 MMOL/L — SIGNIFICANT CHANGE UP (ref 3.5–5.3)
POTASSIUM SERPL-SCNC: 3.8 MMOL/L — SIGNIFICANT CHANGE UP (ref 3.5–5.3)
PROT UR-MCNC: NEGATIVE MG/DL — SIGNIFICANT CHANGE UP
RBC # BLD: 4.67 M/UL — SIGNIFICANT CHANGE UP (ref 4.2–5.8)
RBC # FLD: 11.9 % — SIGNIFICANT CHANGE UP (ref 10.3–14.5)
SODIUM SERPL-SCNC: 141 MMOL/L — SIGNIFICANT CHANGE UP (ref 135–145)
SP GR SPEC: 1.01 — SIGNIFICANT CHANGE UP (ref 1–1.03)
UROBILINOGEN FLD QL: 0.2 E.U./DL — SIGNIFICANT CHANGE UP
WBC # BLD: 5.49 K/UL — SIGNIFICANT CHANGE UP (ref 3.8–10.5)
WBC # FLD AUTO: 5.49 K/UL — SIGNIFICANT CHANGE UP (ref 3.8–10.5)

## 2019-10-21 PROCEDURE — 99233 SBSQ HOSP IP/OBS HIGH 50: CPT | Mod: GC

## 2019-10-21 RX ORDER — INSULIN LISPRO 100/ML
5 VIAL (ML) SUBCUTANEOUS
Refills: 0 | Status: DISCONTINUED | OUTPATIENT
Start: 2019-10-21 | End: 2019-10-23

## 2019-10-21 RX ORDER — BACITRACIN ZINC 500 UNIT/G
1 OINTMENT IN PACKET (EA) TOPICAL DAILY
Refills: 0 | Status: DISCONTINUED | OUTPATIENT
Start: 2019-10-21 | End: 2019-10-23

## 2019-10-21 RX ORDER — INSULIN GLARGINE 100 [IU]/ML
35 INJECTION, SOLUTION SUBCUTANEOUS AT BEDTIME
Refills: 0 | Status: DISCONTINUED | OUTPATIENT
Start: 2019-10-21 | End: 2019-10-23

## 2019-10-21 RX ADMIN — Medication 1 APPLICATION(S): at 17:03

## 2019-10-21 RX ADMIN — Medication 5 UNIT(S): at 08:59

## 2019-10-21 RX ADMIN — Medication 8: at 13:47

## 2019-10-21 RX ADMIN — ENOXAPARIN SODIUM 40 MILLIGRAM(S): 100 INJECTION SUBCUTANEOUS at 06:37

## 2019-10-21 RX ADMIN — Medication 2: at 08:59

## 2019-10-21 RX ADMIN — Medication 5 UNIT(S): at 13:48

## 2019-10-21 RX ADMIN — Medication: at 00:20

## 2019-10-21 RX ADMIN — Medication 2: at 17:42

## 2019-10-21 RX ADMIN — Medication 4: at 22:32

## 2019-10-21 RX ADMIN — LISINOPRIL 10 MILLIGRAM(S): 2.5 TABLET ORAL at 06:37

## 2019-10-21 RX ADMIN — INSULIN GLARGINE 35 UNIT(S): 100 INJECTION, SOLUTION SUBCUTANEOUS at 22:33

## 2019-10-21 RX ADMIN — Medication 5 UNIT(S): at 17:42

## 2019-10-21 NOTE — PHYSICAL THERAPY INITIAL EVALUATION ADULT - IMPAIRMENTS CONTRIBUTING IMPAIRED BED MOBILITY, REHAB EVAL
decreased strength/impaired balance impaired balance/decreased strength/impaired postural control/decreased ROM

## 2019-10-21 NOTE — PHYSICAL THERAPY INITIAL EVALUATION ADULT - IMPAIRMENTS CONTRIBUTING TO GAIT DEVIATIONS, PT EVAL
impaired balance/impaired postural control/decreased strength impaired balance/impaired postural control/decreased strength/decreased ROM

## 2019-10-21 NOTE — PHYSICAL THERAPY INITIAL EVALUATION ADULT - GENERAL OBSERVATIONS, REHAB EVAL
Pt recv'd with +heplock IV, bandage over RLE anterior tibia, bandage over R chest. No c/o pain, SOB, dizziness at rest, tolerated session fairly well, CG amb with RW 15ft x 1, 70ft x 1. Impairments include dec endurance, general deconditioning, dynamic balance, safety with AD. Pt left seated with +heplock IV and +chair alarm, JOSE MARIA Ellington aware. FIM gait = 3.

## 2019-10-21 NOTE — PHYSICAL THERAPY INITIAL EVALUATION ADULT - IMPAIRMENTS FOUND, PT EVAL
muscle strength/posture/ergonomics and body mechanics/gait, locomotion, and balance/poor safety awareness

## 2019-10-21 NOTE — PHYSICAL THERAPY INITIAL EVALUATION ADULT - PLANNED THERAPY INTERVENTIONS, PT EVAL
postural re-education/balance training/bed mobility training/strengthening/transfer training/gait training

## 2019-10-21 NOTE — PATIENT PROFILE ADULT - STATED REASON FOR ADMISSION
dementia  status post fall at home..as per family pt claimed he was lying on the floor for several hours. dementia  status post fall at home as per family. pt claimed he was lying on the floor for several hours.

## 2019-10-21 NOTE — PHYSICAL THERAPY INITIAL EVALUATION ADULT - MANUAL MUSCLE TESTING RESULTS, REHAB EVAL
Pt demonstrates >3/5 MMT (B) UE/LE as demonstrated by functional mobility assessment. Additionally, B biceps 4/5, B triceps 4/5, B hip flexors 4-/5.

## 2019-10-21 NOTE — PROGRESS NOTE ADULT - SUBJECTIVE AND OBJECTIVE BOX
INTERVAL HPI/OVERNIGHT EVENTS:    SUBJECTIVE: Patient seen and examined at bedside.    VITAL SIGNS:  ICU Vital Signs Last 24 Hrs  T(C): 37 (21 Oct 2019 12:39), Max: 37 (21 Oct 2019 12:39)  T(F): 98.6 (21 Oct 2019 12:39), Max: 98.6 (21 Oct 2019 12:39)  HR: 69 (21 Oct 2019 15:45) (55 - 90)  BP: 158/78 (21 Oct 2019 15:45) (122/82 - 193/84)  BP(mean): --  ABP: --  ABP(mean): --  RR: 18 (21 Oct 2019 12:39) (16 - 18)  SpO2: 97% (21 Oct 2019 15:45) (96% - 97%)        10-20 @ 07:01  -  10-21 @ 07:00  --------------------------------------------------------  IN: 0 mL / OUT: 625 mL / NET: -625 mL      CAPILLARY BLOOD GLUCOSE      POCT Blood Glucose.: 317 mg/dL (21 Oct 2019 13:44)      PHYSICAL EXAM:    Constitutional: NAD  HEENT: NC/AT; PERRL, anicteric sclera; MMM  Neck: supple, no JVD  Cardiovascular: +S1/S2, RRR  Respiratory: CTA B/L, no W/R/R  Gastrointestinal: abdomen soft, NT/ND; no rebound or guarding; +BSx4  Genitourinary: no suprapubic tenderness or fullness  Extremities: WWP; no LE edema; no clubbing or cyanosis  Vascular: 2+ radial, DP/PT and femoral pulses B/L  Dermatologic: normal color and turgor; no visible rashes  Neurological:     MEDICATIONS:  MEDICATIONS  (STANDING):  BACItracin   Ointment 1 Application(s) Topical daily  dextrose 5%. 1000 milliLiter(s) (50 mL/Hr) IV Continuous <Continuous>  dextrose 50% Injectable 12.5 Gram(s) IV Push once  dextrose 50% Injectable 25 Gram(s) IV Push once  dextrose 50% Injectable 25 Gram(s) IV Push once  enoxaparin Injectable 40 milliGRAM(s) SubCutaneous every 24 hours  insulin lispro (HumaLOG) corrective regimen sliding scale   SubCutaneous Before meals and at bedtime  insulin lispro Injectable (HumaLOG) 5 Unit(s) SubCutaneous three times a day before meals  lisinopril 10 milliGRAM(s) Oral daily    MEDICATIONS  (PRN):  ALBUTerol    90 MICROgram(s) HFA Inhaler 2 Puff(s) Inhalation every 6 hours PRN Shortness of Breath and/or Wheezing  dextrose 40% Gel 15 Gram(s) Oral once PRN Blood Glucose LESS THAN 70 milliGRAM(s)/deciliter  glucagon  Injectable 1 milliGRAM(s) IntraMuscular once PRN Glucose LESS THAN 70 milligrams/deciliter      ALLERGIES:  Allergies    No Known Allergies    Intolerances        LABS:                        14.2   5.49  )-----------( 113      ( 21 Oct 2019 07:45 )             42.8     10-    141  |  108  |  17  ----------------------------<  212<H>  3.8   |  23  |  0.73    Ca    8.4      21 Oct 2019 07:45  Phos  3.2     10-  Mg     1.9     10-    TPro  6.9  /  Alb  3.8  /  TBili  0.4  /  DBili  x   /  AST  16  /  ALT  16  /  AlkPhos  66  10-20      Urinalysis Basic - ( 21 Oct 2019 08:11 )    Color: Yellow / Appearance: Clear / S.015 / pH: x  Gluc: x / Ketone: NEGATIVE  / Bili: Negative / Urobili: 0.2 E.U./dL   Blood: x / Protein: NEGATIVE mg/dL / Nitrite: NEGATIVE   Leuk Esterase: NEGATIVE / RBC: x / WBC x   Sq Epi: x / Non Sq Epi: x / Bacteria: x        RADIOLOGY & ADDITIONAL TESTS: Reviewed. INTERVAL HPI/OVERNIGHT EVENTS: NAOE.    SUBJECTIVE: Patient seen and examined at bedside. Exam limited by language barrier as pt. is Mandarin speaking only with no family at bedside.    VITAL SIGNS:  ICU Vital Signs Last 24 Hrs  T(C): 37 (21 Oct 2019 12:39), Max: 37 (21 Oct 2019 12:39)  T(F): 98.6 (21 Oct 2019 12:39), Max: 98.6 (21 Oct 2019 12:39)  HR: 69 (21 Oct 2019 15:45) (55 - 90)  BP: 158/78 (21 Oct 2019 15:45) (122/82 - 193/84)  BP(mean): --  ABP: --  ABP(mean): --  RR: 18 (21 Oct 2019 12:39) (16 - 18)  SpO2: 97% (21 Oct 2019 15:45) (96% - 97%)        10 @ 07:01  -  10-21 @ 07:00  --------------------------------------------------------  IN: 0 mL / OUT: 625 mL / NET: -625 mL      CAPILLARY BLOOD GLUCOSE      POCT Blood Glucose.: 317 mg/dL (21 Oct 2019 13:44)      PHYSICAL EXAM:    Constitutional: NAD, lying comfortably in bed  HEENT: NC/AT; PERRL, anicteric sclera; dry mucous membranes, bruise on R cheek  Neck: supple, no JVD, no thyromegaly  Cardiovascular: +S1/S2, RRR, no m/g/r  Respiratory: CTA B/L, no W/R/R  Gastrointestinal: abdomen soft, NT/ND; no rebound or guarding; +BSx4  Genitourinary: no suprapubic tenderness or fullness  Extremities: WWP; no LE edema; no clubbing or cyanosis  Vascular: 2+ radial, DP/PT pulses B/L  Dermatologic: abrasion on R chest area with fluid filled blisters and crusting in a non-dermatomal distribution, abrasion on lateral aspect of R knee   Neurological: alert and oriented, but neuro exam limited 2/2 language barrier, cranial nerves grossly intact, no focal deficits appreciated, gait testing deferred    MEDICATIONS:  MEDICATIONS  (STANDING):  BACItracin   Ointment 1 Application(s) Topical daily  dextrose 5%. 1000 milliLiter(s) (50 mL/Hr) IV Continuous <Continuous>  dextrose 50% Injectable 12.5 Gram(s) IV Push once  dextrose 50% Injectable 25 Gram(s) IV Push once  dextrose 50% Injectable 25 Gram(s) IV Push once  enoxaparin Injectable 40 milliGRAM(s) SubCutaneous every 24 hours  insulin lispro (HumaLOG) corrective regimen sliding scale   SubCutaneous Before meals and at bedtime  insulin lispro Injectable (HumaLOG) 5 Unit(s) SubCutaneous three times a day before meals  lisinopril 10 milliGRAM(s) Oral daily    MEDICATIONS  (PRN):  ALBUTerol    90 MICROgram(s) HFA Inhaler 2 Puff(s) Inhalation every 6 hours PRN Shortness of Breath and/or Wheezing  dextrose 40% Gel 15 Gram(s) Oral once PRN Blood Glucose LESS THAN 70 milliGRAM(s)/deciliter  glucagon  Injectable 1 milliGRAM(s) IntraMuscular once PRN Glucose LESS THAN 70 milligrams/deciliter      ALLERGIES:  Allergies    No Known Allergies    Intolerances        LABS:                        14.2   5.49  )-----------( 113      ( 21 Oct 2019 07:45 )             42.8     10-    141  |  108  |  17  ----------------------------<  212<H>  3.8   |  23  |  0.73    Ca    8.4      21 Oct 2019 07:45  Phos  3.2     10-  Mg     1.9     10-    TPro  6.9  /  Alb  3.8  /  TBili  0.4  /  DBili  x   /  AST  16  /  ALT  16  /  AlkPhos  66  10-20      Urinalysis Basic - ( 21 Oct 2019 08:11 )    Color: Yellow / Appearance: Clear / S.015 / pH: x  Gluc: x / Ketone: NEGATIVE  / Bili: Negative / Urobili: 0.2 E.U./dL   Blood: x / Protein: NEGATIVE mg/dL / Nitrite: NEGATIVE   Leuk Esterase: NEGATIVE / RBC: x / WBC x   Sq Epi: x / Non Sq Epi: x / Bacteria: x        RADIOLOGY & ADDITIONAL TESTS: Reviewed.

## 2019-10-21 NOTE — CONSULT NOTE ADULT - SUBJECTIVE AND OBJECTIVE BOX
Patient is a 74y old  Male who presents with a chief complaint of Fall (21 Oct 2019 09:41)       HPI:  Patient is a 75 yo M with a PMH of DM and HTN brought to the ED s/p a fall on 10/19. Patient accompanied by family who witnessed a mechanical fall at home, but prior to that he was ??found on the floor under the bed as per ED note. He reports he tripped and fell on his right side and hit his knee, R cheek and chest, he was on the floor and his wife lifted him up. Patient with recent admission at [facility] in 2019 for a fall, was discharged to [facility]. He reports that he feels unsteady and has had multiple falls due to his knees giving out. He has OA, worse in his R knee and reports his knee yee and gives out. Denies any LOC. Reports R knee pain. Denies fever, chills, chest pain, palpitations, SOB, abdominal pain, nausea/vomiting, diarrhea/constipation.     Vitals in the ED: T 98.5, HR 69, /81, RR 18, SpO2 98% on RA. Repeat vitals: T 97.8, HR 90, /70, RR 16, SpO2 97% on RA  Labs: WBC 6.49, Hgb 13.9, Hct 42.4, , Na 140, K 4.0, Cl 104, CO2 25, BUN/Cr 24/0.78, Glu 290  EKG: NSR, inferior infarct, age undetermined  CXR: no acute infiltrates  ED course: CT head and CT c-spine negative for fractures. CTAP with no acute pathology. He was started on NS @125cc/hr (20 Oct 2019 21:19)      PAST MEDICAL & SURGICAL HISTORY:  Type 2 diabetes mellitus: Diagnosed roughly 10 years ago and on insulin since  HTN (hypertension)  Diabetic nephropathy  HTN (hypertension)  Hypercholesteremia  Diabetes  No significant past surgical history      MEDICATIONS  (STANDING):  BACItracin   Ointment 1 Application(s) Topical daily  dextrose 5%. 1000 milliLiter(s) (50 mL/Hr) IV Continuous <Continuous>  dextrose 50% Injectable 12.5 Gram(s) IV Push once  dextrose 50% Injectable 25 Gram(s) IV Push once  dextrose 50% Injectable 25 Gram(s) IV Push once  enoxaparin Injectable 40 milliGRAM(s) SubCutaneous every 24 hours  insulin lispro (HumaLOG) corrective regimen sliding scale   SubCutaneous Before meals and at bedtime  insulin lispro Injectable (HumaLOG) 5 Unit(s) SubCutaneous three times a day before meals  lisinopril 10 milliGRAM(s) Oral daily    MEDICATIONS  (PRN):  ALBUTerol    90 MICROgram(s) HFA Inhaler 2 Puff(s) Inhalation every 6 hours PRN Shortness of Breath and/or Wheezing  dextrose 40% Gel 15 Gram(s) Oral once PRN Blood Glucose LESS THAN 70 milliGRAM(s)/deciliter  glucagon  Injectable 1 milliGRAM(s) IntraMuscular once PRN Glucose LESS THAN 70 milligrams/deciliter      FAMILY HISTORY:  FH: diabetes mellitus: mother and father      CBC Full  -  ( 21 Oct 2019 07:45 )  WBC Count : 5.49 K/uL  RBC Count : 4.67 M/uL  Hemoglobin : 14.2 g/dL  Hematocrit : 42.8 %  Platelet Count - Automated : 113 K/uL  Mean Cell Volume : 91.6 fl  Mean Cell Hemoglobin : 30.4 pg  Mean Cell Hemoglobin Concentration : 33.2 gm/dL  Auto Neutrophil # : x  Auto Lymphocyte # : x  Auto Monocyte # : x  Auto Eosinophil # : x  Auto Basophil # : x  Auto Neutrophil % : x  Auto Lymphocyte % : x  Auto Monocyte % : x  Auto Eosinophil % : x  Auto Basophil % : x      10-21    141  |  108  |  17  ----------------------------<  212<H>  3.8   |  23  |  0.73    Ca    8.4      21 Oct 2019 07:45  Phos  3.2     10-21  Mg     1.9     10-21    TPro  6.9  /  Alb  3.8  /  TBili  0.4  /  DBili  x   /  AST  16  /  ALT  16  /  AlkPhos  66  10-20      Urinalysis Basic - ( 21 Oct 2019 08:11 )    Color: Yellow / Appearance: Clear / S.015 / pH: x  Gluc: x / Ketone: NEGATIVE  / Bili: Negative / Urobili: 0.2 E.U./dL   Blood: x / Protein: NEGATIVE mg/dL / Nitrite: NEGATIVE   Leuk Esterase: NEGATIVE / RBC: x / WBC x   Sq Epi: x / Non Sq Epi: x / Bacteria: x          Radiology:    < from: CT Head No Cont (10.20.19 @ 17:31) >    EXAM:  CT BRAIN                          PROCEDURE DATE:  10/20/2019          INTERPRETATION:  Sujatha KUMAR MD, have reviewed the images and the report   and agree with the findings. Prominent left jugular bulb. Osteopenia. No   evidence of acute intracranial injury.    INDICATIONS: fall found down 1 day ago    TECHNIQUE: Serial axial images were obtained from the skull base to the   vertex without the use of intravenous contrast. Sagittal and coronal   reformats were also reviewed.    COMPARISON EXAMINATION: CT head 2019    FINDINGS:    VENTRICLES AND SULCI: Age appropriate parenchymal volume. No   hydrocephalus.  INTRA-AXIAL: No mass effect, acute hemorrhage, or midline shift. There is   preservation of gray-white matter differentiation without CT evidence of   acute transcortical infarction. Unchanged encephalomalacia within left   inferior frontal lobe. Patchy areas of hypodensity within the   periventricular and subcortical white matter consistent with small vessel   ischemic changes.  EXTRA-AXIAL: No extra-axial fluid collection is present.   VISUALIZED SINUSES: Scattered ethmoid air cell mucosal thickening.  VISUALIZED MASTOIDS: Well-aerated.  CALVARIUM: No fracture.  MISCELLANEOUS: None.    IMPRESSION:   No acute intracranial hemorrhage or calvarial fracture. Small vessel   ischemic changes and left inferior frontal lobe encephalomalacia. No   significant change from 2019.        < from: CT Cervical Spine No Cont (10.20.19 @ 17:31) >  EXAM:  CT CERVICAL SPINE                          PROCEDURE DATE:  10/20/2019          INTERPRETATION:  Sujatha KUMAR MD, have reviewed the images and the report   and agree with the findings with the additional modification: Linear   lucency seen in the anterior right C1 foramen transversarium that appears   well-corticated (axial image #16 series 7 and coronal image #14 series 8)   and likely congenital. No acute fractures are identified within the   remaining cervical spine. However if thereis further clinical concern,   would recommend CTA for further evaluation of vertebral artery dissection.     PROCEDURE: CT Cervical spine without contrast    INDICATION: Unresponsive, found down    TECHNIQUE: Multiple axial sections were obtained from the mid orbits to   the sternoclavicular joint. Sagittal and coronal reformats were obtained   from the axial data set. The images were reviewed in soft tissue and bone   windows.    COMPARISON: None    FINDINGS: There is loss of the normal cervical lordosis which may be   secondary to positioning  .No spondylolisthesis. No facet subluxation.      Levoscoliosis of the cervical spine.     The osseous structures are intact without fracture. There is mild   vertebral body height loss at C5.  Thereis mild intervertebral disc   space loss at C5-C6 and C6-C7. There is mild bilateral neural foraminal   narrowing at C5-C6 secondary to uncovertebral and facet hypertrophy.   There is moderate right neural foraminal narrowing at C6-C7 secondary to   uncovertebral and facet hypertrophy. There is mild multilevel spinal   canal stenosis at C3-C4, C4-C5, C5-C6, and C6-C7. There is mild posterior   disc bulge asymmetrically to the right at C4-C5.    The prevertebral soft tissues are within normal limits. There is right   apical lung scarring.      IMPRESSION:   No fracture.    Degenerative changes as above.        < from: CT Abdomen and Pelvis w/ IV Cont (10.20.19 @ 17:32) >  EXAM:  CT ABDOMEN AND PELVIS IC                          EXAM:  CT CHEST IC                          PROCEDURE DATE:  10/20/2019          INTERPRETATION:  CT of the chest, abdomen and pelvis with contrast dated   10/20/2019 5:32 PM    INDICATION: Fall, 1 day ago.    TECHNIQUE: CT of the chest, abdomen and pelvis was performed using   intravenous contrast.  Axial, sagittal and coronal images were produced   and reviewed.    INTRAVENOUS CONTRAST: 95 cc of Optiray 350.    PRIOR STUDIES: CT abdomenand pelvis 2018 and CT of chest 2019    FINDINGS: The heart is at the upper limits of normal in size No   pericardial effusion is seen. No thoracic aortic aneurysm is seen. No   mediastinal, hilar or axillary lymphadenopathy is seen.    Evaluation of the pulmonary parenchyma demonstrates a 2.5 cm area of   patchy peribronchial consolidation in the posterior aspect of the right   upper lobe with somewhat spiculated margin.  No pleural effusions are   seen.    Images of the upper abdomen demonstrate hepatic steatosis. Hypodense   change along the falciform ligament is likely due to preferential focal   fatty change. No radiopaque stones are seen in the gallbladder.  The   pancreas is normal in appearance.  No splenic abnormalities are seen.    The adrenal glands are unremarkable. There is a 3.6 cm cyst in the left   kidney. The kidneys are otherwise unremarkable. No aortic aneurysm is   seen. No lymphadenopathy is seen.     Evaluation of the bowel demonstrates a normal appendix. There is no bowel   obstruction or free air. No ascites is seen.     Images of the pelvis demonstrate no bladder abnormality. Prostate is not   significantly enlarged.    Evaluation of the osseous structures demonstrates degenerative changes.   No acutefractures identified.      IMPRESSION:  No evidence of acute traumatic visceral injury or acute fracture.    2.5 cm patchy pulmonary consolidation in the right upper lobe is   suspicious for neoplasm. Recommend PET/CT or biopsy.            Vital Signs Last 24 Hrs  T(C): 36.7 (21 Oct 2019 05:56), Max: 36.7 (21 Oct 2019 05:56)  T(F): 98 (21 Oct 2019 05:56), Max: 98 (21 Oct 2019 05:56)  HR: 62 (21 Oct 2019 09:33) (55 - 90)  BP: 146/74 (21 Oct 2019 09:33) (122/82 - 193/84)  BP(mean): --  RR: 17 (21 Oct 2019 05:56) (16 - 17)  SpO2: 96% (21 Oct 2019 09:33) (96% - 97%)    REVIEW OF SYSTEMS:    CONSTITUTIONAL: No fever, weight loss, or fatigue  EYES: No eye pain, visual disturbances, or discharge  ENMT:  No difficulty hearing, tinnitus, vertigo; No sinus or throat pain  NECK: No pain or stiffness  BREASTS: No pain, masses, or nipple discharge  RESPIRATORY: No cough, wheezing, chills or hemoptysis; No shortness of breath  CARDIOVASCULAR: No chest pain, palpitations, dizziness, or leg swelling  GASTROINTESTINAL: No abdominal or epigastric pain. No nausea, vomiting, or hematemesis; No diarrhea or constipation. No melena or hematochezia.  GENITOURINARY: No dysuria, frequency, hematuria, or incontinence  NEUROLOGICAL: No headaches, memory loss, loss of strength, numbness, or tremors  SKIN: No itching, burning, rashes, or lesions   LYMPH NODES: No enlarged glands  ENDOCRINE: No heat or cold intolerance; No hair loss  MUSCULOSKELETAL: No joint pain or swelling; No muscle, back, or extremity pain  PSYCHIATRIC: No depression, anxiety, mood swings, or difficulty sleeping  HEME/LYMPH: No easy bruising, or bleeding gums  ALLERGY AND IMMUNOLOGIC: No hives or eczema  VASCULAR: no swelling, erythema      Physical Exam: 75 yo  gentleman lying in semi Mora's position, w/o complaints    Head: normocephalic, atraumatic    Eyes: PERRLA, EOMI, no nystagmus, sclera anicteric    ENT: nasal discharge, uvula midline, no oropharyngeal erythema/exudate    Neck: supple, negative JVD, negative carotid bruits, no thyromegaly    Chest: R CW abrasion, + crusted blisters    Cardiovascular: regular rate and rhythm, neg murmurs/rubs/gallops    Abdomen: soft, non distended, non tender, negative rebound/guarding, normal bowel sounds, neg hepatosplenomegaly    Extremities: R knee abrasion w/o effusion, WWP, neg cyanosis/clubbing/edema, negative calf tenderness to palpation, negative Tatiana's sign    :     Neurologic Exam:    Motor Exam:    Upper Extremities:     RIght:  no focal weakness               negative drift    Left :    no focal weakness               negative drift    Lower Extremities:                 Right:    no focal weakness                 Left:      no focal weakness                   Sensory:    intact to LT/PP in all UE/LE dermatomes    DTR:            = biceps/     triceps/     brachioradialis                      = patella/   medial hamstring/ankle                      neg clonus                      neg Babinski                        Gait:  not tested        PM&R Impression:    1) s/p fall  2) no focal weakness          Recommendations:    1) Physical therapy focusing on therapeutic exercises, bed mobility/transfer out of bed evaluation, progressive ambulation with assistive devices prn.    2) Current Disposition Plan/Recs: pending functional progress

## 2019-10-21 NOTE — PHYSICAL THERAPY INITIAL EVALUATION ADULT - CRITERIA FOR SKILLED THERAPEUTIC INTERVENTIONS
therapy frequency/anticipated discharge recommendation/functional limitations in following categories/risk reduction/prevention/rehab potential/predicted duration of therapy intervention/impairments found/anticipated equipment needs at discharge

## 2019-10-21 NOTE — CONSULT NOTE ADULT - ASSESSMENT
per Internal Medicine    75 yo M with a PMH of DM and HTN brought to the ED s/p a fall, admitted for further management    Problem/Plan - 1:  ·  Problem: Fall.  Plan: Patient with a hx of multiple falls with recent admission in July 2019 for a fall, discharged to [facility]. He now presents s/p a witness fall at home, with a prior unwitnessed fall. Denies chest pain, palpitations preceding the fall. EKG w/ NSR no ischemic changes. Etiology likely in the setting of unsteady gait 2/2 osteoarthritis   - CT head: no hemorrhage or infarct  - CT c-spine: no fracture  - Fall precautions  - TSH, B12 normal in July 2019.     Problem/Plan - 2:  ·  Problem: Pulmonary lesion of right side of chest. Plan: Small focal nodular density right upper lobe posteriorly measuring 12 mm, indeterminate.  - consider repeat CT at 3 months, PET/CT or biopsy    ADDENDUM: suspicious for neoplasm on official CT, recommend bx  and/or PET  #Renal cyst  3.7 cm left renal cyst, otherwise normal kidneys  - kidney function wnl  - no acute intervention.    Problem/Plan - 3:  ·  Problem: Abrasion. Plan: Abrasion on R chest area, with fluid filled blisters and crusting, + R knee abrasion s/p a fall  - bacitracin ointment.    Problem/Plan - 4:  ·  Problem: Type 2 diabetes mellitus. Plan: Hx of uncontrolled DM (A1c 11 in July 2019), previously reported noncompliant with medications. Was discharged on Lantus 35 units qhs and lispro 10u TIDAC in July 2019; also on Janumet 50-1000mg and Jardiance 25mg at home  - mISS  - monitor FSG  - dosed for Lantus 25u qhs as patient hasn't eaten, increase to home dose as per FSG  - start lispro 5u TIDAC, increase as per FSG.    Problem/Plan - 5:  ·  Problem: Thrombocytopenia. Plan: PLTs 114 on ED presentation, as per chart review pt with thrombocytopenia in July 2019, with PLTs 95 - 107, which improved to 130s-->152 on discharge. Unclear etiology, however currently w/o any s/s of bleeding  - monitor PLTs  - will consider w/up if thrombocytopenia worsens    ADDENDUM: dc lovenox if platelets decreasing.    Problem/Plan - 6:  Problem: HTN (hypertension).Plan: On lisinopril 10mg qd at home   - c/w home med.    Problem/Plan - 7:  ·  Problem: Nutrition, metabolism, and development symptoms.  Plan: F: NS @125cc/hr  E: replete PRN  N: DASH/TLC/CC.     Problem/Plan - 8:  ·  Problem: Prophylactic measure.  Plan: DVT ppx: Lovenox 40mg SC qd  GI ppx: none    Dispo: [facility]  Code: FULL.

## 2019-10-21 NOTE — PHYSICAL THERAPY INITIAL EVALUATION ADULT - ACTIVE RANGE OF MOTION EXAMINATION, REHAB EVAL
bilateral lower extremity Active ROM was WNL (within normal limits)/bilateral  lower extremity Active ROM was WFL (within functional limits)

## 2019-10-21 NOTE — PHYSICAL THERAPY INITIAL EVALUATION ADULT - PREDICTED DURATION OF THERAPY (DAYS/WKS), PT EVAL
Pt will benefit from further PT follow up to improve functional mobility, prevent further deconditioning.

## 2019-10-21 NOTE — PHYSICAL THERAPY INITIAL EVALUATION ADULT - ADDITIONAL COMMENTS
Pt lives on the 2nd floor, no elevator, with his wife. Pt reports not using AD when in home, due to short distances and the ability to sit down for a rest break. Pt uses RW when he leaves his home, such as to get groceries with his wife. He reports he has most difficulty with donning shoes, socks, pants and with reaching items off the ground causing falls. He does not own a shower chair but currently uses a plastic bench to sit when needed in the shower. Pt lives on the 2nd floor, no elevator, with his wife. Pt reports not using AD when in home, due to short distances and the ability to sit down for a rest break. Pt uses RW when he leaves his home, such as to get groceries with his wife. He reports he has most difficulty with donning shoes, socks, pants and with reaching items off the ground causing falls. He also reports difficulty with getting out of bed and needs assistance at home when available. Pt reports decreased time with stair negotiation. He does not own a shower chair but currently uses a plastic bench to sit when needed in the shower. Pt lives on the 2nd floor, no elevator, with his wife and daughter. Pt reports not using AD when in home, due to short distances and the ability to sit down for a rest break. Pt uses SC when he leaves his home, such as to get groceries with his wife. He reports he has most difficulty with donning shoes, socks, pants and with reaching items off the ground causing falls. He also reports difficulty with getting out of bed and needs assistance at home when available. Pt reports increased time with stair negotiation. He does not own a shower chair but currently uses a plastic bench to sit when needed in the shower.

## 2019-10-22 LAB
ANION GAP SERPL CALC-SCNC: 10 MMOL/L — SIGNIFICANT CHANGE UP (ref 5–17)
BUN SERPL-MCNC: 15 MG/DL — SIGNIFICANT CHANGE UP (ref 7–23)
CALCIUM SERPL-MCNC: 8.6 MG/DL — SIGNIFICANT CHANGE UP (ref 8.4–10.5)
CHLORIDE SERPL-SCNC: 103 MMOL/L — SIGNIFICANT CHANGE UP (ref 96–108)
CO2 SERPL-SCNC: 26 MMOL/L — SIGNIFICANT CHANGE UP (ref 22–31)
CREAT SERPL-MCNC: 0.65 MG/DL — SIGNIFICANT CHANGE UP (ref 0.5–1.3)
CULTURE RESULTS: SIGNIFICANT CHANGE UP
GLUCOSE BLDC GLUCOMTR-MCNC: 115 MG/DL — HIGH (ref 70–99)
GLUCOSE BLDC GLUCOMTR-MCNC: 158 MG/DL — HIGH (ref 70–99)
GLUCOSE BLDC GLUCOMTR-MCNC: 239 MG/DL — HIGH (ref 70–99)
GLUCOSE BLDC GLUCOMTR-MCNC: 328 MG/DL — HIGH (ref 70–99)
GLUCOSE BLDC GLUCOMTR-MCNC: 87 MG/DL — SIGNIFICANT CHANGE UP (ref 70–99)
GLUCOSE SERPL-MCNC: 178 MG/DL — HIGH (ref 70–99)
HCT VFR BLD CALC: 41.9 % — SIGNIFICANT CHANGE UP (ref 39–50)
HGB BLD-MCNC: 14.7 G/DL — SIGNIFICANT CHANGE UP (ref 13–17)
MAGNESIUM SERPL-MCNC: 2.1 MG/DL — SIGNIFICANT CHANGE UP (ref 1.6–2.6)
MCHC RBC-ENTMCNC: 31.1 PG — SIGNIFICANT CHANGE UP (ref 27–34)
MCHC RBC-ENTMCNC: 35.1 GM/DL — SIGNIFICANT CHANGE UP (ref 32–36)
MCV RBC AUTO: 88.8 FL — SIGNIFICANT CHANGE UP (ref 80–100)
NRBC # BLD: 0 /100 WBCS — SIGNIFICANT CHANGE UP (ref 0–0)
PLATELET # BLD AUTO: 131 K/UL — LOW (ref 150–400)
POTASSIUM SERPL-MCNC: 3.8 MMOL/L — SIGNIFICANT CHANGE UP (ref 3.5–5.3)
POTASSIUM SERPL-SCNC: 3.8 MMOL/L — SIGNIFICANT CHANGE UP (ref 3.5–5.3)
RBC # BLD: 4.72 M/UL — SIGNIFICANT CHANGE UP (ref 4.2–5.8)
RBC # FLD: 11.9 % — SIGNIFICANT CHANGE UP (ref 10.3–14.5)
SODIUM SERPL-SCNC: 139 MMOL/L — SIGNIFICANT CHANGE UP (ref 135–145)
SPECIMEN SOURCE: SIGNIFICANT CHANGE UP
WBC # BLD: 5.03 K/UL — SIGNIFICANT CHANGE UP (ref 3.8–10.5)
WBC # FLD AUTO: 5.03 K/UL — SIGNIFICANT CHANGE UP (ref 3.8–10.5)

## 2019-10-22 PROCEDURE — 99233 SBSQ HOSP IP/OBS HIGH 50: CPT | Mod: GC

## 2019-10-22 RX ADMIN — Medication 5 UNIT(S): at 18:20

## 2019-10-22 RX ADMIN — Medication 2: at 08:35

## 2019-10-22 RX ADMIN — Medication 5 UNIT(S): at 08:35

## 2019-10-22 RX ADMIN — LISINOPRIL 10 MILLIGRAM(S): 2.5 TABLET ORAL at 06:07

## 2019-10-22 RX ADMIN — Medication 4: at 23:00

## 2019-10-22 RX ADMIN — ENOXAPARIN SODIUM 40 MILLIGRAM(S): 100 INJECTION SUBCUTANEOUS at 06:07

## 2019-10-22 RX ADMIN — Medication 1 APPLICATION(S): at 14:27

## 2019-10-22 RX ADMIN — Medication 8: at 14:27

## 2019-10-22 RX ADMIN — Medication 5 UNIT(S): at 14:27

## 2019-10-22 RX ADMIN — INSULIN GLARGINE 35 UNIT(S): 100 INJECTION, SOLUTION SUBCUTANEOUS at 23:01

## 2019-10-22 NOTE — PROGRESS NOTE ADULT - PROBLEM SELECTOR PLAN 1
Patient with a hx of multiple falls with recent admission in July 2019 for a fall, discharged to [facility]. He now presents s/p a witness fall at home, with a prior unwitnessed fall. Denies chest pain, palpitations preceding the fall. EKG w/ NSR no ischemic changes. Etiology likely in the setting of unsteady gait 2/2 osteoarthritis   - CT head: no hemorrhage or infarct  - CT c-spine: no fracture  - PT consult - recommending [facility]  - Fall precautions  - TSH, B12 normal in July 2019  - need to obtain collateral
Patient with a hx of multiple falls with recent admission in July 2019 for a fall, discharged to [facility]. He now presents s/p a witness fall at home, with a prior unwitnessed fall. Denies chest pain, palpitations preceding the fall. EKG w/ NSR no ischemic changes. Etiology likely in the setting of unsteady gait 2/2 osteoarthritis   - CT head: no hemorrhage or infarct  - CT c-spine: no fracture  - PT recommendations: dc to [facility]  - Fall precautions  - TSH, B12 normal in July 2019

## 2019-10-22 NOTE — PROGRESS NOTE ADULT - PROBLEM SELECTOR PLAN 3
Abrasion on R chest area, with fluid filled blisters and crusting, + R knee abrasion s/p a fall  - bacitracin ointment
Abrasion on R chest area, with fluid filled blisters and crusting, + R knee abrasion s/p a fall  - bacitracin ointment

## 2019-10-22 NOTE — PROGRESS NOTE ADULT - SUBJECTIVE AND OBJECTIVE BOX
Physical Medicine and Rehabilitation Progress Note:    Patient is a 74y old  Male who presents with a chief complaint of Fall (22 Oct 2019 15:17)      HPI:  Patient is a 73 yo M with a PMH of DM and HTN brought to the ED s/p a fall on 10/19. Patient accompanied by family who witnessed a mechanical fall at home, but prior to that he was ??found on the floor under the bed as per ED note. He reports he tripped and fell on his right side and hit his knee, R cheek and chest, he was on the floor and his wife lifted him up. Patient with recent admission at [facility] in July 2019 for a fall, was discharged to [facility]. He reports that he feels unsteady and has had multiple falls due to his knees giving out. He has OA, worse in his R knee and reports his knee yee and gives out. Denies any LOC. Reports R knee pain. Denies fever, chills, chest pain, palpitations, SOB, abdominal pain, nausea/vomiting, diarrhea/constipation.     Vitals in the ED: T 98.5, HR 69, /81, RR 18, SpO2 98% on RA. Repeat vitals: T 97.8, HR 90, /70, RR 16, SpO2 97% on RA  Labs: WBC 6.49, Hgb 13.9, Hct 42.4, , Na 140, K 4.0, Cl 104, CO2 25, BUN/Cr 24/0.78, Glu 290  EKG: NSR, inferior infarct, age undetermined  CXR: no acute infiltrates  ED course: CT head and CT c-spine negative for fractures. CTAP with no acute pathology. He was started on NS @125cc/hr (20 Oct 2019 21:19)                            14.7   5.03  )-----------( 131      ( 22 Oct 2019 06:53 )             41.9       10-22    139  |  103  |  15  ----------------------------<  178<H>  3.8   |  26  |  0.65    Ca    8.6      22 Oct 2019 06:53  Phos  3.2     10-21  Mg     2.1     10-22      Vital Signs Last 24 Hrs  T(C): 36.8 (22 Oct 2019 12:13), Max: 36.8 (21 Oct 2019 21:22)  T(F): 98.3 (22 Oct 2019 12:13), Max: 98.3 (21 Oct 2019 21:22)  HR: 69 (22 Oct 2019 12:13) (54 - 69)  BP: 131/80 (22 Oct 2019 12:13) (131/80 - 166/76)  BP(mean): --  RR: 18 (22 Oct 2019 12:13) (17 - 18)  SpO2: 99% (22 Oct 2019 12:13) (98% - 99%)    MEDICATIONS  (STANDING):  BACItracin   Ointment 1 Application(s) Topical daily  dextrose 5%. 1000 milliLiter(s) (50 mL/Hr) IV Continuous <Continuous>  dextrose 50% Injectable 12.5 Gram(s) IV Push once  dextrose 50% Injectable 25 Gram(s) IV Push once  dextrose 50% Injectable 25 Gram(s) IV Push once  enoxaparin Injectable 40 milliGRAM(s) SubCutaneous every 24 hours  insulin glargine Injectable (LANTUS) 35 Unit(s) SubCutaneous at bedtime  insulin lispro (HumaLOG) corrective regimen sliding scale   SubCutaneous Before meals and at bedtime  insulin lispro Injectable (HumaLOG) 5 Unit(s) SubCutaneous three times a day before meals  lisinopril 10 milliGRAM(s) Oral daily    MEDICATIONS  (PRN):  ALBUTerol    90 MICROgram(s) HFA Inhaler 2 Puff(s) Inhalation every 6 hours PRN Shortness of Breath and/or Wheezing  dextrose 40% Gel 15 Gram(s) Oral once PRN Blood Glucose LESS THAN 70 milliGRAM(s)/deciliter  glucagon  Injectable 1 milliGRAM(s) IntraMuscular once PRN Glucose LESS THAN 70 milligrams/deciliter    Currently Undergoing Physical Therapy at bedside.    Initial Functional Status Assessment:    Previous Level of Function:     · Ambulation Skills  needs device; mainly in community    · Transfer Skills  needed assist    · ADL Skills  needed assist    · Work/Leisure Activity  needs device and assist    · Additional Comments  Pt lives on the 2nd floor, no elevator, with his wife and daughter. Pt reports not using AD when in home, due to short distances and the ability to sit down for a rest break. Pt uses SC when he leaves his home, such as to get groceries with his wife. He reports he has most difficulty with donning shoes, socks, pants and with reaching items off the ground causing falls. He also reports difficulty with getting out of bed and needs assistance at home when available. Pt reports increased time with stair negotiation. He does not own a shower chair but currently uses a plastic bench to sit when needed in the shower.            Cognitive Status Examination:   · Orientation  unable to ID date, month, year; person; situation          · Level of Consciousness  alert; flat affect          · Follows Commands and Answers Questions  100% of the time; when using     · Personal Safety and Judgment  impaired; at risk behaviors demonstrated; technique with RW during transfers and ambulation, urinating in bed            Skin:   Skin:  · Skin  WDL except     · Skin Temperature  warm    · Skin Moisture  dry    · Skin Integrity  abrasions R chest R knee with gauze C/D/I      Range of Motion Exam:   · Active Range of Motion Examination  bilateral lower extremity Active ROM was WNL (within normal limits); bilateral  lower extremity Active ROM was WFL (within functional limits)      Manual Muscle Testing:   · Manual Muscle Testing Results  Pt demonstrates >3/5 MMT (B) UE/LE as demonstrated by functional mobility assessment. Additionally, B biceps 4/5, B triceps 4/5, B hip flexors 4-/5.      Bed Mobility: Rolling/Turning:     · Level of [place]  moderate assist (50% patients effort)    · Physical Assist/Nonphysical Assist  1 person assist; verbal cues; set-up required    · Assistive Device  bed rails      Bed Mobility: Sit to Supine:     · Level of [place]  TBA      Bed Mobility: Supine to Sit:     · Level of [place]  moderate assist (50% patients effort)    · Physical Assist/Nonphysical Assist  1 person assist; verbal cues; set-up required    · Assistive Device  bed rails      Bed Mobility Analysis:     · Bed Mobility Limitations  decreased ability to use arms for pushing/pulling; decreased ability to use legs for bridging/pushing; impaired ability to control trunk for mobility    · Impairments Contributing to Impaired Bed Mobility  decreased strength; impaired balance; impaired postural control; decreased ROM            Transfer: Bed to Chair:     Transfer Skill: Bed to Chair   · Level of [place]  TBA      Transfer: Chair to Bed:     · Level of [place]  TBA      Transfer: Sit to Stand:     · Level of [place]  minimum assist (75% patients effort)    · Physical Assist/Nonphysical Assist  2 person assist; verbal cues; set-up required    · Weight-Bearing Restrictions  full weight-bearing    · Assistive Device  rolling walker      Transfer: Stand to Sit:     · Level of [place]  minimum assist (75% patients effort)    · Physical Assist/Nonphysical Assist  1 person assist; verbal cues; set-up required    · Weight-Bearing Restrictions  full weight-bearing    · Assistive Device  rolling walker      Sit/Stand Transfer Safety Analysis:     · Transfer Safety Concerns Noted  decreased balance during turns; decreased safety awareness; stepping too far from assistive device    · Impairments Contributing to Impaired Transfers  impaired balance; impaired postural control; decreased strength      Gait Skills:     · Level of [place]  minimum assist (75% patients effort)    · Physical Assist/Nonphysical Assist  1 person assist; verbal cues; set-up required; navigation of RW    · Weight-Bearing Restrictions  full weight-bearing    · Assistive Device  rolling walker    · Gait Distance  15ft x1 to bathroom, 70ft x1      Gait Analysis:     · Gait Pattern Used  3-point gait    · Gait Deviations Noted  decreased elvis; decreased step length; decreased stride length    · Impairments Contributing to Gait Deviations  impaired balance; impaired postural control; decreased strength; decreased ROM            Stair Negotiation:     · Level of [place]  TBA      Balance Skills Assessment:     · Sitting Balance: Static  fair balance    · Sitting Balance: Dynamic  fair balance    · Sit-to-Stand Balance  fair balance    · Standing Balance: Static  fair balance  stood at toilet CG and unilateral UE support ~2min          · Standing Balance: Dynamic  fair balance    · Systems Impairment Contributing to Balance Disturbance  musculoskeletal; Visual and vestibular systems TBA    · Identified Impairments Contributing to Balance Disturbance  decreased strength; impaired postural control      Sensory Examination:   Sensory Examination:    Grossly Intact:   · Gross Sensory Examination  Grossly Intact; Left LE; Right LE; to light touch              Light Touch Sensation:   · Left LE  within normal limits    · Right LE  within normal limits        Clinical Impressions:   · Criteria for Skilled Therapeutic Interventions  impairments found; functional limitations in following categories; risk reduction/prevention; rehab potential; therapy frequency; predicted duration of therapy intervention; anticipated equipment needs at discharge; anticipated discharge recommendation    · Impairments Found (describe specific impairments)  gait, locomotion, and balance; muscle strength; poor safety awareness; posture; ergonomics and body mechanics    · Functional Limitations in Following Categories (describe specific limitations)  self-care; home management; community/leisure    · Risk Reduction/Prevention (Describe Specific Areas of risk reduction/prevention)  risk factors; recurrence of condition; secondary impairments    · Risk Areas  fall; safety awareness    · Rehab Potential  good, to achieve stated therapy goals          PM&R Impression: as above    Current Disposition Plan Recommendations: subacute rehab placement

## 2019-10-22 NOTE — PROGRESS NOTE ADULT - PROBLEM SELECTOR PLAN 2
Small focal nodular density right upper lobe posteriorly measuring 12 mm, indeterminate.  - consider repeat CT at 3 months, PET/CT or biopsy    ADDENDUM: suspicious for neoplasm on official CT, recommend bx  and/or PET  #Renal cyst  3.7 cm left renal cyst, otherwise normal kidneys  - kidney function wnl  - no acute intervention
Small focal nodular density right upper lobe posteriorly measuring 12 mm, indeterminate.  - consider repeat CT at 3 months, PET/CT or biopsy    ADDENDUM: suspicious for neoplasm on official CT, recommend bx  and/or PET  #Renal cyst  3.7 cm left renal cyst, otherwise normal kidneys  - kidney function wnl  - no acute intervention

## 2019-10-22 NOTE — PROGRESS NOTE ADULT - PROBLEM SELECTOR PLAN 5
PLTs 114 on ED presentation, as per chart review pt with thrombocytopenia in July 2019, with PLTs 95 - 107, which improved to 130s-->152 on discharge. Unclear etiology, however currently w/o any s/s of bleeding  - monitor PLTs  - will consider w/up if thrombocytopenia worsens    ADDENDUM: dc lovenox if platelets decreasing
PLTs 114 on ED presentation, as per chart review pt with thrombocytopenia in July 2019, with PLTs 95 - 107, which improved to 130s-->152 on discharge. Unclear etiology, however currently w/o any s/s of bleeding  - monitor PLTs  - will consider w/up if thrombocytopenia worsens    ADDENDUM: dc lovenox if platelets decreasing

## 2019-10-22 NOTE — PROGRESS NOTE ADULT - PROBLEM SELECTOR PLAN 4
Hx of uncontrolled DM (A1c 11 in July 2019), previously reported noncompliant with medications. Was discharged on Lantus 35 units qhs and lispro 10u TIDAC in July 2019; also on Janumet 50-1000mg and Jardiance 25mg at home  - mISS  - monitor FSG  - dosed for Lantus 25u qhs as patient hasn't eaten, increase to home dose as per FSG  - start lispro 5u TIDAC, increase as per FSG
Hx of uncontrolled DM (A1c 11 in July 2019), previously reported noncompliant with medications. Was discharged on Lantus 35 units qhs and lispro 10u TIDAC in July 2019; also on Janumet 50-1000mg and Jardiance 25mg at home  - mISS  - monitor FSG  - dosed for Lantus 25u qhs as patient hasn't eaten, increase to home dose as per FSG  - start lispro 5u TIDAC, increase as per FSG

## 2019-10-22 NOTE — PROGRESS NOTE ADULT - ASSESSMENT
per Internal Medicine    75 yo M with a PMH of DM and HTN brought to the ED s/p a fall, admitted for further management    Problem/Plan - 1:  ·  Problem: Fall.  Plan: Patient with a hx of multiple falls with recent admission in July 2019 for a fall, discharged to [facility]. He now presents s/p a witness fall at home, with a prior unwitnessed fall. Denies chest pain, palpitations preceding the fall. EKG w/ NSR no ischemic changes. Etiology likely in the setting of unsteady gait 2/2 osteoarthritis   - CT head: no hemorrhage or infarct  - CT c-spine: no fracture  - PT recommendations: dc to [facility]  - Fall precautions  - TSH, B12 normal in July 2019.     Problem/Plan - 2:  ·  Problem: Pulmonary lesion of right side of chest.  Plan: Small focal nodular density right upper lobe posteriorly measuring 12 mm, indeterminate.  - consider repeat CT at 3 months, PET/CT or biopsy    ADDENDUM: suspicious for neoplasm on official CT, recommend bx  and/or PET  #Renal cyst  3.7 cm left renal cyst, otherwise normal kidneys  - kidney function wnl  - no acute intervention.     Problem/Plan - 3:  ·  Problem: Abrasion.  Plan: Abrasion on R chest area, with fluid filled blisters and crusting, + R knee abrasion s/p a fall  - bacitracin ointment.     Problem/Plan - 4:  ·  Problem: Type 2 diabetes mellitus.  Plan: Hx of uncontrolled DM (A1c 11 in July 2019), previously reported noncompliant with medications. Was discharged on Lantus 35 units qhs and lispro 10u TIDAC in July 2019; also on Janumet 50-1000mg and Jardiance 25mg at home  - mISS  - monitor FSG  - dosed for Lantus 25u qhs as patient hasn't eaten, increase to home dose as per FSG  - start lispro 5u TIDAC, increase as per FSG.     Problem/Plan - 5:  ·  Problem: Thrombocytopenia.  Plan: PLTs 114 on ED presentation, as per chart review pt with thrombocytopenia in July 2019, with PLTs 95 - 107, which improved to 130s-->152 on discharge. Unclear etiology, however currently w/o any s/s of bleeding  - monitor PLTs  - will consider w/up if thrombocytopenia worsens    ADDENDUM: dc lovenox if platelets decreasing.     Problem/Plan - 6:  Problem: HTN (hypertension). Plan: On lisinopril 10mg qd at home   - c/w home med.    Problem/Plan - 7:  ·  Problem: Nutrition, metabolism, and development symptoms.  Plan: F: NS @125cc/hr  E: replete PRN  N: DASH/TLC/CC.     Problem/Plan - 8:  ·  Problem: Prophylactic measure.  Plan: DVT ppx: Lovenox 40mg SC qd  GI ppx: none    Dispo: [facility]  Code: FULL.
75 yo M with a PMH of DM and HTN brought to the ED s/p a fall, admitted for further management
75 yo M with a PMH of DM and HTN brought to the ED s/p a fall, admitted for further management

## 2019-10-22 NOTE — PROGRESS NOTE ADULT - SUBJECTIVE AND OBJECTIVE BOX
INTERVAL HPI/OVERNIGHT EVENTS:    SUBJECTIVE: Patient seen and examined at bedside.    VITAL SIGNS:  ICU Vital Signs Last 24 Hrs  T(C): 36.8 (22 Oct 2019 12:13), Max: 36.8 (21 Oct 2019 21:22)  T(F): 98.3 (22 Oct 2019 12:13), Max: 98.3 (21 Oct 2019 21:22)  HR: 69 (22 Oct 2019 12:13) (54 - 69)  BP: 131/80 (22 Oct 2019 12:13) (131/80 - 166/76)  BP(mean): --  ABP: --  ABP(mean): --  RR: 18 (22 Oct 2019 12:13) (17 - 18)  SpO2: 99% (22 Oct 2019 12:13) (97% - 99%)        CAPILLARY BLOOD GLUCOSE      POCT Blood Glucose.: 328 mg/dL (22 Oct 2019 14:24)      PHYSICAL EXAM:    Constitutional: NAD  HEENT: NC/AT; PERRL, anicteric sclera; MMM  Neck: supple, no JVD  Cardiovascular: +S1/S2, RRR  Respiratory: CTA B/L, no W/R/R  Gastrointestinal: abdomen soft, NT/ND; no rebound or guarding; +BSx4  Genitourinary: no suprapubic tenderness or fullness  Extremities: WWP; no LE edema; no clubbing or cyanosis  Vascular: 2+ radial, DP/PT and femoral pulses B/L  Dermatologic: normal color and turgor; no visible rashes  Neurological:     MEDICATIONS:  MEDICATIONS  (STANDING):  BACItracin   Ointment 1 Application(s) Topical daily  dextrose 5%. 1000 milliLiter(s) (50 mL/Hr) IV Continuous <Continuous>  dextrose 50% Injectable 12.5 Gram(s) IV Push once  dextrose 50% Injectable 25 Gram(s) IV Push once  dextrose 50% Injectable 25 Gram(s) IV Push once  enoxaparin Injectable 40 milliGRAM(s) SubCutaneous every 24 hours  insulin glargine Injectable (LANTUS) 35 Unit(s) SubCutaneous at bedtime  insulin lispro (HumaLOG) corrective regimen sliding scale   SubCutaneous Before meals and at bedtime  insulin lispro Injectable (HumaLOG) 5 Unit(s) SubCutaneous three times a day before meals  lisinopril 10 milliGRAM(s) Oral daily    MEDICATIONS  (PRN):  ALBUTerol    90 MICROgram(s) HFA Inhaler 2 Puff(s) Inhalation every 6 hours PRN Shortness of Breath and/or Wheezing  dextrose 40% Gel 15 Gram(s) Oral once PRN Blood Glucose LESS THAN 70 milliGRAM(s)/deciliter  glucagon  Injectable 1 milliGRAM(s) IntraMuscular once PRN Glucose LESS THAN 70 milligrams/deciliter      ALLERGIES:  Allergies    No Known Allergies    Intolerances        LABS:                        14.7   5.03  )-----------( 131      ( 22 Oct 2019 06:53 )             41.9     10-22    139  |  103  |  15  ----------------------------<  178<H>  3.8   |  26  |  0.65    Ca    8.6      22 Oct 2019 06:53  Phos  3.2     10-21  Mg     2.1     10-        Urinalysis Basic - ( 21 Oct 2019 08:11 )    Color: Yellow / Appearance: Clear / S.015 / pH: x  Gluc: x / Ketone: NEGATIVE  / Bili: Negative / Urobili: 0.2 E.U./dL   Blood: x / Protein: NEGATIVE mg/dL / Nitrite: NEGATIVE   Leuk Esterase: NEGATIVE / RBC: x / WBC x   Sq Epi: x / Non Sq Epi: x / Bacteria: x        RADIOLOGY & ADDITIONAL TESTS: Reviewed. INTERVAL HPI/OVERNIGHT EVENTS: Pt repeatedly asking for "food" and "home." Spoke with patient at length using the  phone yesterday afternoon, with patient endorsing that his fall occurred out in the street while he was walking from the bus stop. He said pedestrians helped him home. He is convinced that his falls are due to his "weak knees, due to arthritis."    SUBJECTIVE: Patient seen and examined at bedside.    VITAL SIGNS:  ICU Vital Signs Last 24 Hrs  T(C): 36.8 (22 Oct 2019 12:13), Max: 36.8 (21 Oct 2019 21:22)  T(F): 98.3 (22 Oct 2019 12:13), Max: 98.3 (21 Oct 2019 21:22)  HR: 69 (22 Oct 2019 12:13) (54 - 69)  BP: 131/80 (22 Oct 2019 12:13) (131/80 - 166/76)  BP(mean): --  ABP: --  ABP(mean): --  RR: 18 (22 Oct 2019 12:13) (17 - 18)  SpO2: 99% (22 Oct 2019 12:13) (97% - 99%)        CAPILLARY BLOOD GLUCOSE      POCT Blood Glucose.: 328 mg/dL (22 Oct 2019 14:24)      PHYSICAL EXAM:    Constitutional: NAD, lying comfortably in bed  HEENT: NC/AT; PERRL, anicteric sclera; dry mucous membranes, bruise on R cheek  Neck: supple, no JVD, no thyromegaly  Cardiovascular: +S1/S2, RRR, no m/g/r  Respiratory: CTA B/L, no W/R/R  Gastrointestinal: abdomen soft, NT/ND; no rebound or guarding; +BSx4  Genitourinary: no suprapubic tenderness or fullness  Extremities: WWP; no LE edema; no clubbing or cyanosis  Vascular: 2+ radial, DP/PT pulses B/L  Dermatologic: abrasion on R chest area with fluid filled blisters and crusting in a non-dermatomal distribution, abrasion on lateral aspect of R knee   Neurological: alert and oriented, but neuro exam limited 2/2 language barrier, cranial nerves grossly intact, no focal deficits appreciated, gait testing deferred    MEDICATIONS:  MEDICATIONS  (STANDING):  BACItracin   Ointment 1 Application(s) Topical daily  dextrose 5%. 1000 milliLiter(s) (50 mL/Hr) IV Continuous <Continuous>  dextrose 50% Injectable 12.5 Gram(s) IV Push once  dextrose 50% Injectable 25 Gram(s) IV Push once  dextrose 50% Injectable 25 Gram(s) IV Push once  enoxaparin Injectable 40 milliGRAM(s) SubCutaneous every 24 hours  insulin glargine Injectable (LANTUS) 35 Unit(s) SubCutaneous at bedtime  insulin lispro (HumaLOG) corrective regimen sliding scale   SubCutaneous Before meals and at bedtime  insulin lispro Injectable (HumaLOG) 5 Unit(s) SubCutaneous three times a day before meals  lisinopril 10 milliGRAM(s) Oral daily    MEDICATIONS  (PRN):  ALBUTerol    90 MICROgram(s) HFA Inhaler 2 Puff(s) Inhalation every 6 hours PRN Shortness of Breath and/or Wheezing  dextrose 40% Gel 15 Gram(s) Oral once PRN Blood Glucose LESS THAN 70 milliGRAM(s)/deciliter  glucagon  Injectable 1 milliGRAM(s) IntraMuscular once PRN Glucose LESS THAN 70 milligrams/deciliter      ALLERGIES:  Allergies    No Known Allergies    Intolerances        LABS:                        14.7   5.03  )-----------( 131      ( 22 Oct 2019 06:53 )             41.9     10-22    139  |  103  |  15  ----------------------------<  178<H>  3.8   |  26  |  0.65    Ca    8.6      22 Oct 2019 06:53  Phos  3.2     10-21  Mg     2.1     10-22        Urinalysis Basic - ( 21 Oct 2019 08:11 )    Color: Yellow / Appearance: Clear / S.015 / pH: x  Gluc: x / Ketone: NEGATIVE  / Bili: Negative / Urobili: 0.2 E.U./dL   Blood: x / Protein: NEGATIVE mg/dL / Nitrite: NEGATIVE   Leuk Esterase: NEGATIVE / RBC: x / WBC x   Sq Epi: x / Non Sq Epi: x / Bacteria: x        RADIOLOGY & ADDITIONAL TESTS: Reviewed.

## 2019-10-22 NOTE — PROGRESS NOTE ADULT - PROBLEM SELECTOR PLAN 8
DVT ppx: Lovenox 40mg SC qd  GI ppx: none    Dispo: [facility]  Code: FULL
DVT ppx: Lovenox 40mg SC qd  GI ppx: none    Dispo: [facility]  Code: FULL

## 2019-10-23 ENCOUNTER — TRANSCRIPTION ENCOUNTER (OUTPATIENT)
Age: 74
End: 2019-10-23

## 2019-10-23 VITALS
SYSTOLIC BLOOD PRESSURE: 120 MMHG | HEART RATE: 67 BPM | OXYGEN SATURATION: 99 % | DIASTOLIC BLOOD PRESSURE: 75 MMHG | TEMPERATURE: 98 F | RESPIRATION RATE: 18 BRPM

## 2019-10-23 LAB
GLUCOSE BLDC GLUCOMTR-MCNC: 129 MG/DL — HIGH (ref 70–99)
GLUCOSE BLDC GLUCOMTR-MCNC: 155 MG/DL — HIGH (ref 70–99)

## 2019-10-23 PROCEDURE — 93005 ELECTROCARDIOGRAM TRACING: CPT

## 2019-10-23 PROCEDURE — 74177 CT ABD & PELVIS W/CONTRAST: CPT

## 2019-10-23 PROCEDURE — 82962 GLUCOSE BLOOD TEST: CPT

## 2019-10-23 PROCEDURE — 99285 EMERGENCY DEPT VISIT HI MDM: CPT | Mod: 25

## 2019-10-23 PROCEDURE — 97161 PT EVAL LOW COMPLEX 20 MIN: CPT

## 2019-10-23 PROCEDURE — 84100 ASSAY OF PHOSPHORUS: CPT

## 2019-10-23 PROCEDURE — 72125 CT NECK SPINE W/O DYE: CPT

## 2019-10-23 PROCEDURE — 73590 X-RAY EXAM OF LOWER LEG: CPT

## 2019-10-23 PROCEDURE — 80053 COMPREHEN METABOLIC PANEL: CPT

## 2019-10-23 PROCEDURE — 90471 IMMUNIZATION ADMIN: CPT

## 2019-10-23 PROCEDURE — 70450 CT HEAD/BRAIN W/O DYE: CPT

## 2019-10-23 PROCEDURE — 99238 HOSP IP/OBS DSCHRG MGMT 30/<: CPT

## 2019-10-23 PROCEDURE — 83036 HEMOGLOBIN GLYCOSYLATED A1C: CPT

## 2019-10-23 PROCEDURE — 87086 URINE CULTURE/COLONY COUNT: CPT

## 2019-10-23 PROCEDURE — 85027 COMPLETE CBC AUTOMATED: CPT

## 2019-10-23 PROCEDURE — 80048 BASIC METABOLIC PNL TOTAL CA: CPT

## 2019-10-23 PROCEDURE — 83735 ASSAY OF MAGNESIUM: CPT

## 2019-10-23 PROCEDURE — 36415 COLL VENOUS BLD VENIPUNCTURE: CPT

## 2019-10-23 PROCEDURE — 71260 CT THORAX DX C+: CPT

## 2019-10-23 PROCEDURE — 71045 X-RAY EXAM CHEST 1 VIEW: CPT

## 2019-10-23 PROCEDURE — 81003 URINALYSIS AUTO W/O SCOPE: CPT

## 2019-10-23 PROCEDURE — 90715 TDAP VACCINE 7 YRS/> IM: CPT

## 2019-10-23 RX ORDER — FLUTICASONE PROPIONATE AND SALMETEROL 50; 250 UG/1; UG/1
0 POWDER ORAL; RESPIRATORY (INHALATION)
Qty: 60 | Refills: 0 | DISCHARGE

## 2019-10-23 RX ORDER — FLUTICASONE FUROATE AND VILANTEROL TRIFENATATE 100; 25 UG/1; UG/1
0 POWDER RESPIRATORY (INHALATION)
Qty: 60 | Refills: 0 | DISCHARGE

## 2019-10-23 RX ORDER — BACITRACIN ZINC 500 UNIT/G
1 OINTMENT IN PACKET (EA) TOPICAL
Qty: 0 | Refills: 0 | DISCHARGE
Start: 2019-10-23

## 2019-10-23 RX ADMIN — Medication 5 UNIT(S): at 12:44

## 2019-10-23 RX ADMIN — Medication 5 UNIT(S): at 08:43

## 2019-10-23 RX ADMIN — Medication 2: at 12:44

## 2019-10-23 RX ADMIN — Medication 1 APPLICATION(S): at 12:43

## 2019-10-23 RX ADMIN — ENOXAPARIN SODIUM 40 MILLIGRAM(S): 100 INJECTION SUBCUTANEOUS at 05:36

## 2019-10-23 RX ADMIN — LISINOPRIL 10 MILLIGRAM(S): 2.5 TABLET ORAL at 05:36

## 2019-10-23 NOTE — DISCHARGE NOTE PROVIDER - HOSPITAL COURSE
Patient is 75 yo M with past medical history of OA, DM and HTN.    He was brought to the ED s/p witnessed mechanical fall, without any associated LOC, chest pain, palpitations, SOB, abdominal pain, nausea/vomiting, diarrhea/constipation. . He has a history of multiple falls in the past, endorsing feeling unsteady due to "bad knees."        1.) Witnessed mechanical fall    -CTH showed no acute pathology, infarcts or hemorrhages.    -Xrays ruled out fractures    -H        New medications: No changes to his medications were made, and no new medications were started    Labs to be followed outpatient:     Exam to be followed outpatient:                                     Patient is a 75 yo M with a PMH of DM and HTN brought to the ED s/p a fall on 10/19. Patient accompanied by family who witnessed a mechanical fall at home, but prior to that he was ??found on the floor under the bed as per ED note. He reports he tripped and fell on his right side and hit his knee, R cheek and chest, he was on the floor and his wife lifted him up. Patient with recent admission at [facility] in July 2019 for a fall, was discharged to [facility]. He reports that he feels unsteady and has had multiple falls due to his knees giving out. He has OA, worse in his R knee and reports his knee yee and gives out. Denies any LOC. Reports R knee pain. Denies fever, chills, chest pain, palpitations, SOB, abdominal pain, nausea/vomiting, diarrhea/constipation. Patient is 75 yo M with past medical history of OA, DM and HTN.    He was brought to the ED s/p witnessed mechanical fall, without any associated LOC, chest pain, palpitations, SOB, abdominal pain, nausea/vomiting, diarrhea/constipation. . He has a history of multiple falls in the past, endorsing feeling unsteady due to "bad knees."        1.) Witnessed mechanical fall    -CTH showed no acute pathology, infarcts or hemorrhages.    -Xrays ruled out fractures    -CT C/A/P showed a R upper lobe nodule, but when pt was informed via , he acknowledged that he is aware of it, and had it biopsied in August 2019        New medications: No changes to his medications were made, and no new medications were started.    Labs to be followed outpatient:     Exam to be followed outpatient:                                     Patient is a 75 yo M with a PMH of DM and HTN brought to the ED s/p a fall on 10/19. Patient accompanied by family who witnessed a mechanical fall at home, but prior to that he was ??found on the floor under the bed as per ED note. He reports he tripped and fell on his right side and hit his knee, R cheek and chest, he was on the floor and his wife lifted him up. Patient with recent admission at [facility] in July 2019 for a fall, was discharged to [facility]. He reports that he feels unsteady and has had multiple falls due to his knees giving out. He has OA, worse in his R knee and reports his knee yee and gives out. Denies any LOC. Reports R knee pain. Denies fever, chills, chest pain, palpitations, SOB, abdominal pain, nausea/vomiting, diarrhea/constipation. Patient is 73 yo M with past medical history of OA, DM and HTN.    He was brought to the ED s/p witnessed mechanical fall, without any associated LOC, chest pain, palpitations, SOB, abdominal pain, nausea/vomiting, diarrhea/constipation. . He has a history of multiple falls in the past, endorsing feeling unsteady due to "bad knees."        1.) Witnessed mechanical fall    -CTH showed no acute pathology, infarcts or hemorrhages.    -Xrays ruled out fractures    -CT C/A/P showed a R upper lobe nodule, but when pt was informed via , he acknowledged that he is aware of it, and had it biopsied in August 2019 at [facility].        New medications: No changes to his medications were made, and no new medications were started.

## 2019-10-23 NOTE — DISCHARGE NOTE PROVIDER - PROVIDER TOKENS
FREE:[LAST:[Grover],FIRST:[Jeanie],PHONE:[phone]:[(   [phone]    -],ADDRESS:[[address]]]

## 2019-10-23 NOTE — DISCHARGE NOTE NURSING/CASE MANAGEMENT/SOCIAL WORK - PATIENT PORTAL LINK FT
You can access the FollowMyHealth Patient Portal offered by [facility] by registering at the following website: [email or web address]. By joining [employer]’s FollowMyHealth portal, you will also be able to view your health information using other applications (apps) compatible with our system.

## 2019-10-23 NOTE — DISCHARGE NOTE PROVIDER - NSDCCPCAREPLAN_GEN_ALL_CORE_FT
PRINCIPAL DISCHARGE DIAGNOSIS  Diagnosis: Fall  Assessment and Plan of Treatment: You were brought into the hospital because you had a witnessed mechanical fall. You had some abrasions in your right side and right leg, but no fractures. You have no intracranial pathology that could have caused the fall. Your vital signs were stable throughout your hospital stay, and we are discharging you to rehab.

## 2020-07-02 NOTE — ED ADULT TRIAGE NOTE - NS ED TRIAGE HISTORIAN
2020    TELEHEALTH EVALUATION -- Audio/Visual (During KFXJS-14 public health emergency)    HPI:    Delmy Morelos (:  [address] [place] [address], [address] [place] [address] authority and the [employer] Patient

## 2020-08-07 NOTE — ED ADULT NURSE NOTE - TELEPHONIC ID NUMBER OF THE INTERPRETER
[id number]
Note Text (......Xxx Chief Complaint.): This diagnosis correlates with the
Detail Level: Zone
Other (Free Text): Discussed possibility of starting Humira if no improvement with increased dose of methotrexate sodium 2.5 mg tablet\\nContinue folic acid

## 2021-06-21 NOTE — PHYSICAL THERAPY INITIAL EVALUATION ADULT - SOCIAL CONCERNS
None Taltz Counseling: I discussed with the patient the risks of ixekizumab including but not limited to immunosuppression, serious infections, worsening of inflammatory bowel disease and drug reactions.  The patient understands that monitoring is required including a PPD at baseline and must alert us or the primary physician if symptoms of infection or other concerning signs are noted.

## 2021-08-14 VITALS
HEIGHT: 62.99 IN | SYSTOLIC BLOOD PRESSURE: 133 MMHG | DIASTOLIC BLOOD PRESSURE: 62 MMHG | HEART RATE: 63 BPM | OXYGEN SATURATION: 98 % | RESPIRATION RATE: 18 BRPM | TEMPERATURE: 98 F | WEIGHT: 139.99 LBS

## 2021-08-14 LAB
ALBUMIN SERPL ELPH-MCNC: 4.1 G/DL — SIGNIFICANT CHANGE UP (ref 3.3–5)
ALP SERPL-CCNC: 72 U/L — SIGNIFICANT CHANGE UP (ref 40–120)
ALT FLD-CCNC: 15 U/L — SIGNIFICANT CHANGE UP (ref 10–45)
ANION GAP SERPL CALC-SCNC: 11 MMOL/L — SIGNIFICANT CHANGE UP (ref 5–17)
APPEARANCE UR: CLEAR — SIGNIFICANT CHANGE UP
AST SERPL-CCNC: 20 U/L — SIGNIFICANT CHANGE UP (ref 10–40)
BACTERIA # UR AUTO: PRESENT /HPF
BASE EXCESS BLDV CALC-SCNC: -2 MMOL/L — SIGNIFICANT CHANGE UP (ref -2–3)
BASOPHILS # BLD AUTO: 0.03 K/UL — SIGNIFICANT CHANGE UP (ref 0–0.2)
BASOPHILS NFR BLD AUTO: 0.5 % — SIGNIFICANT CHANGE UP (ref 0–2)
BILIRUB SERPL-MCNC: 0.4 MG/DL — SIGNIFICANT CHANGE UP (ref 0.2–1.2)
BILIRUB UR-MCNC: NEGATIVE — SIGNIFICANT CHANGE UP
BUN SERPL-MCNC: 24 MG/DL — HIGH (ref 7–23)
CA-I SERPL-SCNC: 1.11 MMOL/L — LOW (ref 1.15–1.33)
CALCIUM SERPL-MCNC: 9.2 MG/DL — SIGNIFICANT CHANGE UP (ref 8.4–10.5)
CHLORIDE SERPL-SCNC: 111 MMOL/L — HIGH (ref 96–108)
CO2 BLDV-SCNC: 23.4 MMOL/L — SIGNIFICANT CHANGE UP (ref 22–26)
CO2 SERPL-SCNC: 23 MMOL/L — SIGNIFICANT CHANGE UP (ref 22–31)
COLOR SPEC: YELLOW — SIGNIFICANT CHANGE UP
CREAT SERPL-MCNC: 0.94 MG/DL — SIGNIFICANT CHANGE UP (ref 0.5–1.3)
DIFF PNL FLD: ABNORMAL
EOSINOPHIL # BLD AUTO: 0.12 K/UL — SIGNIFICANT CHANGE UP (ref 0–0.5)
EOSINOPHIL NFR BLD AUTO: 2 % — SIGNIFICANT CHANGE UP (ref 0–6)
EPI CELLS # UR: SIGNIFICANT CHANGE UP /HPF (ref 0–5)
GAS PNL BLDV: 139 MMOL/L — SIGNIFICANT CHANGE UP (ref 136–145)
GAS PNL BLDV: SIGNIFICANT CHANGE UP
GLUCOSE SERPL-MCNC: 51 MG/DL — CRITICAL LOW (ref 70–99)
GLUCOSE UR QL: >=1000
HCO3 BLDV-SCNC: 22 MMOL/L — SIGNIFICANT CHANGE UP (ref 22–29)
HCT VFR BLD CALC: 43.1 % — SIGNIFICANT CHANGE UP (ref 39–50)
HGB BLD-MCNC: 14.3 G/DL — SIGNIFICANT CHANGE UP (ref 13–17)
IMM GRANULOCYTES NFR BLD AUTO: 0.3 % — SIGNIFICANT CHANGE UP (ref 0–1.5)
KETONES UR-MCNC: NEGATIVE — SIGNIFICANT CHANGE UP
LACTATE SERPL-SCNC: 1.1 MMOL/L — SIGNIFICANT CHANGE UP (ref 0.5–2)
LEUKOCYTE ESTERASE UR-ACNC: NEGATIVE — SIGNIFICANT CHANGE UP
LYMPHOCYTES # BLD AUTO: 1.34 K/UL — SIGNIFICANT CHANGE UP (ref 1–3.3)
LYMPHOCYTES # BLD AUTO: 22.4 % — SIGNIFICANT CHANGE UP (ref 13–44)
MCHC RBC-ENTMCNC: 30.4 PG — SIGNIFICANT CHANGE UP (ref 27–34)
MCHC RBC-ENTMCNC: 33.2 GM/DL — SIGNIFICANT CHANGE UP (ref 32–36)
MCV RBC AUTO: 91.5 FL — SIGNIFICANT CHANGE UP (ref 80–100)
MONOCYTES # BLD AUTO: 0.69 K/UL — SIGNIFICANT CHANGE UP (ref 0–0.9)
MONOCYTES NFR BLD AUTO: 11.6 % — SIGNIFICANT CHANGE UP (ref 2–14)
NEUTROPHILS # BLD AUTO: 3.77 K/UL — SIGNIFICANT CHANGE UP (ref 1.8–7.4)
NEUTROPHILS NFR BLD AUTO: 63.2 % — SIGNIFICANT CHANGE UP (ref 43–77)
NITRITE UR-MCNC: NEGATIVE — SIGNIFICANT CHANGE UP
NRBC # BLD: 0 /100 WBCS — SIGNIFICANT CHANGE UP (ref 0–0)
PCO2 BLDV: 36 MMHG — LOW (ref 42–55)
PH BLDV: 7.4 — SIGNIFICANT CHANGE UP (ref 7.32–7.43)
PH UR: 6 — SIGNIFICANT CHANGE UP (ref 5–8)
PLATELET # BLD AUTO: 107 K/UL — LOW (ref 150–400)
PO2 BLDV: 94 MMHG — SIGNIFICANT CHANGE UP
POTASSIUM BLDV-SCNC: 4.9 MMOL/L — SIGNIFICANT CHANGE UP (ref 3.5–5.1)
POTASSIUM SERPL-MCNC: 4 MMOL/L — SIGNIFICANT CHANGE UP (ref 3.5–5.3)
POTASSIUM SERPL-SCNC: 4 MMOL/L — SIGNIFICANT CHANGE UP (ref 3.5–5.3)
PROT SERPL-MCNC: 6.9 G/DL — SIGNIFICANT CHANGE UP (ref 6–8.3)
PROT UR-MCNC: NEGATIVE MG/DL — SIGNIFICANT CHANGE UP
RBC # BLD: 4.71 M/UL — SIGNIFICANT CHANGE UP (ref 4.2–5.8)
RBC # FLD: 12.1 % — SIGNIFICANT CHANGE UP (ref 10.3–14.5)
RBC CASTS # UR COMP ASSIST: < 5 /HPF — SIGNIFICANT CHANGE UP
SAO2 % BLDV: 97.5 % — SIGNIFICANT CHANGE UP
SODIUM SERPL-SCNC: 145 MMOL/L — SIGNIFICANT CHANGE UP (ref 135–145)
SP GR SPEC: 1.02 — SIGNIFICANT CHANGE UP (ref 1–1.03)
UROBILINOGEN FLD QL: 0.2 E.U./DL — SIGNIFICANT CHANGE UP
WBC # BLD: 5.97 K/UL — SIGNIFICANT CHANGE UP (ref 3.8–10.5)
WBC # FLD AUTO: 5.97 K/UL — SIGNIFICANT CHANGE UP (ref 3.8–10.5)
WBC UR QL: < 5 /HPF — SIGNIFICANT CHANGE UP

## 2021-08-14 PROCEDURE — 71045 X-RAY EXAM CHEST 1 VIEW: CPT | Mod: 26

## 2021-08-14 PROCEDURE — 99285 EMERGENCY DEPT VISIT HI MDM: CPT

## 2021-08-14 RX ORDER — DEXTROSE 50 % IN WATER 50 %
50 SYRINGE (ML) INTRAVENOUS ONCE
Refills: 0 | Status: COMPLETED | OUTPATIENT
Start: 2021-08-14 | End: 2021-08-14

## 2021-08-14 RX ADMIN — Medication 50 MILLILITER(S): at 19:10

## 2021-08-14 NOTE — ED ADULT NURSE NOTE - OBJECTIVE STATEMENT
pt presents to ED complaining of "tiredness" and dizziness.  Pt has history of diabetes and says he ate around 130pm and then gave himself 35 units insulin around 2pm.  PT reports getting really tired later today and then he had to put himself on the floor because he was too weak.  PT denies actually falling.  Denies any head injury, denies pain.  Pt reports dizziness and says he has been dizzy for the last week.  denies recent illness.

## 2021-08-14 NOTE — ED ADULT TRIAGE NOTE - OTHER COMPLAINTS
patient BIBEMS s/p fall after feeling dizzy-- patient reports dizziness x 1 week, denies head strike/CP/SOB-- reports "feeling very tired"-- patient reports hx of DM, BS in field 63 by EMS-- patient answering questions appropriately

## 2021-08-15 ENCOUNTER — TRANSCRIPTION ENCOUNTER (OUTPATIENT)
Age: 76
End: 2021-08-15

## 2021-08-15 ENCOUNTER — INPATIENT (INPATIENT)
Facility: HOSPITAL | Age: 76
LOS: 0 days | Discharge: HOME | DRG: 639 | End: 2021-08-15
Attending: STUDENT IN AN ORGANIZED HEALTH CARE EDUCATION/TRAINING PROGRAM | Admitting: STUDENT IN AN ORGANIZED HEALTH CARE EDUCATION/TRAINING PROGRAM
Payer: MEDICARE

## 2021-08-15 VITALS
SYSTOLIC BLOOD PRESSURE: 155 MMHG | DIASTOLIC BLOOD PRESSURE: 81 MMHG | RESPIRATION RATE: 18 BRPM | OXYGEN SATURATION: 98 % | HEART RATE: 51 BPM | TEMPERATURE: 98 F

## 2021-08-15 DIAGNOSIS — I10 ESSENTIAL (PRIMARY) HYPERTENSION: ICD-10-CM

## 2021-08-15 DIAGNOSIS — R63.8 OTHER SYMPTOMS AND SIGNS CONCERNING FOOD AND FLUID INTAKE: ICD-10-CM

## 2021-08-15 DIAGNOSIS — E11.9 TYPE 2 DIABETES MELLITUS WITHOUT COMPLICATIONS: ICD-10-CM

## 2021-08-15 DIAGNOSIS — E11.649 TYPE 2 DIABETES MELLITUS WITH HYPOGLYCEMIA WITHOUT COMA: ICD-10-CM

## 2021-08-15 DIAGNOSIS — D69.6 THROMBOCYTOPENIA, UNSPECIFIED: ICD-10-CM

## 2021-08-15 DIAGNOSIS — R94.31 ABNORMAL ELECTROCARDIOGRAM [ECG] [EKG]: ICD-10-CM

## 2021-08-15 DIAGNOSIS — J98.4 OTHER DISORDERS OF LUNG: ICD-10-CM

## 2021-08-15 DIAGNOSIS — E16.2 HYPOGLYCEMIA, UNSPECIFIED: ICD-10-CM

## 2021-08-15 DIAGNOSIS — N40.0 BENIGN PROSTATIC HYPERPLASIA WITHOUT LOWER URINARY TRACT SYMPTOMS: ICD-10-CM

## 2021-08-15 LAB
A1C WITH ESTIMATED AVERAGE GLUCOSE RESULT: 6.7 % — HIGH (ref 4–5.6)
A1C WITH ESTIMATED AVERAGE GLUCOSE RESULT: 6.9 % — HIGH (ref 4–5.6)
ALBUMIN SERPL ELPH-MCNC: 4.1 G/DL — SIGNIFICANT CHANGE UP (ref 3.3–5)
ALP SERPL-CCNC: 55 U/L — SIGNIFICANT CHANGE UP (ref 40–120)
ALT FLD-CCNC: 14 U/L — SIGNIFICANT CHANGE UP (ref 10–45)
ANION GAP SERPL CALC-SCNC: 9 MMOL/L — SIGNIFICANT CHANGE UP (ref 5–17)
AST SERPL-CCNC: 15 U/L — SIGNIFICANT CHANGE UP (ref 10–40)
BILIRUB SERPL-MCNC: 1 MG/DL — SIGNIFICANT CHANGE UP (ref 0.2–1.2)
BUN SERPL-MCNC: 17 MG/DL — SIGNIFICANT CHANGE UP (ref 7–23)
C PEPTIDE SERPL-MCNC: 1.6 NG/ML — SIGNIFICANT CHANGE UP (ref 1.1–4.4)
CALCIUM SERPL-MCNC: 8.7 MG/DL — SIGNIFICANT CHANGE UP (ref 8.4–10.5)
CHLORIDE SERPL-SCNC: 109 MMOL/L — HIGH (ref 96–108)
CO2 SERPL-SCNC: 24 MMOL/L — SIGNIFICANT CHANGE UP (ref 22–31)
COVID-19 SPIKE DOMAIN AB INTERP: POSITIVE
COVID-19 SPIKE DOMAIN ANTIBODY RESULT: >250 U/ML — HIGH
CREAT SERPL-MCNC: 0.81 MG/DL — SIGNIFICANT CHANGE UP (ref 0.5–1.3)
ESTIMATED AVERAGE GLUCOSE: 146 MG/DL — HIGH (ref 68–114)
ESTIMATED AVERAGE GLUCOSE: 151 MG/DL — HIGH (ref 68–114)
GLUCOSE BLDC GLUCOMTR-MCNC: 131 MG/DL — HIGH (ref 70–99)
GLUCOSE BLDC GLUCOMTR-MCNC: 138 MG/DL — HIGH (ref 70–99)
GLUCOSE BLDC GLUCOMTR-MCNC: 168 MG/DL — HIGH (ref 70–99)
GLUCOSE BLDC GLUCOMTR-MCNC: 176 MG/DL — HIGH (ref 70–99)
GLUCOSE BLDC GLUCOMTR-MCNC: 177 MG/DL — HIGH (ref 70–99)
GLUCOSE BLDC GLUCOMTR-MCNC: 182 MG/DL — HIGH (ref 70–99)
GLUCOSE SERPL-MCNC: 157 MG/DL — HIGH (ref 70–99)
HCT VFR BLD CALC: 44.8 % — SIGNIFICANT CHANGE UP (ref 39–50)
HCV AB S/CO SERPL IA: 0.04 S/CO — SIGNIFICANT CHANGE UP
HCV AB SERPL-IMP: SIGNIFICANT CHANGE UP
HGB BLD-MCNC: 14.5 G/DL — SIGNIFICANT CHANGE UP (ref 13–17)
MAGNESIUM SERPL-MCNC: 2.2 MG/DL — SIGNIFICANT CHANGE UP (ref 1.6–2.6)
MCHC RBC-ENTMCNC: 29.9 PG — SIGNIFICANT CHANGE UP (ref 27–34)
MCHC RBC-ENTMCNC: 32.4 GM/DL — SIGNIFICANT CHANGE UP (ref 32–36)
MCV RBC AUTO: 92.4 FL — SIGNIFICANT CHANGE UP (ref 80–100)
NRBC # BLD: 0 /100 WBCS — SIGNIFICANT CHANGE UP (ref 0–0)
PHOSPHATE SERPL-MCNC: 3.6 MG/DL — SIGNIFICANT CHANGE UP (ref 2.5–4.5)
PLATELET # BLD AUTO: 104 K/UL — LOW (ref 150–400)
POTASSIUM SERPL-MCNC: 4 MMOL/L — SIGNIFICANT CHANGE UP (ref 3.5–5.3)
POTASSIUM SERPL-SCNC: 4 MMOL/L — SIGNIFICANT CHANGE UP (ref 3.5–5.3)
PROT SERPL-MCNC: 6.7 G/DL — SIGNIFICANT CHANGE UP (ref 6–8.3)
RBC # BLD: 4.85 M/UL — SIGNIFICANT CHANGE UP (ref 4.2–5.8)
RBC # FLD: 11.9 % — SIGNIFICANT CHANGE UP (ref 10.3–14.5)
SARS-COV-2 IGG+IGM SERPL QL IA: >250 U/ML — HIGH
SARS-COV-2 IGG+IGM SERPL QL IA: POSITIVE
SARS-COV-2 RNA SPEC QL NAA+PROBE: SIGNIFICANT CHANGE UP
SODIUM SERPL-SCNC: 142 MMOL/L — SIGNIFICANT CHANGE UP (ref 135–145)
WBC # BLD: 5.27 K/UL — SIGNIFICANT CHANGE UP (ref 3.8–10.5)
WBC # FLD AUTO: 5.27 K/UL — SIGNIFICANT CHANGE UP (ref 3.8–10.5)

## 2021-08-15 PROCEDURE — 99223 1ST HOSP IP/OBS HIGH 75: CPT

## 2021-08-15 PROCEDURE — 99223 1ST HOSP IP/OBS HIGH 75: CPT | Mod: GC

## 2021-08-15 RX ORDER — ATORVASTATIN CALCIUM 80 MG/1
40 TABLET, FILM COATED ORAL AT BEDTIME
Refills: 0 | Status: DISCONTINUED | OUTPATIENT
Start: 2021-08-15 | End: 2021-08-15

## 2021-08-15 RX ORDER — ACETAMINOPHEN 500 MG
650 TABLET ORAL EVERY 6 HOURS
Refills: 0 | Status: DISCONTINUED | OUTPATIENT
Start: 2021-08-15 | End: 2021-08-15

## 2021-08-15 RX ORDER — DEXTROSE 50 % IN WATER 50 %
25 SYRINGE (ML) INTRAVENOUS ONCE
Refills: 0 | Status: COMPLETED | OUTPATIENT
Start: 2021-08-15 | End: 2021-08-15

## 2021-08-15 RX ORDER — SODIUM CHLORIDE 9 MG/ML
1000 INJECTION, SOLUTION INTRAVENOUS
Refills: 0 | Status: DISCONTINUED | OUTPATIENT
Start: 2021-08-15 | End: 2021-08-15

## 2021-08-15 RX ORDER — GLUCAGON INJECTION, SOLUTION 0.5 MG/.1ML
1 INJECTION, SOLUTION SUBCUTANEOUS ONCE
Refills: 0 | Status: DISCONTINUED | OUTPATIENT
Start: 2021-08-15 | End: 2021-08-15

## 2021-08-15 RX ORDER — INSULIN LISPRO 100/ML
VIAL (ML) SUBCUTANEOUS
Refills: 0 | Status: DISCONTINUED | OUTPATIENT
Start: 2021-08-15 | End: 2021-08-15

## 2021-08-15 RX ORDER — LANOLIN ALCOHOL/MO/W.PET/CERES
3 CREAM (GRAM) TOPICAL AT BEDTIME
Refills: 0 | Status: DISCONTINUED | OUTPATIENT
Start: 2021-08-15 | End: 2021-08-15

## 2021-08-15 RX ORDER — ENOXAPARIN SODIUM 100 MG/ML
40 INJECTION SUBCUTANEOUS EVERY 24 HOURS
Refills: 0 | Status: DISCONTINUED | OUTPATIENT
Start: 2021-08-15 | End: 2021-08-15

## 2021-08-15 RX ORDER — DEXTROSE 50 % IN WATER 50 %
12.5 SYRINGE (ML) INTRAVENOUS ONCE
Refills: 0 | Status: DISCONTINUED | OUTPATIENT
Start: 2021-08-15 | End: 2021-08-15

## 2021-08-15 RX ORDER — DEXTROSE 50 % IN WATER 50 %
15 SYRINGE (ML) INTRAVENOUS ONCE
Refills: 0 | Status: DISCONTINUED | OUTPATIENT
Start: 2021-08-15 | End: 2021-08-15

## 2021-08-15 RX ORDER — DEXTROSE 50 % IN WATER 50 %
25 SYRINGE (ML) INTRAVENOUS ONCE
Refills: 0 | Status: DISCONTINUED | OUTPATIENT
Start: 2021-08-15 | End: 2021-08-15

## 2021-08-15 RX ORDER — FINASTERIDE 5 MG/1
5 TABLET, FILM COATED ORAL DAILY
Refills: 0 | Status: DISCONTINUED | OUTPATIENT
Start: 2021-08-15 | End: 2021-08-15

## 2021-08-15 RX ORDER — LISINOPRIL 2.5 MG/1
10 TABLET ORAL DAILY
Refills: 0 | Status: DISCONTINUED | OUTPATIENT
Start: 2021-08-15 | End: 2021-08-15

## 2021-08-15 RX ADMIN — SODIUM CHLORIDE 50 MILLILITER(S): 9 INJECTION, SOLUTION INTRAVENOUS at 00:13

## 2021-08-15 RX ADMIN — LISINOPRIL 10 MILLIGRAM(S): 2.5 TABLET ORAL at 06:44

## 2021-08-15 RX ADMIN — Medication 25 MILLILITER(S): at 00:13

## 2021-08-15 RX ADMIN — ENOXAPARIN SODIUM 40 MILLIGRAM(S): 100 INJECTION SUBCUTANEOUS at 06:44

## 2021-08-15 RX ADMIN — FINASTERIDE 5 MILLIGRAM(S): 5 TABLET, FILM COATED ORAL at 11:27

## 2021-08-15 RX ADMIN — Medication 1: at 17:55

## 2021-08-15 RX ADMIN — Medication 1: at 12:22

## 2021-08-15 NOTE — H&P ADULT - NSHPPHYSICALEXAM_GEN_ALL_CORE
VITAL SIGNS:  T(C): 36.4 (08-14-21 @ 23:16), Max: 36.5 (08-14-21 @ 19:37)  T(F): 97.5 (08-14-21 @ 23:16), Max: 97.7 (08-14-21 @ 19:37)  HR: 61 (08-15-21 @ 01:02) (59 - 63)  BP: 158/92 (08-15-21 @ 01:02) (133/62 - 158/92)  BP(mean): --  RR: 16 (08-15-21 @ 01:02) (16 - 18)  SpO2: 98% (08-15-21 @ 01:02) (98% - 98%)  Wt(kg): --    PHYSICAL EXAM:    Constitutional: resting comfortably in bed; NAD  Head: poor dentition  Eyes: anicteric sclera  ENT: no nasal discharge; MMM  Neck: supple;  Respiratory: CTA B/L; no W/R/R, no retractions  Cardiac: +S1/S2; RRR; no M/R/G;   Gastrointestinal: soft, NT/ND; no rebound or guarding;  Extremities: WWP, no clubbing or cyanosis; no peripheral edema  Neurologic: AAOx3  Psychiatric: affect and characteristics of appearance, verbalizations, behaviors are appropriate

## 2021-08-15 NOTE — H&P ADULT - PROBLEM SELECTOR PLAN 1
Patient has pmhx of DM, presented with complaint of dizziness, found to have symptomatic anemia, likely secondary to inappropriate diabetes medication. Patient reports being on Janumet (metformin/sitapgliptin combo pill), Januvia (sitagliptin alone), and ?insulin 35 units BID  -s/p d50 50cc x1 and d50 25cc x1  -hold all home medications  -mISS  -q4h finger sticks  -diabetes educator  -f/u with outpatient provider Patient has pmhx of DM, presented with complaint of dizziness, found to have symptomatic hypoglycemia likely secondary to polypharmacy. Patient currently on jardiance 25mg, janumet 50/1000, and humilin 35mg BID  -s/p d50 50cc x1 and d50 25cc x1  -hold all home medications, will likely not discharge on sitagliptin due to concern for hypoglycemia  -continue home atorvastatin 40mg  -mISS  -q4h finger sticks  -diabetes educator  -f/u with outpatient provider, currently follows with PCP Mane Keller Patient has pmhx of DM, presented with complaint of dizziness, found to have symptomatic hypoglycemia. Unclear cause as patient denies any recent illness, changes in appetite/eating habits, recent GI illness, or recent infection. Possibly due to polypharmacy, however unusual as patient does not report any recent changes to medications. Patient currently on jardiance 25mg, janumet 50/1000, and humilin 35mg BID  -s/p d50 50cc x1 and d50 25cc x1  -currently on d5 drip at 50cc/hr, titrate patient off drip as tolerated  -ordered for AM c-peptide, proinsulin, and sulfonylurea level, f/u results  -endocrine consult in AM for medication optimization at discharge  -hold all home medications, will likely not discharge on sitagliptin due to concern for hypoglycemia  -continue home atorvastatin 40mg  -WICHO  -q4h finger sticks  -diabetes educator  -f/u with outpatient provider, currently follows with PCP Mane Keller Patient has pmhx of DM, presented with complaint of dizziness, found to have symptomatic hypoglycemia. Unclear cause as patient denies any recent illness, changes in appetite/eating habits, recent GI illness, or recent infection. Possibly due to polypharmacy, however unusual as patient does not report any recent changes to medications. Patient currently on jardiance 25mg, janumet 50/1000, and humilin 35mg BID  -s/p d50 50cc x1 and d50 25cc x1  -currently on d5 drip at 50cc/hr, titrate patient off drip as tolerated  -ordered for AM c-peptide, proinsulin, and sulfonylurea level, f/u results  -endocrine consult in AM for medication optimization at discharge  -Obtain collateral from PCP Mane Keller  -hold all home medications, will likely not discharge on sitagliptin due to concern for hypoglycemia  -continue home atorvastatin 40mg  -WICHO  -q4h finger sticks  -diabetes educator

## 2021-08-15 NOTE — H&P ADULT - PROBLEM SELECTOR PLAN 6
Diet: consistent carb  E: replete as indicated  DVT: lovenox 40mg, monitor platelet counts, d/c if downtrending  Dispo: [facility]  Code: full code

## 2021-08-15 NOTE — H&P ADULT - NSHPLABSRESULTS_GEN_ALL_CORE
.  LABS:                         14.3   5.97  )-----------( 107      ( 14 Aug 2021 20:01 )             43.1         145  |  111<H>  |  24<H>  ----------------------------<  51<LL>  4.0   |  23  |  0.94    Ca    9.2      14 Aug 2021 20:01    TPro  6.9  /  Alb  4.1  /  TBili  0.4  /  DBili  x   /  AST  20  /  ALT  15  /  AlkPhos  72        Urinalysis Basic - ( 14 Aug 2021 21:52 )    Color: Yellow / Appearance: Clear / S.025 / pH: x  Gluc: x / Ketone: NEGATIVE  / Bili: Negative / Urobili: 0.2 E.U./dL   Blood: x / Protein: NEGATIVE mg/dL / Nitrite: NEGATIVE   Leuk Esterase: NEGATIVE / RBC: < 5 /HPF / WBC < 5 /HPF   Sq Epi: x / Non Sq Epi: 0-5 /HPF / Bacteria: Present /HPF            Lactate, Blood: 1.1 mmol/L ( @ 20:01)      RADIOLOGY, EKG & ADDITIONAL TESTS: Reviewed. LABS:                         14.3   5.97  )-----------( 107      ( 14 Aug 2021 20:01 )             43.1         145  |  111<H>  |  24<H>  ----------------------------<  51<LL>  4.0   |  23  |  0.94    Ca    9.2      14 Aug 2021 20:01    TPro  6.9  /  Alb  4.1  /  TBili  0.4  /  DBili  x   /  AST  20  /  ALT  15  /  AlkPhos  72      Urinalysis Basic - ( 14 Aug 2021 21:52 )    Color: Yellow / Appearance: Clear / S.025 / pH: x  Gluc: x / Ketone: NEGATIVE  / Bili: Negative / Urobili: 0.2 E.U./dL   Blood: x / Protein: NEGATIVE mg/dL / Nitrite: NEGATIVE   Leuk Esterase: NEGATIVE / RBC: < 5 /HPF / WBC < 5 /HPF   Sq Epi: x / Non Sq Epi: 0-5 /HPF / Bacteria: Present /HPF    Lactate, Blood: 1.1 mmol/L ( @ 20:01)    RADIOLOGY, EKG & ADDITIONAL TESTS: Reviewed.

## 2021-08-15 NOTE — H&P ADULT - PROBLEM SELECTOR PLAN 4
Per note in 2019, patient found to have small focal nodular density right upper lobe posteriorly measuring 12 mm, indeterminate, concerning for neoplasm. Patient recommended to have PET/CT or biopsy  -follow up with outpatient PCP/pulmonology for monitoring. Per note in 2019, patient found to have small focal nodular density right upper lobe posteriorly measuring 12 mm, indeterminate, concerning for neoplasm. Patient recommended to have PET/CT or biopsy, reports that he had biopsy performed ~1 year ago and was told there is no concern for malignancy  -no additional follow up required Patient has history of HTN, currently normotensive.  -continue lisinopril 10mg

## 2021-08-15 NOTE — PROGRESS NOTE ADULT - PROBLEM SELECTOR PLAN 1
likely d/t medications, which include insulin; HbA1c 6.9%; glucose improved s/p dextrose; monitor blood glucose, additional work-up and mgmt per Endocrine recs; cont. statin

## 2021-08-15 NOTE — CONSULT NOTE ADULT - SUBJECTIVE AND OBJECTIVE BOX
Mandarin  [id number]  Patient is a 76 year old Mandarin speaking male with a pmhx of insulin dependent DM, HTN, thrombocytopenia, who presents w/ several episodes of hypoglycemia. Pt reports diabetes diagnosis ~3-5 y/a. Has outpatient Endo. States that he currently uses Humulin R 35 units BID (morning and evening). He states outpatient Endo recently increased dose from 30 units. He also reports JAnumet/and Jardiance were started within the last year and titrated to max dose. He monitors FSG infrequently. He admits to dosing insulin without meals, sometimes bypassing PO intake entirely. hypoglycemia ~ 50 has occurred on a daily basis over the last 3 weeks. states he has been offered basal bolus regimens in the past but due to convenience/cost issues, he prefers his current regimen.    PAST MEDICAL & SURGICAL HISTORY:  DM2, HLD, HTN    Home Medications:  atorvastatin 40 mg oral tablet: 1 tab(s) orally once a day (15 Aug 2021 01:54)  finasteride 5 mg oral tablet: 1 tab(s) orally once a day (15 Aug 2021 01:54)  Janumet 50 mg-1000 mg oral tablet: 1  orally once a day (15 Aug 2021 01:54)  Jardiance 25 mg oral tablet: 1 tab(s) orally once a day (in the morning) (15 Aug 2021 01:54)  lisinopril 10 mg oral tablet: 1 tab(s) orally once a day (15 Aug 2021 01:54)    MEDICATIONS  (STANDING):  atorvastatin 40 milliGRAM(s) Oral at bedtime  dextrose 40% Gel 15 Gram(s) Oral once  dextrose 5%. 1000 milliLiter(s) (50 mL/Hr) IV Continuous <Continuous>  dextrose 5%. 1000 milliLiter(s) (100 mL/Hr) IV Continuous <Continuous>  dextrose 50% Injectable 25 Gram(s) IV Push once  dextrose 50% Injectable 12.5 Gram(s) IV Push once  dextrose 50% Injectable 25 Gram(s) IV Push once  enoxaparin Injectable 40 milliGRAM(s) SubCutaneous every 24 hours  finasteride 5 milliGRAM(s) Oral daily  glucagon  Injectable 1 milliGRAM(s) IntraMuscular once  insulin lispro (ADMELOG) corrective regimen sliding scale   SubCutaneous three times a day before meals  lisinopril 10 milliGRAM(s) Oral daily    ICU Vital Signs Last 24 Hrs  T(C): 36.8 (15 Aug 2021 16:57), Max: 36.8 (15 Aug 2021 08:37)  T(F): 98.3 (15 Aug 2021 16:57), Max: 98.3 (15 Aug 2021 08:37)  HR: 51 (15 Aug 2021 16:57) (51 - 63)  BP: 155/81 (15 Aug 2021 16:57) (130/73 - 170/78)  BP(mean): --  ABP: --  ABP(mean): --  RR: 18 (15 Aug 2021 16:57) (16 - 18)  SpO2: 98% (15 Aug 2021 16:57) (98% - 100%)    CAPILLARY BLOOD GLUCOSE      POCT Blood Glucose.: 168 mg/dL (15 Aug 2021 12:12)  POCT Blood Glucose.: 131 mg/dL (15 Aug 2021 08:19)  POCT Blood Glucose.: 138 mg/dL (15 Aug 2021 04:41)  POCT Blood Glucose.: 177 mg/dL (15 Aug 2021 01:22)  POCT Blood Glucose.: 176 mg/dL (15 Aug 2021 00:59)  POCT Blood Glucose.: 76 mg/dL (14 Aug 2021 23:09)  POCT Blood Glucose.: 115 mg/dL (14 Aug 2021 20:48)  POCT Blood Glucose.: 241 mg/dL (14 Aug 2021 19:50)  POCT Blood Glucose.: 53 mg/dL (14 Aug 2021 19:31)  POCT Blood Glucose.: 50 mg/dL (14 Aug 2021 19:29)      A/P  1) Pt politely declines transition to basal bolus regimen, I also offered discharge w/ only basal insulin and PO meds but he wishes to remain on Humulin R treatment. I advice he reduce his dose to 25 units and only use Humulin R BID AC, and hold it if he is not eating. He is agreeable to this change and will follow up with his outpatient Endo to discuss med adjustments in more detail. May discharge on home oral hypoglycemic agents as well.    Jigar Red MD  T

## 2021-08-15 NOTE — H&P ADULT - PROBLEM SELECTOR PLAN 2
Patient presents with platelet count of 107, baseline platelets in 7/2019 . No known cause. No petechia on exam, no signs of bleeding  -f/u as outpatient  -if platelets decrease, d/c lovenox T wave inversion in lead III and aVF, however patient currently denying any chest pain, abdominal pain, or dyspnea.  -no intervention at this time

## 2021-08-15 NOTE — DISCHARGE NOTE PROVIDER - CARE PROVIDER_API CALL
Jigar Garcia (MD)  EndocrinologyMetabDiabetes; Internal Medicine  [address], [address]  [place] [address]  Phone: [phone]  Fax: [phone]  Follow Up Time:

## 2021-08-15 NOTE — H&P ADULT - HISTORY OF PRESENT ILLNESS
ED Course:  Patient initially presented with glucose of 50 on fsg, given 50cc of d50, fsg improved to 241. Patient ate dinner, repeat fsg 76, ordered for an additional 25cc of d50. Patient is a 76 year old male with a pmhx of insuline dependent DM, HTN, thrombocytopenia, who presents with report of feeling dizzy. States that earlier in the day he began to feel dizzy and lightheaded. Denies any chest pain, dyspnea, loss of consciousness or palpitations. Reports he has previously had an admission to [facility] where he was admitted for hyperglycemia in the 400s for 10 days after forgetting to take his insulin.     Follows with his PCP for his diabetes Mane Keller    Home medications:  Humulin 35mg in AM and PM   Janumet 50/1000mg  Jardiance 25mg  Atorvastatin 40mg  Lisinopril 10mg  Finasteride 5mg    ED Course:  Patient initially presented with glucose of 50 on fsg, given 50cc of d50, fsg improved to 241. Patient ate dinner, repeat fsg 76, ordered for an additional 25cc of d50. Patient is a 76 year old Mandarin speaking male with a pmhx of insulin dependent DM, HTN, thrombocytopenia, who presents with report of feeling dizzy. States that earlier in the day he began to feel dizzy and lightheaded. Denies any chest pain, dyspnea, loss of consciousness or palpitations. Denies any changes in diet, appetite, n/v/d, or recent illness. Follows with his PCP for his diabetes Mane Keller. Denies any recent changes to his medications.    Home medications:  Humulin 35mg in AM and PM   Janumet 50/1000mg  Jardiance 25mg  Atorvastatin 40mg  Lisinopril 10mg  Finasteride 5mg    ED Course:  Patient initially presented with glucose of 50 on fsg, given 50cc of d50, fsg improved to 241. Patient ate dinner, repeat fsg 76, ordered for an additional 25cc of d50 and started on d5 at 50cc/hr. Patient is a 76 year old Mandarin speaking male with a pmhx of insulin dependent DM, HTN, thrombocytopenia, who presents with report of feeling dizzy. States that earlier in the day he began to feel dizzy and lightheaded. Denies any chest pain, dyspnea, loss of consciousness or palpitations. Denies any changes in diet, appetite, n/v/d, or recent illness. Follows with his PCP for his diabetes Mane Keller. Denies any recent changes to his medications, however does state a few months ago around Spring of this year he was admitted to [facility] for 10 days for elevated blood pressure after he forgot to take his insulin one morning.    Home medications:  Humulin R 35mg in AM and PM   Janumet 50/1000mg  Jardiance 25mg  Atorvastatin 40mg  Lisinopril 10mg  Finasteride 5mg    ED Course:  Patient initially presented with glucose of 50 on fsg, given 50cc of d50, fsg improved to 241. Patient ate dinner, repeat fsg 76, ordered for an additional 25cc of d50 and started on d5 at 50cc/hr.

## 2021-08-15 NOTE — DISCHARGE NOTE PROVIDER - HOSPITAL COURSE
Patient is 75 yo M with past medical history of insulin dependent DM, htn, thrombocytopenia who presented with dizziness 2/2 hypoglycemia.    Problem List/Main Diagnoses (system-based):   1) hypoglycemia: Initial FSG 50's, s/p bolus and D5 gtt. Symptoms improved, initiated diet, discontinued gtt, and endocrine consulted for discharge med recommendations.  2) htn: c/w lisinopril 10  3) BPH: c/w finesteride 5  4) HLD: c/w atorvastatin 40  5) Thrombocytopenia: unknown etiology, stable at this time    Inpatient treatment course: Patient admitted for dizziness 2/2 hypoglycemia with FSG in the 50's. Patient given an amp in which his FSG increased; however, decreased to 70's when rechecked later. Started on D5 gtt. Patient symptomatically improved, and gtt discontinued when he was tolerating po. Endocrine consulted for outpatient med recommendations. Patient to follow-up with his endocrinologist and PCP.     New medications:   Labs to be followed outpatient:   Exam to be followed outpatient:    Patient is 77 yo M with past medical history of insulin dependent DM, htn, thrombocytopenia who presented with dizziness 2/2 hypoglycemia.    Problem List/Main Diagnoses (system-based):   1) hypoglycemia: Initial FSG 50's, s/p bolus and D5 gtt. Symptoms improved, initiated diet, discontinued gtt, and endocrine consulted for discharge med recommendations. Will decrease humalin to 25mg BID with meals, and hold if fasting. Can continue oral agents.   2) htn: c/w lisinopril 10  3) BPH: c/w finesteride 5  4) HLD: c/w atorvastatin 40  5) Thrombocytopenia: unknown etiology, stable at this time    Inpatient treatment course: Patient admitted for dizziness 2/2 hypoglycemia with FSG in the 50's. Patient given an amp in which his FSG increased; however, decreased to 70's when rechecked later. Started on D5 gtt. Patient symptomatically improved, and gtt discontinued when he was tolerating po. Endocrine consulted for outpatient med recommendations. Patient to follow-up with his endocrinologist and PCP.     New medications: decrease humalin to 25mg BID  Labs to be followed outpatient: FSG  Exam to be followed outpatient: none

## 2021-08-15 NOTE — DISCHARGE NOTE PROVIDER - NSDCMRMEDTOKEN_GEN_ALL_CORE_FT
atorvastatin 40 mg oral tablet: 1 tab(s) orally once a day  finasteride 5 mg oral tablet: 1 tab(s) orally once a day  Janumet 50 mg-1000 mg oral tablet: 1  orally once a day  Jardiance 25 mg oral tablet: 1 tab(s) orally once a day (in the morning)  lisinopril 10 mg oral tablet: 1 tab(s) orally once a day   atorvastatin 40 mg oral tablet: 1 tab(s) orally once a day  finasteride 5 mg oral tablet: 1 tab(s) orally once a day  HumuLIN R 100 units/mL injectable solution: 25 unit(s) injectable 2 times a day before breakfast and before dinner  Janumet 50 mg-1000 mg oral tablet: 1  orally once a day  Jardiance 25 mg oral tablet: 1 tab(s) orally once a day (in the morning)  lisinopril 10 mg oral tablet: 1 tab(s) orally once a day

## 2021-08-15 NOTE — ED PROVIDER NOTE - OBJECTIVE STATEMENT
PI [id number] 75 yo hx of DM, hdl, htn presenting w dizziness for the last few days. Denies head injury, per EMS BG 63 in field, given IM glucagon. Pt states  his BG has been running low over the last few days, states took insulin 35 units at 2p after lunch. Currently on Janumet, Jardiance, insulin. Denies cp, Denies associated SOB, NVD, lightheaded, diaphoresis, palpitations, cough/rhinorrhea, LE pain/swelling, focal weakness/numbness, recent travel/immobilization, abd pain, urinary complaints, f/c. Does not know PMD's name.

## 2021-08-15 NOTE — DISCHARGE NOTE NURSING/CASE MANAGEMENT/SOCIAL WORK - PATIENT PORTAL LINK FT
You can access the FollowMyHealth Patient Portal offered by [facility] by registering at the following website: [email or web address]. By joining "[employer]’s FollowMyHealth portal, you will also be able to view your health information using other applications (apps) compatible with our system.

## 2021-08-15 NOTE — DISCHARGE NOTE PROVIDER - NSDCCPCAREPLAN_GEN_ALL_CORE_FT
PRINCIPAL DISCHARGE DIAGNOSIS  Diagnosis: Hypoglycemia  Assessment and Plan of Treatment: you were found to have low sugar levels, leading you to be dizzy this morning. you were given sugar through an IV and your sugar levels ashley to normal levels. You then began to feel better. Our endocrinologist was consulted who recommended who only take your humulin R 25mg before breakfast, and before dinner. Please do not take your insulin if you are not eating food. Please follow up with your endocrinologist. THe information for the endocrinologist that saw you here will be provided as well. His name was Dr. Red (endocrine).      SECONDARY DISCHARGE DIAGNOSES  Diagnosis: Diabetes mellitus  Assessment and Plan of Treatment:

## 2021-08-15 NOTE — PROGRESS NOTE ADULT - SUBJECTIVE AND OBJECTIVE BOX
Patient is a 76y old  Male who presents with a chief complaint of     INTERVAL HPI/OVERNIGHT EVENTS:    Pt. seen and examined earlier today  No complaints elicited     Review of Systems: 12 point review of systems otherwise negative    MEDICATIONS  (STANDING):  atorvastatin 40 milliGRAM(s) Oral at bedtime  dextrose 40% Gel 15 Gram(s) Oral once  dextrose 5%. 1000 milliLiter(s) (50 mL/Hr) IV Continuous <Continuous>  dextrose 5%. 1000 milliLiter(s) (100 mL/Hr) IV Continuous <Continuous>  dextrose 50% Injectable 25 Gram(s) IV Push once  dextrose 50% Injectable 12.5 Gram(s) IV Push once  dextrose 50% Injectable 25 Gram(s) IV Push once  enoxaparin Injectable 40 milliGRAM(s) SubCutaneous every 24 hours  finasteride 5 milliGRAM(s) Oral daily  glucagon  Injectable 1 milliGRAM(s) IntraMuscular once  insulin lispro (ADMELOG) corrective regimen sliding scale   SubCutaneous three times a day before meals  lisinopril 10 milliGRAM(s) Oral daily    MEDICATIONS  (PRN):  acetaminophen   Tablet .. 650 milliGRAM(s) Oral every 6 hours PRN Temp greater or equal to 38.5C (101.3F), Mild Pain (1 - 3)  melatonin 3 milliGRAM(s) Oral at bedtime PRN Insomnia      Allergies    No Known Allergies    Intolerances          Vital Signs Last 24 Hrs  T(C): 36.8 (15 Aug 2021 16:57), Max: 36.8 (15 Aug 2021 08:37)  T(F): 98.3 (15 Aug 2021 16:57), Max: 98.3 (15 Aug 2021 08:37)  HR: 51 (15 Aug 2021 16:57) (51 - 63)  BP: 155/81 (15 Aug 2021 16:57) (130/73 - 170/78)  BP(mean): --  RR: 18 (15 Aug 2021 16:57) (16 - 18)  SpO2: 98% (15 Aug 2021 16:57) (98% - 100%)  CAPILLARY BLOOD GLUCOSE      POCT Blood Glucose.: 168 mg/dL (15 Aug 2021 12:12)  POCT Blood Glucose.: 131 mg/dL (15 Aug 2021 08:19)  POCT Blood Glucose.: 138 mg/dL (15 Aug 2021 04:41)  POCT Blood Glucose.: 177 mg/dL (15 Aug 2021 01:22)  POCT Blood Glucose.: 176 mg/dL (15 Aug 2021 00:59)  POCT Blood Glucose.: 76 mg/dL (14 Aug 2021 23:09)  POCT Blood Glucose.: 115 mg/dL (14 Aug 2021 20:48)  POCT Blood Glucose.: 241 mg/dL (14 Aug 2021 19:50)  POCT Blood Glucose.: 53 mg/dL (14 Aug 2021 19:31)  POCT Blood Glucose.: 50 mg/dL (14 Aug 2021 19:29)        Physical Exam:  (earlier today)  Daily Height in cm: 160 (14 Aug 2021 19:37)    Daily   General:  comfortable-appearing in NAD  HEENT:  MMM  CV:  RRR  Lungs:  CTA B/L anteriorly  Abdomen:  soft NT ND  Extremities:  no edema B/L LE  Skin:  WWP  Neuro:  AAOx3    LABS:                        14.5   5.27  )-----------( 104      ( 15 Aug 2021 08:16 )             44.8     08-15    142  |  109<H>  |  17  ----------------------------<  157<H>  4.0   |  24  |  0.81    Ca    8.7      15 Aug 2021 08:16  Phos  3.6     08-15  Mg     2.2     08-15    TPro  6.7  /  Alb  4.1  /  TBili  1.0  /  DBili  x   /  AST  15  /  ALT  14  /  AlkPhos  55  08-15      Urinalysis Basic - ( 14 Aug 2021 21:52 )    Color: Yellow / Appearance: Clear / S.025 / pH: x  Gluc: x / Ketone: NEGATIVE  / Bili: Negative / Urobili: 0.2 E.U./dL   Blood: x / Protein: NEGATIVE mg/dL / Nitrite: NEGATIVE   Leuk Esterase: NEGATIVE / RBC: < 5 /HPF / WBC < 5 /HPF   Sq Epi: x / Non Sq Epi: 0-5 /HPF / Bacteria: Present /HPF

## 2021-08-15 NOTE — H&P ADULT - PROBLEM SELECTOR PLAN 3
Patient has history of HTN, currently normotensive.  -continue lisinopril 10mg Patient presents with platelet count of 107, baseline platelets in 7/2019 . No known cause. No petechia on exam, no signs of bleeding  -f/u as outpatient  -if platelets decrease, d/c lovenox

## 2021-08-15 NOTE — H&P ADULT - ASSESSMENT
Patient is a 76 year old male with a pmhx of insuline dependent DM, HTN, thrombocytopenia, admitted for symptomatic hypoglycemia. Patient is a 76 year old male with a pmhx of insuline dependent DM, HTN, thrombocytopenia, found to have glucose of 50, admitted for symptomatic hypoglycemia.

## 2021-08-15 NOTE — ED PROVIDER NOTE - CLINICAL SUMMARY MEDICAL DECISION MAKING FREE TEXT BOX
Pt w persisting hypoglycemia s/p glucagon, 1 amp dextrose and dinner, poor historian on multiple DM medications., cannot remember PMD's name, language barrier present. Will admit for monitoring and reevaluation of medications,safe dispo at this time. Pt w persisting hypoglycemia s/p glucagon, 1 amp dextrose and dinner, poor historian on multiple DM medications and not taking insulin appropriately, language barrier present. Will admit for monitoring and reevaluation of medications, unsafe dispo at this time.

## 2021-08-15 NOTE — H&P ADULT - ATTENDING COMMENTS
76 year old male with a pmhx of insuline dependent DM, HTN, thrombocytopenia, found to have glucose of 50, admitted for symptomatic hypoglycemia.  #Hypoglycemia: Likely 2/2 polypharmacy- Pt reports AM sugars usually ~80s; f/up A1c, suspect tightly controlled BGL. Current regimen includes Humalin R 35U BID, jariance and Janumet- prescribed by PCP, unclear why not on LA insulin. Pt denies decreased PO intake, n/v, diarhea, no new medication, no overuse of prescribed meds. No s/s adrenal insufficiency. Started on d5 in ED- f/up FSG q2-4hrs and wean off as tolerated. Monitor FSG, insulin requirements and adjust insulin if needed. f/up A1c, TSH, hypoglyecemia w/up; endocrine consult in AM Language Line Interpretor: Eli ID# [id number]    76 year old male with a pmhx of insuline dependent DM, HTN, thrombocytopenia, found to have glucose of 50, admitted for symptomatic hypoglycemia.  #Hypoglycemia: Likely 2/2 polypharmacy- Pt reports AM sugars usually ~80s; f/up A1c, suspect tightly controlled BGL. Current regimen includes Humalin R 35U BID, jariance and Janumet- prescribed by PCP, unclear why not on LA insulin. Pt denies decreased PO intake, n/v, diarhea, no new medication, no overuse of prescribed meds. No s/s adrenal insufficiency. Started on d5 in ED- f/up FSG q2-4hrs and wean off as tolerated. Monitor FSG, insulin requirements and adjust insulin if needed. f/up A1c, TSH, hypoglyecemia w/up; endocrine consult in AM

## 2021-08-15 NOTE — H&P ADULT - PROBLEM SELECTOR PLAN 5
Diet: consistent carb  E: replete as indicated  DVT: lovenox 40mg, monitor platelet counts, d/c if downtrending  Dispo: [facility]  Code: full code Per note in 2019, patient found to have small focal nodular density right upper lobe posteriorly measuring 12 mm, indeterminate, concerning for neoplasm. Patient recommended to have PET/CT or biopsy, reports that he had biopsy performed ~1 year ago and was told there is no concern for malignancy  -no additional follow up required Diet: consistent carb  E: replete as indicated  DVT: lovenox 40mg, monitor platelet counts, d/c if downtrending  Dispo: [facility]  Code: full code

## 2021-08-16 PROBLEM — Z00.00 ENCOUNTER FOR PREVENTIVE HEALTH EXAMINATION: Status: ACTIVE | Noted: 2021-08-16

## 2021-08-18 LAB — PROINSULIN SERPL-MCNC: 2.9 PMOL/L — SIGNIFICANT CHANGE UP (ref 0–10)

## 2021-08-19 DIAGNOSIS — E11.649 TYPE 2 DIABETES MELLITUS WITH HYPOGLYCEMIA WITHOUT COMA: ICD-10-CM

## 2021-08-19 DIAGNOSIS — E78.5 HYPERLIPIDEMIA, UNSPECIFIED: ICD-10-CM

## 2021-08-19 DIAGNOSIS — D69.6 THROMBOCYTOPENIA, UNSPECIFIED: ICD-10-CM

## 2021-08-19 DIAGNOSIS — R91.1 SOLITARY PULMONARY NODULE: ICD-10-CM

## 2021-08-19 DIAGNOSIS — Z79.4 LONG TERM (CURRENT) USE OF INSULIN: ICD-10-CM

## 2021-08-19 DIAGNOSIS — I10 ESSENTIAL (PRIMARY) HYPERTENSION: ICD-10-CM

## 2021-08-19 DIAGNOSIS — Z79.899 OTHER LONG TERM (CURRENT) DRUG THERAPY: ICD-10-CM

## 2021-08-19 DIAGNOSIS — N40.0 BENIGN PROSTATIC HYPERPLASIA WITHOUT LOWER URINARY TRACT SYMPTOMS: ICD-10-CM

## 2021-08-26 LAB — SULFONYLUREA SERPL-MCNC: SIGNIFICANT CHANGE UP

## 2021-09-30 NOTE — PATIENT PROFILE ADULT - FUNCTIONAL SCREEN CURRENT LEVEL: DRESSING, MLM
Detail Level: Detailed
Quality 130: Documentation Of Current Medications In The Medical Record: Current Medications Documented
Quality 431: Preventive Care And Screening: Unhealthy Alcohol Use - Screening: Patient identified as an unhealthy alcohol user when screened for unhealthy alcohol use using a systematic screening method and received brief counseling
Quality 226: Preventive Care And Screening: Tobacco Use: Screening And Cessation Intervention: Patient screened for tobacco use and is an ex/non-smoker
0 = independent

## 2021-10-17 NOTE — ED PROVIDER NOTE - ENMT, MLM
Airway patent, Nasal mucosa clear. Mouth with normal mucosa. Throat has no vesicles, no oropharyngeal exudates and uvula is midline. non-distended

## 2021-11-18 NOTE — ED PROVIDER NOTE - CROS ED RESP ALL NEG
[FreeTextEntry1] : 60-year-old woman with a history of left leg posttraumatic arthrosis and pain and right leg and hip pain.  Neurologic examination does not provide any evidence for acute deficit except for sensory loss which is not radicular in origin.\par She does have absence of deep tendon reflexes in upper and lower extremities which may be unrelated to the above and will require further investigation
- - -

## 2022-02-08 NOTE — PROCEDURE NOTE - NSTYPEOFDEBRIDE_SKIN_ALL_CORE
1) Follow up with cardiology within 3-4 days.  2) Return to the ED immediately for new or worsening symptoms as we discussed.                                                                                                                   Chest Wall Pain      Chest wall pain is pain in or around the bones and muscles of your chest. Sometimes, an injury causes this pain. Excessive coughing or overuse of arm and chest muscles may also cause chest wall pain. Sometimes, the cause may not be known. This pain may take several weeks or longer to get better.      Follow these instructions at home:      Managing pain, stiffness, and swelling    •If directed, put ice on the painful area:  •Put ice in a plastic bag.      •Place a towel between your skin and the bag.      •Leave the ice on for 20 minutes, 2–3 times per day.        Activity     •Rest as told by your health care provider.      •Avoid activities that cause pain. These include any activities that use your chest muscles or your abdominal and side muscles to lift heavy items. Ask your health care provider what activities are safe for you.        General instructions      •Take over-the-counter and prescription medicines only as told by your health care provider.      • Do not use any products that contain nicotine or tobacco, such as cigarettes, e-cigarettes, and chewing tobacco. These can delay healing after injury. If you need help quitting, ask your health care provider.      •Keep all follow-up visits as told by your health care provider. This is important.        Contact a health care provider if:    •You have a fever.      •Your chest pain becomes worse.      •You have new symptoms.        Get help right away if:    •You have nausea or vomiting.      •You feel sweaty or light-headed.      •You have a cough with mucus from your lungs (sputum) or you cough up blood.      •You develop shortness of breath.      These symptoms may represent a serious problem that is an emergency. Do not wait to see if the symptoms will go away. Get medical help right away. Call your local emergency services (911 in the [place] Do not drive yourself to the hospital.       Summary    •Chest wall pain is pain in or around the bones and muscles of your chest.      •Depending on the cause, it may be treated with ice, rest, medicines, and avoiding activities that cause pain.      •Contact a health care provider if you have a fever, worsening chest pain, or new symptoms.      •Get help right away if you feel light-headed or you develop shortness of breath. These symptoms may be an emergency.      This information is not intended to replace advice given to you by your health care provider. Make sure you discuss any questions you have with your health care provider.      Document Revised: 06/20/2019 Document Reviewed: 06/20/2019    Elsevier Patient Education © 2021 [employer] Exicisional ...

## 2022-02-18 NOTE — PROGRESS NOTE ADULT - PROBLEM SELECTOR PLAN 5
Anesthesia Pre Eval Note    Anesthesia ROS/Med Hx          Cardiovascular Review:    Positive for hypertension  Positive for hyperlipidemia    End/Other Review:  Positive for diabetes  Positive for cancer  Additional Results:     ALLERGIES:   -- Adhesive   (Environmental) -- HIVES   -- Strawberry   (Food Or Med) -- HIVES       Lab Results       Component                Value               Date                       WBC                      5.8                 02/18/2022                 WBC                      7.4                 07/29/2018                 RBC                      4.04                02/18/2022                 RBC                      4.33                07/29/2018                 HGB                      12.5                02/18/2022                 HGB                      13.3                07/29/2018                 HCT                      37.6                02/18/2022                 MCHC                     33.2                02/18/2022                 MCHC                     34.0                07/29/2018                 SODIUM                   132 (L)             02/18/2022                 SODIUM                   133 (L)             10/30/2020                 POTASSIUM                4.0                 02/18/2022                 POTASSIUM                4.4                 10/30/2020                 CHLORIDE                 106                 02/18/2022                 CHLORIDE                 104                 10/30/2020                 CO2                      24                  02/18/2022                 CO2                      23                  10/30/2020                 GLUCOSE                  123 (H)             02/18/2022                 GLUCOSE                  76                  10/30/2020                 BUN                      8                   02/18/2022                 BUN                      7                   10/30/2020                 CREATININE         
      0.65                02/18/2022                 CREATININE               0.81                10/30/2020                 GFRESTIMATE              >90                 02/18/2022                 GFRA                     >90                 10/30/2020                 GFRNA                    78                  10/30/2020                 CALCIUM                  8.4                 02/18/2022                 CALCIUM                  8.7                 10/30/2020                 PLT                      227                 02/18/2022                 PLT                      212                 07/29/2018                 PTT                      23                  02/17/2022                 PTT                      25                  07/29/2018                 PTT                      NOT APPLICABLE      07/29/2018                 INR                      0.9                 02/17/2022                 INR                      0.9                 07/29/2018             Past Medical History:  No date: Allergy  No date: Diabetes mellitus (CMS/HCC)  No date: Essential (primary) hypertension    Past Surgical History:  No date: Breast surgery  No date: Joint replacement       Prior to Admission medications :  Medication escitalopram (LEXAPRO) 20 MG tablet, Sig Take 1 tablet by mouth daily., Start Date 2/17/22, End Date , Taking? Yes, Authorizing Provider Akhil Gustafson MD    Medication metFORMIN (GLUCOPHAGE) 500 MG tablet, Sig TAKE 1 TABLET BY MOUTH TWICE DAILY WITH MEALS, Start Date 12/29/21, End Date , Taking? Yes, Authorizing Provider David Mascorro MD    Medication lisinopril (ZESTRIL) 20 MG tablet, Sig TAKE 1/2 TABLET BY MOUTH DAILY  Patient taking differently: Take 10 mg by mouth every 48 hours. , Start Date 11/1/21, End Date , Taking? Yes, Authorizing Provider David Mascorro MD    Medication atorvastatin (LIPITOR) 20 MG tablet, Sig Take 1 tablet by mouth daily., Start Date 12/6/19, End Date 2/17/22, Taking? 
Yes, Authorizing Provider Alin Moore MD         Patient Vitals in the past 24 hrs:  02/18/22 0922, Resp:16 02/18/22 0822, Resp:15  02/18/22 0732, BP:126/78, Temp:36.8 °C (98.2 °F), Temp src:Oral, Pulse:69, SpO2:90 %  02/18/22 0451, BP:125/76, Temp:37 °C (98.6 °F), Temp src:Oral, Pulse:73, SpO2:93 %  02/18/22 0447, Resp:16 02/17/22 1951, BP:(!) 159/80, Temp:37.1 °C (98.8 °F), Temp src:Oral, Pulse:71, Resp:18, SpO2:97 %  02/17/22 1946, Height:5' 6\" (1.676 m), Weight:69.4 kg (153 lb)  02/17/22 1514, BP:(!) 142/70, Pulse:72, Resp:18, SpO2:99 %      Relevant Problems   No relevant active problems       Physical Exam     Airway   Mallampati: II  TM Distance: >3 FB  Neck ROM: Full  Neck: Non-tender and Able to place in sniff position  TMJ Mobility: Good    Cardiovascular  Cardiovascular exam normal  Cardio Rhythm: Regular  Cardio Rate: Normal    Head Assessment  Head assessment: Normocephalic and Atraumatic    General Assessment  General Assessment: Alert and oriented and No acute distress    Dental Exam  Dental exam normal    Pulmonary Exam  Pulmonary exam normal  Breath sounds clear to auscultation:  Yes  Patient Demonstrates:  Non-labored Breathing    Abdominal Exam  Abdominal exam normal    Other Findings  Blood pressure 126/78, pulse 69, temperature 36.8 °C (98.2 °F), temperature source Oral, resp. rate 16, height 5' 6\" (1.676 m), weight 69.4 kg (153 lb), SpO2 90 %.      Anesthesia Plan:    ASA Status: 3  Anesthesia Type: General    Induction: Intravenous  Preferred Airway Type: ETT  Maintenance: Inhalational  Premedication: IV  Patient does not have an implantable electronic device requiring post procedure programming.     Post-op Pain Management: Per Surgeon      Checklist  Reviewed: NPO Status, Allergies, Medications, Problem list and Past Med History  Consent/Risks Discussed Statement:  The proposed anesthetic plan, including its risks and benefits, have been discussed with the Patient along with the risks 
and benefits of alternatives. Questions were encouraged and answered and the patient and/or representative understands and agrees to proceed.        I discussed with the patient (and/or patient's legal representative) the risks and benefits of the proposed anesthesia plan, General, which may include services performed by other anesthesia providers.    Alternative anesthesia plans, if available, were reviewed with the patient (and/or patient's legal representative). Discussion has been held with the patient (and/or patient's legal representative) regarding risks of anesthesia, which include Nausea, Vomiting, Dental Injury, allergic reaction, anxiety, aspiration, vomiting, nausea, sore throat, post-op intubation, eye injury, dental injury and depressed breathing and emergent situations that may require change in anesthesia plan.    The patient (and/or patient's legal representative) has indicated understanding, his/her questions have been answered, and he/she wishes to proceed with the planned anesthetic.    Blood Products: Not Anticipated    
initial presentation blood sugar level 419->276, increased AG of 22, now 11  -manage as DM above
initial presentation blood sugar level 419->276, increased AG of 22, now 11. Now resolved.   -manage as DM above.

## 2022-02-19 NOTE — ED ADULT NURSE NOTE - CHIEF COMPLAINT
29 y/o female with a past medical history of endometriosis, elective  and a progestin IUD placed as of 2021 presents to the ER for vaginal bleeding. Patient states she had 10 days of vaginal bleeding during menstruation and then stopped for a day. Patient reports she had sexual intercourse yesterday and started bleeding. She went to [facility] for an STD workup and was referred to the ER for an ultrasound to check placement for IUD. Once patient got to the ER, she reports mild lower abdominal cramping, but otherwise, she has no other pain. Patient denies any vaginal pain or trauma from intercourse and also denies any fever or chills. Urine Patient had urine pregnancy done at [facility] and was negative. Patient is allergic to penicillin. The patient is a 74y Male complaining of altered mental status. 27 y/o female with a past medical history of endometriosis, elective  and a progestin IUD placed as of 2021 presents to the ER for vaginal bleeding. Patient states she had 10 days of vaginal bleeding during menstruation and then stopped for a day. Patient reports she had sexual intercourse yesterday and started bleeding. She went to [facility] for an STD workup and was referred to the ER for an ultrasound to check placement for IUD. Once patient got to the ER, she reports mild lower abdominal cramping, but otherwise, she has no other pain. Patient denies any vaginal pain or trauma from intercourse and also denies any fever or chills. Urine Patient had urine pregnancy done at [facility] and was negative.  Also had pelvic exam and STD testing done at [facility].  (pt states sexually active with only one partner, low suspicion for STDs) Patient is allergic to penicillin.

## 2022-02-25 NOTE — PROGRESS NOTE ADULT - PROBLEM SELECTOR PROBLEM 3
[facility]ORDIN[facility]  [facility]  [address] [place] [address]    February 25, 2022    Sabi Alvarez  [address] [facility] [address]      Dear Sabi and Parent,    I have been attempting to reach you since our last contact. I would like to continue to work with you and provide any additional support you may need on achieving your health care related goals. I would appreciate if you would give me a call at [phone] to let me know if you would like to continue working together. I know that there are many things that can affect our ability to communicate and I hope we can continue to work together.    All of us at the [facility] are invested in your health and are here to assist you in meeting your goals.     Sincerely,    CHRISTIANO Stevenson  , Care Coordination  [facility]  [facility]  [facility]  [phone]   [place]

## 2022-06-04 ENCOUNTER — EMERGENCY (EMERGENCY)
Facility: HOSPITAL | Age: 77
LOS: 1 days | Discharge: HOME | End: 2022-06-04
Attending: EMERGENCY MEDICINE | Admitting: EMERGENCY MEDICINE
Payer: MEDICARE

## 2022-06-04 VITALS
RESPIRATION RATE: 18 BRPM | SYSTOLIC BLOOD PRESSURE: 158 MMHG | OXYGEN SATURATION: 96 % | WEIGHT: 242.51 LBS | TEMPERATURE: 98 F | DIASTOLIC BLOOD PRESSURE: 85 MMHG | HEART RATE: 88 BPM

## 2022-06-04 DIAGNOSIS — I10 ESSENTIAL (PRIMARY) HYPERTENSION: ICD-10-CM

## 2022-06-04 DIAGNOSIS — E78.5 HYPERLIPIDEMIA, UNSPECIFIED: ICD-10-CM

## 2022-06-04 DIAGNOSIS — Z20.822 CONTACT WITH AND (SUSPECTED) EXPOSURE TO COVID-19: ICD-10-CM

## 2022-06-04 DIAGNOSIS — E10.65 TYPE 1 DIABETES MELLITUS WITH HYPERGLYCEMIA: ICD-10-CM

## 2022-06-04 DIAGNOSIS — E86.0 DEHYDRATION: ICD-10-CM

## 2022-06-04 DIAGNOSIS — R53.1 WEAKNESS: ICD-10-CM

## 2022-06-04 LAB
ALBUMIN SERPL ELPH-MCNC: 4.3 G/DL — SIGNIFICANT CHANGE UP (ref 3.3–5)
ALP SERPL-CCNC: 400 U/L — HIGH (ref 40–120)
ALT FLD-CCNC: 21 U/L — SIGNIFICANT CHANGE UP (ref 10–45)
ANION GAP SERPL CALC-SCNC: 15 MMOL/L — SIGNIFICANT CHANGE UP (ref 5–17)
ANISOCYTOSIS BLD QL: SLIGHT — SIGNIFICANT CHANGE UP
APPEARANCE UR: CLEAR — SIGNIFICANT CHANGE UP
APTT BLD: 29.6 SEC — SIGNIFICANT CHANGE UP (ref 27.5–35.5)
AST SERPL-CCNC: 18 U/L — SIGNIFICANT CHANGE UP (ref 10–40)
B-OH-BUTYR SERPL-SCNC: 0.2 MMOL/L — SIGNIFICANT CHANGE UP
BACTERIA # UR AUTO: PRESENT /HPF
BASOPHILS # BLD AUTO: 0 K/UL — SIGNIFICANT CHANGE UP (ref 0–0.2)
BASOPHILS NFR BLD AUTO: 0 % — SIGNIFICANT CHANGE UP (ref 0–2)
BILIRUB SERPL-MCNC: 0.7 MG/DL — SIGNIFICANT CHANGE UP (ref 0.2–1.2)
BILIRUB UR-MCNC: NEGATIVE — SIGNIFICANT CHANGE UP
BUN SERPL-MCNC: 23 MG/DL — SIGNIFICANT CHANGE UP (ref 7–23)
CALCIUM SERPL-MCNC: 9.1 MG/DL — SIGNIFICANT CHANGE UP (ref 8.4–10.5)
CHLORIDE SERPL-SCNC: 99 MMOL/L — SIGNIFICANT CHANGE UP (ref 96–108)
CK MB CFR SERPL CALC: 2.3 NG/ML — SIGNIFICANT CHANGE UP (ref 0–6.7)
CK SERPL-CCNC: 91 U/L — SIGNIFICANT CHANGE UP (ref 30–200)
CO2 SERPL-SCNC: 20 MMOL/L — LOW (ref 22–31)
COLOR SPEC: YELLOW — SIGNIFICANT CHANGE UP
CREAT SERPL-MCNC: 0.86 MG/DL — SIGNIFICANT CHANGE UP (ref 0.5–1.3)
DIFF PNL FLD: ABNORMAL
EGFR: 90 ML/MIN/1.73M2 — SIGNIFICANT CHANGE UP
EOSINOPHIL # BLD AUTO: 0.05 K/UL — SIGNIFICANT CHANGE UP (ref 0–0.5)
EOSINOPHIL NFR BLD AUTO: 0.9 % — SIGNIFICANT CHANGE UP (ref 0–6)
EPI CELLS # UR: SIGNIFICANT CHANGE UP /HPF (ref 0–5)
GIANT PLATELETS BLD QL SMEAR: PRESENT — SIGNIFICANT CHANGE UP
GLUCOSE SERPL-MCNC: 535 MG/DL — CRITICAL HIGH (ref 70–99)
GLUCOSE UR QL: >=1000
HCT VFR BLD CALC: 43.3 % — SIGNIFICANT CHANGE UP (ref 39–50)
HGB BLD-MCNC: 14.6 G/DL — SIGNIFICANT CHANGE UP (ref 13–17)
INR BLD: 0.98 — SIGNIFICANT CHANGE UP (ref 0.88–1.16)
KETONES UR-MCNC: NEGATIVE — SIGNIFICANT CHANGE UP
LACTATE SERPL-SCNC: 1.6 MMOL/L — SIGNIFICANT CHANGE UP (ref 0.5–2)
LACTATE SERPL-SCNC: 2.9 MMOL/L — HIGH (ref 0.5–2)
LEUKOCYTE ESTERASE UR-ACNC: NEGATIVE — SIGNIFICANT CHANGE UP
LIDOCAIN IGE QN: 18 U/L — SIGNIFICANT CHANGE UP (ref 7–60)
LYMPHOCYTES # BLD AUTO: 0.17 K/UL — LOW (ref 1–3.3)
LYMPHOCYTES # BLD AUTO: 2.8 % — LOW (ref 13–44)
MAGNESIUM SERPL-MCNC: 2.2 MG/DL — SIGNIFICANT CHANGE UP (ref 1.6–2.6)
MANUAL SMEAR VERIFICATION: SIGNIFICANT CHANGE UP
MCHC RBC-ENTMCNC: 30.7 PG — SIGNIFICANT CHANGE UP (ref 27–34)
MCHC RBC-ENTMCNC: 33.7 GM/DL — SIGNIFICANT CHANGE UP (ref 32–36)
MCV RBC AUTO: 91.2 FL — SIGNIFICANT CHANGE UP (ref 80–100)
MONOCYTES # BLD AUTO: 0.39 K/UL — SIGNIFICANT CHANGE UP (ref 0–0.9)
MONOCYTES NFR BLD AUTO: 6.6 % — SIGNIFICANT CHANGE UP (ref 2–14)
NEUTROPHILS # BLD AUTO: 5.36 K/UL — SIGNIFICANT CHANGE UP (ref 1.8–7.4)
NEUTROPHILS NFR BLD AUTO: 88.8 % — HIGH (ref 43–77)
NEUTS BAND # BLD: 0.9 % — SIGNIFICANT CHANGE UP (ref 0–8)
NITRITE UR-MCNC: NEGATIVE — SIGNIFICANT CHANGE UP
NT-PROBNP SERPL-SCNC: 98 PG/ML — SIGNIFICANT CHANGE UP (ref 0–300)
OSMOLALITY SERPL: 317 MOSM/KG — HIGH (ref 280–301)
OVALOCYTES BLD QL SMEAR: SLIGHT — SIGNIFICANT CHANGE UP
PH UR: 6 — SIGNIFICANT CHANGE UP (ref 5–8)
PLAT MORPH BLD: ABNORMAL
PLATELET # BLD AUTO: 93 K/UL — LOW (ref 150–400)
POIKILOCYTOSIS BLD QL AUTO: SLIGHT — SIGNIFICANT CHANGE UP
POTASSIUM SERPL-MCNC: 4.8 MMOL/L — SIGNIFICANT CHANGE UP (ref 3.5–5.3)
POTASSIUM SERPL-SCNC: 4.8 MMOL/L — SIGNIFICANT CHANGE UP (ref 3.5–5.3)
PROT SERPL-MCNC: 7.2 G/DL — SIGNIFICANT CHANGE UP (ref 6–8.3)
PROT UR-MCNC: NEGATIVE MG/DL — SIGNIFICANT CHANGE UP
PROTHROM AB SERPL-ACNC: 11.6 SEC — SIGNIFICANT CHANGE UP (ref 10.5–13.4)
RBC # BLD: 4.75 M/UL — SIGNIFICANT CHANGE UP (ref 4.2–5.8)
RBC # FLD: 12.3 % — SIGNIFICANT CHANGE UP (ref 10.3–14.5)
RBC BLD AUTO: ABNORMAL
RBC CASTS # UR COMP ASSIST: < 5 /HPF — SIGNIFICANT CHANGE UP
SARS-COV-2 RNA SPEC QL NAA+PROBE: NEGATIVE — SIGNIFICANT CHANGE UP
SMUDGE CELLS # BLD: PRESENT — SIGNIFICANT CHANGE UP
SODIUM SERPL-SCNC: 134 MMOL/L — LOW (ref 135–145)
SP GR SPEC: <=1.005 — SIGNIFICANT CHANGE UP (ref 1–1.03)
TROPONIN T SERPL-MCNC: 0.01 NG/ML — SIGNIFICANT CHANGE UP (ref 0–0.01)
UROBILINOGEN FLD QL: 0.2 E.U./DL — SIGNIFICANT CHANGE UP
WBC # BLD: 5.97 K/UL — SIGNIFICANT CHANGE UP (ref 3.8–10.5)
WBC # FLD AUTO: 5.97 K/UL — SIGNIFICANT CHANGE UP (ref 3.8–10.5)
WBC UR QL: < 5 /HPF — SIGNIFICANT CHANGE UP

## 2022-06-04 PROCEDURE — 71045 X-RAY EXAM CHEST 1 VIEW: CPT | Mod: 26

## 2022-06-04 PROCEDURE — 93010 ELECTROCARDIOGRAM REPORT: CPT

## 2022-06-04 PROCEDURE — 99285 EMERGENCY DEPT VISIT HI MDM: CPT

## 2022-06-04 RX ORDER — SODIUM CHLORIDE 9 MG/ML
1000 INJECTION INTRAMUSCULAR; INTRAVENOUS; SUBCUTANEOUS ONCE
Refills: 0 | Status: COMPLETED | OUTPATIENT
Start: 2022-06-04 | End: 2022-06-04

## 2022-06-04 RX ORDER — INSULIN HUMAN 100 [IU]/ML
5 INJECTION, SOLUTION SUBCUTANEOUS ONCE
Refills: 0 | Status: COMPLETED | OUTPATIENT
Start: 2022-06-04 | End: 2022-06-04

## 2022-06-04 RX ADMIN — INSULIN HUMAN 5 UNIT(S): 100 INJECTION, SOLUTION SUBCUTANEOUS at 21:02

## 2022-06-04 RX ADMIN — SODIUM CHLORIDE 1000 MILLILITER(S): 9 INJECTION INTRAMUSCULAR; INTRAVENOUS; SUBCUTANEOUS at 19:53

## 2022-06-04 RX ADMIN — SODIUM CHLORIDE 1000 MILLILITER(S): 9 INJECTION INTRAMUSCULAR; INTRAVENOUS; SUBCUTANEOUS at 21:58

## 2022-06-04 RX ADMIN — SODIUM CHLORIDE 1000 MILLILITER(S): 9 INJECTION INTRAMUSCULAR; INTRAVENOUS; SUBCUTANEOUS at 20:53

## 2022-06-04 NOTE — ED ADULT NURSE NOTE - OBJECTIVE STATEMENT
.  77years male alert mental state (AOX3) received by EMS.  -complain of hyperglycemia.  Hx of DM. pt states that he thought his glucose was low and called EMS. EMS checked his FS(569). pt presents urinary incontinences, general weakness, chest discomfort.  -denied SOB, dizziness, headache, n/v/d, abdomen pain.  Pt is in the bed comfortably at this time. Will continue to monitor and document any changes.

## 2022-06-04 NOTE — ED PROVIDER NOTE - CLINICAL SUMMARY MEDICAL DECISION MAKING FREE TEXT BOX
avss. nontoxic. NAD. no systemic sx. found to have hyperglycemia w/o dka/hhs. s/p ivf/insulin. lactate 2s>1s. no leukocytosis vs significant anemia vs electrolyte abnl. no uti on ua. covid neg. trop wnl. ekg w/o significant st/t changes. cxr w/o acute focal consol vs ptx vs pulm edema vs widened mediastinum. sx improved. tolerating po. steady gait. feels safe going home. no indication for inpatient hospitalization at this time. will dc w/ outpatient pcp fu. reinforced taking Rx. strict return precautions. pt agrees w/ plan. questions answered.

## 2022-06-04 NOTE — ED PROVIDER NOTE - PATIENT PORTAL LINK FT
You can access the FollowMyHealth Patient Portal offered by [facility] by registering at the following website: [email or web address]. By joining [facility]’s FollowMyHealth portal, you will also be able to view your health information using other applications (apps) compatible with our system.

## 2022-06-04 NOTE — ED PROVIDER NOTE - NSFOLLOWUPINSTRUCTIONS_ED_ALL_ED_FT
TAKE MEDICATION AS PRESCRIBED. PLEASE FOLLOW-UP WITH YOUR PCP IN 1-2 DAYS.    ANY IMAGING RESULTS GIVEN IN THE EMERGENCY DEPARTMENT ARE PRELIMINARY UNTIL FORMALLY READ BY A RADIOLOGIST. YOU WILL BE CONTACTED IF THERE ARE ANY SIGNIFICANT CHANGES IN THE FINAL READ.    PLEASE RETURN TO THE EMERGENCY DEPARTMENT IF FEVER, CHEST PAIN, SHORTNESS OF BREATH, ABDOMINAL PAIN, VOMITING, OTHER CONCERNING SYMPTOMS.    PLEASE CONTACT MERLENE MUIR ([facility] EMERGENCY DEPARTMENT CLINICAL REFERRAL COORDINATOR) TO ASSIST IN SCHEDULING YOUR FOLLOW-UP APPOINTMENT.    Monday - Friday 11am-7pm  [phone]  [email or web address]

## 2022-06-04 NOTE — ED PROVIDER NOTE - OBJECTIVE STATEMENT
77M mandarin/english-speaking, nonsmoker, htn, hld, iddm, multiple ED visits for hyperglycemia, biba from street c/o <24h progressive generalized weakness. pt reports attempting to put food kiosk out w/ son but was unable to get out of his vehicle because of generalized weakness so son called ems. pt admits to accidentally urinating on himself because he couldn't make it to the bathroom in time. found by ems to have glc 500s. no fever/chills, no ha/dizziness, no neck pain/stiffness, no uri/cough, no cp/sob, no abd pain/n/v, no diarrhea, no hematochezia/melena, no change in appetite/po intake, no dysuria, no rash, no trauma, no etoh-dpt/ivdu.     at baseline, a+ox3, +ADLs, ambulates unassisted, lives at home w/ wife/son.    : aleshia [id number]

## 2022-06-04 NOTE — ED ADULT TRIAGE NOTE - OTHER COMPLAINTS
patient BIBEMS with urinary incontinence,  by EMS, generalized weakness, chest discomfort-- denies fevers

## 2022-06-04 NOTE — ED PROVIDER NOTE - PHYSICAL EXAMINATION
CONST: tired appearing NAD speaking in full sentences  HEAD: atraumatic  EYES: conjunctivae clear, PERRL, EOMI  ENT: dry mucous membranes  NECK: supple/FROM, nttp, no jvd  CARD: rrr no murmurs  CHEST: ctab no r/r/w  ABD: soft, nd, nttp, no rebound/guarding  EXT: FROM, symmetric distal pulses intact  SKIN: warm, dry, no rash, no pedal edema/ttp/rash, cap refill <2sec  NEURO: a+ox3, 5/5 strength x4, gross sensation intact x4

## 2022-06-05 VITALS
HEART RATE: 72 BPM | OXYGEN SATURATION: 99 % | RESPIRATION RATE: 18 BRPM | TEMPERATURE: 98 F | DIASTOLIC BLOOD PRESSURE: 88 MMHG | SYSTOLIC BLOOD PRESSURE: 152 MMHG

## 2022-06-05 NOTE — ED ADULT NURSE REASSESSMENT NOTE - NS ED NURSE REASSESS COMMENT FT1
patient resting in bed, appears in no distress, safety precautions maintained. Pt awaiting ambulance to assist him get home, eta 800am

## 2022-09-12 NOTE — ED PROVIDER NOTE - NS_EDPROVIDERDISPOUSERTYPE_ED_A_ED
call from Jarvis at Dr Pollard's (nephrology) office.  She is asking for the last 3 office notes and last 3 lab results to be faxed to their office.  P) [phone]  F) [phone]   Attending Attestation (For Attendings USE Only)...

## 2022-12-09 RX ORDER — ATORVASTATIN CALCIUM 80 MG/1
1 TABLET, FILM COATED ORAL
Qty: 0 | Refills: 0 | DISCHARGE

## 2022-12-09 RX ORDER — SITAGLIPTIN AND METFORMIN HYDROCHLORIDE 500; 50 MG/1; MG/1
1 TABLET, FILM COATED ORAL
Qty: 0 | Refills: 0 | DISCHARGE

## 2022-12-09 RX ORDER — LISINOPRIL 2.5 MG/1
1 TABLET ORAL
Qty: 0 | Refills: 0 | DISCHARGE

## 2022-12-09 RX ORDER — INSULIN HUMAN 100 [IU]/ML
25 INJECTION, SOLUTION SUBCUTANEOUS
Qty: 0 | Refills: 0 | DISCHARGE

## 2022-12-09 RX ORDER — FINASTERIDE 5 MG/1
1 TABLET, FILM COATED ORAL
Qty: 0 | Refills: 0 | DISCHARGE

## 2022-12-09 RX ORDER — EMPAGLIFLOZIN 10 MG/1
1 TABLET, FILM COATED ORAL
Qty: 0 | Refills: 0 | DISCHARGE

## 2023-03-13 NOTE — ED PROVIDER NOTE - SECONDARY DIAGNOSIS.
He is approved for 60 mcg. I would have to do a pa if we increase. I will fax orders for iron and ferritin as we have not ordered. Dyspnea Hyperglycemia

## 2023-05-26 NOTE — DISCHARGE NOTE ADULT - MEDICATION SUMMARY - MEDICATIONS TO STOP TAKING
I will STOP taking the medications listed below when I get home from the hospital:  None Time-based billing (NON-critical care)

## 2023-05-28 NOTE — ED ADULT NURSE REASSESSMENT NOTE - NS ED NURSE REASSESS COMMENT FT1
Lab results reviewed, will continue to monitor.
Bed/Stretcher in lowest position, wheels locked, appropriate side rails in place/Call bell, personal items and telephone in reach/Instruct patient to call for assistance before getting out of bed/chair/stretcher/Non-slip footwear applied when patient is off stretcher/[place] to call system/Physically safe environment - no spills, clutter or unnecessary equipment/Purposeful proactive rounding/Room/bathroom lighting operational, light cord in reach

## 2023-09-28 NOTE — ED PROVIDER NOTE - CRANIAL NERVE AND PUPILLARY EXAM
peripheral vision intact/cranial nerves 2-12 intact/central and peripheral vision intact/gag reflex intact/tongue is midline/central vision intact/extra-ocular movements intact
175.9

## 2023-10-09 NOTE — PHYSICAL THERAPY INITIAL EVALUATION ADULT - PERSONAL SAFETY AND JUDGMENT, REHAB EVAL
Detail Level: Zone technique with RW during transfers and ambulation/impaired/at risk behaviors demonstrated Detail Level: Simple technique with RW during transfers and ambulation, urinating in bed/impaired/at risk behaviors demonstrated

## 2023-12-09 NOTE — DISCHARGE NOTE ADULT - ADDITIONAL INSTRUCTIONS
accepted Please follow-up with cardiologist Dr. Marie in 1-2 weeks.    Please call to schedule an appointment with the Cardiac Electrophysiology team in 1-2 weeks.   Please follow-up with your primary care Dr. Alvarez within 1 week for diabetes management. Please follow-up with cardiologist Dr. Marie in 1-2 weeks.      Please follow-up with your endocrinologist at your appointment on November 28, 2017 @ 2PM.     Please call to schedule an appointment with the Cardiac Electrophysiology team in 1-2 weeks.     Please follow-up with your primary care Dr. Ramos as scheduled.

## 2024-07-12 NOTE — ED ADULT TRIAGE NOTE - INTERPRETER NAME
INITIAL PSYCHIATRIC HISTORY & PHYSICAL    Patient Identification:  Name:  Raudel Harden  Age:  64 y.o.  Sex:  male  :  1960  MRN:  [id number]   Visit Number:  [id number]  Primary Care Physician:  Lisa Hurtado,     SUBJECTIVE    CC/Focus of Exam: alcohol use    HPI: Raudel Harden is a 64 y.o. male who was admitted on 2024 with complaints of alcohol use and withdrawals. The patient reports a long history of substance use. First use was at age 18 years. Over time the use increased and the patient  continued to use despite negative consequences including relationship problems, social and financial problems. The patient endorses symptoms of tolerance and withdrawals and ongoing cravings to use. Has tried to cut down and stop but has not been successful. Spends too much time and resources in pursuit of substance use. Longest period of sobriety is reported to be 1 week.  Currently using a fifth of bourbon daily and occasional thc use.   Last use was two days ago.   Withdrawal symptoms weakness, tremors, confusion.     PAST PSYCHIATRIC HX: None reported    SUBSTANCE USE HX: See HPI.     SOCIAL HX:   Social History     Socioeconomic History    Marital status:    Tobacco Use    Smoking status: Every Day     Current packs/day: 1.00     Average packs/day: 1 pack/day for 29.5 years (29.5 ttl pk-yrs)     Types: Cigarettes     Start date:     Smokeless tobacco: Current   Vaping Use    Vaping status: Never Used    Passive vaping exposure: Yes   Substance and Sexual Activity    Drug use: Never    Sexual activity: Not Currently         Past Medical History:   Diagnosis Date    Alcohol abuse     Cardiovascular arteriosclerosis     GI bleed         History reviewed. No pertinent surgical history.    History reviewed. No pertinent family history.      No medications prior to admission.         ALLERGIES:  Bupropion    Temp:  [97.7 °F (36.5 °C)-98.8 °F (37.1 °C)] 98.8 °F (37.1  °C)  Heart Rate:  [] 86  Resp:  [16-28] 28  BP: ()/(73-99) 137/93    REVIEW OF SYSTEMS:  Review of Systems   Constitutional:  Positive for diaphoresis and fatigue.   HENT: Negative.     Eyes: Negative.    Respiratory: Negative.     Cardiovascular: Negative.    Gastrointestinal:  Positive for nausea.   Endocrine: Negative.    Genitourinary: Negative.    Musculoskeletal:  Positive for myalgias.   Skin:  Positive for wound.   Allergic/Immunologic: Negative.    Neurological:  Positive for tremors and weakness.   Hematological: Negative.    Psychiatric/Behavioral:  Positive for dysphoric mood. The patient is nervous/anxious.         OBJECTIVE    PHYSICAL EXAM:  Physical Exam  Constitutional:  Appears well-developed and well-nourished.   HENT:   Head: Normocephalic and atraumatic.   Right Ear: External ear normal.   Left Ear: External ear normal.   Mouth/Throat: Oropharynx is clear and moist.   Eyes: Pupils are equal, round, and reactive to light. Conjunctivae and EOM are normal.   Neck: Normal range of motion. Neck supple.   Cardiovascular: Normal rate, regular rhythm and normal heart sounds.    Respiratory: Effort normal and breath sounds normal. No respiratory distress. No wheezes.   GI: Soft. Bowel sounds are normal.No distension. There is no tenderness.   Musculoskeletal: Normal range of motion. No edema or deformity.   Neurological:No cranial nerve deficit. Coordination normal.   Skin: Skin is warm and dry. Multiple skin wounds noted.     MENTAL STATUS EXAM:   Hygiene:   fair  Cooperation:  Cooperative  Eye Contact:  Fair  Psychomotor Behavior:  Slow  Affect:  Appropriate  Hopelessness: Denies  Speech:  Normal  Thought Progress: Goal directed  Thought Content:  Normal  Suicidal:  None  Homicidal:  None  Hallucinations:  None  Delusion:  None  Memory:  Intact  Orientation:  Person, Place, Time, and Situation  Reliability:  fair  Insight:  Fair  Judgement:  Fair  Impulse Control:  Fair    Imaging Results  (Last 24 Hours)       ** No results found for the last 24 hours. **             ECG/EMG Results (most recent)       Procedure Component Value Units Date/Time    ECG 12 Lead Other; Baseline Cardiac Status [[id number]] Collected: 07/12/24 0206     Updated: 07/12/24 1020     QT Interval 378 ms      QTC Interval 504 ms     Narrative:      Test Reason : Other~  Blood Pressure :   */*   mmHG  Vent. Rate : 107 BPM     Atrial Rate : 107 BPM     P-R Int : 130 ms          QRS Dur :  78 ms      QT Int : 378 ms       P-R-T Axes :  36   3  36 degrees     QTc Int : 504 ms    Sinus tachycardia with premature atrial complexes with aberrant conduction  Cannot rule out Anterior infarct , age undetermined  Abnormal ECG  No previous ECGs available  Confirmed by Natty Polanco ([id number]) on 7/12/2024 10:19:03 AM    Referred By:            Confirmed By: Natty Polanco             Lab Results   Component Value Date    GLUCOSE 105 (H) 07/11/2024    BUN 13 07/11/2024    CREATININE 0.75 (L) 07/11/2024    EGFRIFAFRI 101 04/16/2024    BCR 17.3 07/11/2024    CO2 26.9 07/11/2024    CALCIUM 8.7 07/11/2024    ALBUMIN 3.5 07/11/2024    AST 75 (H) 07/11/2024    ALT 25 07/11/2024       Lab Results   Component Value Date    WBC 9.64 07/11/2024    HGB 10.1 (L) 07/11/2024    HCT 32.3 (L) 07/11/2024    MCV 95.8 07/11/2024     07/11/2024       Last Urine Toxicity          Latest Ref Rng & Units 7/11/2024   LAST URINE TOXICITY RESULTS   Amphetamine, Urine Qual Negative Negative    Barbiturates Screen, Urine Negative Negative    Benzodiazepine Screen, Urine Negative Negative    Buprenorphine, Screen, Urine Negative Negative    Cocaine Screen, Urine Negative Negative    Fentanyl, Urine Negative Negative    Methadone Screen , Urine Negative Negative    Methamphetamine, Ur Negative Negative        Brief Urine Lab Results  (Last result in the past 365 days)        Color   Clarity   Blood   Leuk Est   Nitrite   Protein   CREAT   Urine HCG        07/11/24 1153  Yellow   Clear   Negative   Negative   Negative   Trace                   DATA  Labs reviewed.  Creatinine 0.75, EGFR 105, AST 75.  Hemoglobin 10.1, hematocrit 32.3, MCHC 31.3.  Screen is negative.  Urinalysis shows glucose 500 mg/dL and trace protein.  Blood alcohol level was 100 mg/dL at the time of presentation to the ED.  Urine drug screen is positive for THC.  EKG reviewed.  QTc interval of 504 ms.  ANGEL reviewed.   Record reviewed. No previous treatment noted in this hospital for mental health or substance use problems.       Strengths: Motivated for treatment    Weaknesses:Substance use and Poor coping skills    Code status:  Full  Discussed code status with patient.    ASSESSMENT & PLAN:    Hospital bed: Yes patient is fall risk.      Alcohol use disorder, severe, dependence    Alcohol withdrawal  -Ativan detox  -Thiamine and folate      Skin wounds  -Patient has multiple lesions on scalp, neck, left antecubital, left upper sternal, upper thoracic spine  -Wound care consult and appreciate recommendations       Nicotine use disorder  -Encourage cessation         COPD  -Oxygen per nasal canula as needed.      The patient has been admitted for safety and stabilization.  Patient will be monitored for suicidality daily and maintained on Special Precautions Level 4 (q30 min checks).  The patient will have individual and group therapy with a master's level therapist. A master treatment plan will be developed and agreed upon by the patient and his/her treatment team.  The patient's estimated length of stay in the hospital is 5-7 days.            jeff

## 2024-12-05 NOTE — ED PROVIDER NOTE - CPE EDP NEURO NORM
normal... Quality 394b: Td/Tdap Immunizations For Adolescents: Patient had one tetanus, diphtheria toxoids and acellular pertussis vaccine (Tdap) on or between the patient's 10th and 13th birthdays. Additional Notes: Received flu vaccine Detail Level: Detailed Quality 226: Preventive Care And Screening: Tobacco Use: Screening And Cessation Intervention: Patient screened for tobacco use and is an ex/non-smoker

## 2025-06-16 NOTE — H&P ADULT - NSHPRISKHEPCSCREEN_GEN_A_CORE
CXR  ( I personally review and interpreted the images) with bilateral perihilar/upper lobe interstitial/airspace opacities, infectious vs chronic interstitial lung disease  No respiratory symptoms  Check CT chest  Check procalcitonin Offered and patient declined

## 2025-07-08 NOTE — STROKE CODE NOTE - [facility] STROKE SCALE: 2. BEST GAZE, QM
Order for CT refaxed to [phone] showing that order is electronically signed by Dr WEBER.  Also included a note that he is [place] the [place] and is unable to resign   (0) Normal